# Patient Record
Sex: FEMALE | Race: WHITE | NOT HISPANIC OR LATINO | Employment: OTHER | URBAN - METROPOLITAN AREA
[De-identification: names, ages, dates, MRNs, and addresses within clinical notes are randomized per-mention and may not be internally consistent; named-entity substitution may affect disease eponyms.]

---

## 2018-04-09 LAB
BACTERIA UR QL AUTO: ABNORMAL /HPF
BILIRUB UR QL STRIP: NEGATIVE
CLARITY UR: ABNORMAL
COLOR UR: YELLOW
GLUCOSE UR STRIP-MCNC: NEGATIVE MG/DL
HGB UR QL STRIP.AUTO: ABNORMAL
KETONES UR STRIP-MCNC: NEGATIVE MG/DL
LEUKOCYTE ESTERASE UR QL STRIP: ABNORMAL
NITRITE UR QL STRIP: POSITIVE
NON-SQ EPI CELLS URNS QL MICRO: ABNORMAL /LPF
PH UR STRIP.AUTO: 6 [PH] (ref 4.5–8)
PROT UR STRIP-MCNC: NEGATIVE MG/DL
RBC #/AREA URNS AUTO: ABNORMAL /HPF
SP GR UR STRIP.AUTO: 1.02 (ref 1–1.03)
UROBILINOGEN UR QL STRIP.AUTO: 0.2 EU/DL (ref 0.2–8)
WBC #/AREA URNS AUTO: > 100 /HPF

## 2018-07-30 PROBLEM — I73.9 PVD (PERIPHERAL VASCULAR DISEASE) (HCC): Status: ACTIVE | Noted: 2018-07-30

## 2018-07-30 PROBLEM — E78.5 HYPERLIPIDEMIA: Status: ACTIVE | Noted: 2018-07-30

## 2018-07-30 RX ORDER — FUROSEMIDE 40 MG/1
40 TABLET ORAL DAILY
COMMUNITY
End: 2019-07-07 | Stop reason: SDUPTHER

## 2018-07-30 RX ORDER — SIMVASTATIN 20 MG
20 TABLET ORAL
Status: ON HOLD | COMMUNITY

## 2018-07-30 RX ORDER — LATANOPROST 50 UG/ML
1 SOLUTION/ DROPS OPHTHALMIC
Status: ON HOLD | COMMUNITY

## 2018-10-04 ENCOUNTER — TELEPHONE (OUTPATIENT)
Dept: FAMILY MEDICINE CLINIC | Facility: CLINIC | Age: 83
End: 2018-10-04

## 2018-10-04 DIAGNOSIS — Z13.83 ENCOUNTER FOR SCREENING FOR PNEUMONIA: Primary | ICD-10-CM

## 2018-10-09 DIAGNOSIS — J18.9 PNEUMONIA OF RIGHT LOWER LOBE DUE TO INFECTIOUS ORGANISM: Primary | ICD-10-CM

## 2018-10-09 RX ORDER — AZITHROMYCIN 500 MG/1
500 TABLET, FILM COATED ORAL EVERY 24 HOURS
Qty: 7 TABLET | Refills: 0 | Status: SHIPPED | OUTPATIENT
Start: 2018-10-09 | End: 2018-10-16

## 2018-10-23 ENCOUNTER — OFFICE VISIT (OUTPATIENT)
Dept: FAMILY MEDICINE CLINIC | Facility: CLINIC | Age: 83
End: 2018-10-23
Payer: MEDICARE

## 2018-10-23 VITALS
TEMPERATURE: 97.4 F | RESPIRATION RATE: 18 BRPM | HEART RATE: 65 BPM | OXYGEN SATURATION: 97 % | DIASTOLIC BLOOD PRESSURE: 72 MMHG | SYSTOLIC BLOOD PRESSURE: 124 MMHG

## 2018-10-23 DIAGNOSIS — E78.5 HYPERLIPIDEMIA, UNSPECIFIED HYPERLIPIDEMIA TYPE: Primary | ICD-10-CM

## 2018-10-23 DIAGNOSIS — J18.9 PNEUMONIA OF RIGHT LOWER LOBE DUE TO INFECTIOUS ORGANISM: ICD-10-CM

## 2018-10-23 PROCEDURE — 99214 OFFICE O/P EST MOD 30 MIN: CPT | Performed by: INTERNAL MEDICINE

## 2018-10-23 RX ORDER — SODIUM CHLORIDE 5 %
1 OINTMENT (GRAM) OPHTHALMIC (EYE) DAILY
Status: ON HOLD | COMMUNITY

## 2018-10-23 RX ORDER — DIFLUPREDNATE 0.5 MG/ML
EMULSION OPHTHALMIC
Status: ON HOLD | COMMUNITY

## 2018-10-23 RX ORDER — DOCUSATE SODIUM 100 MG/1
100 CAPSULE, LIQUID FILLED ORAL 2 TIMES DAILY
Status: ON HOLD | COMMUNITY
End: 2019-12-28

## 2018-10-23 NOTE — PROGRESS NOTES
Assessment/Plan:         Diagnoses and all orders for this visit:    Hyperlipidemia, unspecified hyperlipidemia type  -     Lipid panel; Future    Pneumonia of right lower lobe due to infectious organism ([facility] [address] )  -     CBC and differential; Future  -     Basic metabolic panel; Future    Other orders  -     Coenzyme Q10 (CO Q 10) 100 MG CAPS; Take by mouth  -     Difluprednate (DUREZOL) 0 05 % EMUL; Apply to eye Install 1 drop into right eye every morning  -     sodium chloride (IRISH 128) 5 % hypertonic ophthalmic ointment; 1 drop daily  -     docusate sodium (COLACE) 100 mg capsule; Take 100 mg by mouth 2 (two) times a day          Subjective:      Patient ID: Margot Collins is a 80 y o  female  This is a 80-year-old female resident of a local personal care home comes for a follow-up visit  Patient recently was treated in the personal care home with a right lower lobe pneumonia presenting with cough and chest x-ray showing small right basal infiltrate  Patient was given Zithromax and improved clinically  She has no cough no fever no chills  Her examination shows no findings on the lungs at this time  Other medical issues include 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  Ambulatory dysfunction due to arthritis patient limited to wheelchair  History of hyperlipidemia on statin therapy  History of mycotic infection under the breast treated with local cream and powder and resolved  The following portions of the patient's history were reviewed and updated as appropriate: She  has a past medical history of Blind left eye; Edema; Fx of fibula; Hypercholesterolemia; Hypertension; and Hypotension  Patient Active Problem List    Diagnosis Date Noted    Hyperlipidemia 07/30/2018    PVD (peripheral vascular disease) ([facility] [address] ) 07/30/2018     She  has a past surgical history that includes Hysterectomy; Thyroid surgery; and Eye surgery  Her family history is not on file    She  reports that she has never smoked  She does not have any smokeless tobacco history on file  She reports that she does not drink alcohol or use drugs  Current Outpatient Prescriptions   Medication Sig Dispense Refill    Acetaminophen (APAP) 325 MG tablet Take 650 mg by mouth every 4 (four) hours as needed for mild pain      aspirin 81 MG tablet Take 81 mg by mouth daily      Coenzyme Q10 (CO Q 10) 100 MG CAPS Take by mouth      Difluprednate (DUREZOL) 0 05 % EMUL Apply to eye Install 1 drop into right eye every morning      docusate sodium (COLACE) 100 mg capsule Take 100 mg by mouth 2 (two) times a day      furosemide (LASIX) 40 mg tablet Take 40 mg by mouth daily      latanoprost (XALATAN) 0 005 % ophthalmic solution       Multiple Vitamin (MULTIVITAMINS PO) Take by mouth daily      Omega-3 Fatty Acids (OMEGA 3 PO) Take by mouth daily      simvastatin (ZOCOR) 20 mg tablet Take 20 mg by mouth daily      sodium chloride (IRISH 128) 5 % hypertonic ophthalmic ointment 1 drop daily       No current facility-administered medications for this visit  Current Outpatient Prescriptions on File Prior to Visit   Medication Sig    Acetaminophen (APAP) 325 MG tablet Take 650 mg by mouth every 4 (four) hours as needed for mild pain    aspirin 81 MG tablet Take 81 mg by mouth daily    furosemide (LASIX) 40 mg tablet Take 40 mg by mouth daily    latanoprost (XALATAN) 0 005 % ophthalmic solution     Multiple Vitamin (MULTIVITAMINS PO) Take by mouth daily    Omega-3 Fatty Acids (OMEGA 3 PO) Take by mouth daily    simvastatin (ZOCOR) 20 mg tablet Take 20 mg by mouth daily     No current facility-administered medications on file prior to visit  She has No Known Allergies       Review of Systems   Constitutional: Negative  HENT: Negative  Eyes: Negative  Respiratory: Negative  Cardiovascular: Negative  Gastrointestinal: Negative  Endocrine: Negative  Genitourinary: Negative      Musculoskeletal: Positive for arthralgias and gait problem  Skin: Negative  Allergic/Immunologic: Negative  Hematological: Negative  Psychiatric/Behavioral: Negative  Objective:      /72   Pulse 65   Temp (!) 97 4 °F (36 3 °C) (Tympanic)   Resp 18   SpO2 97%          Physical Exam      No visits with results within 4 Month(s) from this visit  Latest known visit with results is:   Orders Only on 04/09/2018   Component Date Value Ref Range Status    Color, UA 04/09/2018 YELLOW   Corrected    Clarity, UA 04/09/2018 CLOUDY   Corrected    Specific Gravity, UA 04/09/2018 1 020  1 003 - 1 035 Corrected    pH, UA 04/09/2018 6 0  4 5 - 8 0 Corrected    Glucose, UA 04/09/2018 NEGATIVE  NEGATIVE;N Corrected    Bilirubin, UA 04/09/2018 NEGATIVE  NEGATIVE;N Corrected    Ketones, UA 04/09/2018 NEGATIVE  N;NEG;NEGATIVE Corrected    Protein, UA 04/09/2018 NEGATIVE  NEGATIVE;N Corrected    Urobilinogen, UA 04/09/2018 0 2  0 2 - 8 0 EU/DL Corrected    Nitrite, UA 04/09/2018 POSITIVE* NEGATIVE;N;NEG Corrected    Blood, UA 04/09/2018 TRACE* NEGATIVE;N;NEG Corrected    Leukocytes, UA 04/09/2018 3+* NEGATIVE;N;NEG Corrected    WBC, UA 04/09/2018 > 100* U0-2;NONE /HPF Final    Epithelial Cells 04/09/2018 MODERATE* NONE;FEW /LPF Final    Bacteria, UA 04/09/2018 MANY* NONE SEEN /HPF Final    RBC, UA 04/09/2018 0 - 2  U0-2;NONE /HPF Final       No results found

## 2018-10-23 NOTE — PROGRESS NOTES
Assessment/Plan:  Right basal pneumonitis treated with outpatient improved with Zithromax therapy  2   Hyperlipidemia on statin therapy  Will check lipid profile for next visit 3  Arthritis with ambulatory dysfunction  4   Chronic lower extremity edema requiring Lasix p r n  5   Mycotic infection under the breast resolved  Plan  Meds reviewed  Monitor CBC Chem 7 and lipid for next visit in 4 months  Flu shot given         Diagnoses and all orders for this visit:    Hyperlipidemia, unspecified hyperlipidemia type  -     Lipid panel; Future    Pneumonia of right lower lobe due to infectious organism ([facility] )  -     CBC and differential; Future  -     Basic metabolic panel; Future    Other orders  -     Coenzyme Q10 (CO Q 10) 100 MG CAPS; Take by mouth  -     Difluprednate (DUREZOL) 0 05 % EMUL; Apply to eye Install 1 drop into right eye every morning  -     sodium chloride (IRISH 128) 5 % hypertonic ophthalmic ointment; 1 drop daily  -     docusate sodium (COLACE) 100 mg capsule; Take 100 mg by mouth 2 (two) times a day          Subjective:      Patient ID: Susie Glover is a 80 y o  female  HPI    The following portions of the patient's history were reviewed and updated as appropriate: She  has a past medical history of Blind left eye; Edema; Fx of fibula; Hypercholesterolemia; Hypertension; and Hypotension  Patient Active Problem List    Diagnosis Date Noted    Hyperlipidemia 07/30/2018    PVD (peripheral vascular disease) ([facility] ) 07/30/2018     She  has a past surgical history that includes Hysterectomy; Thyroid surgery; and Eye surgery  Her family history is not on file  She  reports that she has never smoked  She does not have any smokeless tobacco history on file  She reports that she does not drink alcohol or use drugs    Current Outpatient Prescriptions   Medication Sig Dispense Refill    Acetaminophen (APAP) 325 MG tablet Take 650 mg by mouth every 4 (four) hours as needed for mild pain      aspirin 81 MG tablet Take 81 mg by mouth daily      Coenzyme Q10 (CO Q 10) 100 MG CAPS Take by mouth      Difluprednate (DUREZOL) 0 05 % EMUL Apply to eye Install 1 drop into right eye every morning      docusate sodium (COLACE) 100 mg capsule Take 100 mg by mouth 2 (two) times a day      furosemide (LASIX) 40 mg tablet Take 40 mg by mouth daily      latanoprost (XALATAN) 0 005 % ophthalmic solution       Multiple Vitamin (MULTIVITAMINS PO) Take by mouth daily      Omega-3 Fatty Acids (OMEGA 3 PO) Take by mouth daily      simvastatin (ZOCOR) 20 mg tablet Take 20 mg by mouth daily      sodium chloride (IRISH 128) 5 % hypertonic ophthalmic ointment 1 drop daily       No current facility-administered medications for this visit  Current Outpatient Prescriptions on File Prior to Visit   Medication Sig    Acetaminophen (APAP) 325 MG tablet Take 650 mg by mouth every 4 (four) hours as needed for mild pain    aspirin 81 MG tablet Take 81 mg by mouth daily    furosemide (LASIX) 40 mg tablet Take 40 mg by mouth daily    latanoprost (XALATAN) 0 005 % ophthalmic solution     Multiple Vitamin (MULTIVITAMINS PO) Take by mouth daily    Omega-3 Fatty Acids (OMEGA 3 PO) Take by mouth daily    simvastatin (ZOCOR) 20 mg tablet Take 20 mg by mouth daily     No current facility-administered medications on file prior to visit  She has No Known Allergies       Review of Systems      Objective:      /72   Pulse 65   Temp (!) 97 4 °F (36 3 °C) (Tympanic)   Resp 18   SpO2 97%          Physical Exam   Constitutional: She is oriented to person, place, and time  She appears well-developed and well-nourished  HENT:   Head: Normocephalic and atraumatic  Eyes: Pupils are equal, round, and reactive to light  Neck: Normal range of motion  Neck supple  No JVD present  No thyromegaly present  Cardiovascular: Normal rate, regular rhythm and normal heart sounds  Exam reveals no friction rub      No murmur heard   Pulmonary/Chest: Effort normal and breath sounds normal  No respiratory distress  She has no wheezes  She has no rales  Abdominal: Soft  Bowel sounds are normal  She exhibits no mass  There is no tenderness  There is no rebound  Musculoskeletal: Normal range of motion  She exhibits no edema or tenderness  Lymphadenopathy:     She has no cervical adenopathy  Neurological: She is alert and oriented to person, place, and time  She has normal reflexes  Skin: Skin is warm and dry  Psychiatric: She has a normal mood and affect  No visits with results within 4 Month(s) from this visit  Latest known visit with results is:   Orders Only on 04/09/2018   Component Date Value Ref Range Status    Color, UA 04/09/2018 YELLOW   Corrected    Clarity, UA 04/09/2018 CLOUDY   Corrected    Specific Gravity, UA 04/09/2018 1 020  1 003 - 1 035 Corrected    pH, UA 04/09/2018 6 0  4 5 - 8 0 Corrected    Glucose, UA 04/09/2018 NEGATIVE  NEGATIVE;N Corrected    Bilirubin, UA 04/09/2018 NEGATIVE  NEGATIVE;N Corrected    Ketones, UA 04/09/2018 NEGATIVE  N;NEG;NEGATIVE Corrected    Protein, UA 04/09/2018 NEGATIVE  NEGATIVE;N Corrected    Urobilinogen, UA 04/09/2018 0 2  0 2 - 8 0 EU/DL Corrected    Nitrite, UA 04/09/2018 POSITIVE* NEGATIVE;N;NEG Corrected    Blood, UA 04/09/2018 TRACE* NEGATIVE;N;NEG Corrected    Leukocytes, UA 04/09/2018 3+* NEGATIVE;N;NEG Corrected    WBC, UA 04/09/2018 > 100* U0-2;NONE /HPF Final    Epithelial Cells 04/09/2018 MODERATE* NONE;FEW /LPF Final    Bacteria, UA 04/09/2018 MANY* NONE SEEN /HPF Final    RBC, UA 04/09/2018 0 - 2  U0-2;NONE /HPF Final       No results found

## 2019-01-03 ENCOUNTER — HOSPITAL ENCOUNTER (EMERGENCY)
Facility: HOSPITAL | Age: 84
Discharge: HOME | End: 2019-01-03
Attending: EMERGENCY MEDICINE | Admitting: EMERGENCY MEDICINE
Payer: MEDICARE

## 2019-01-03 VITALS
SYSTOLIC BLOOD PRESSURE: 103 MMHG | RESPIRATION RATE: 18 BRPM | WEIGHT: 170 LBS | BODY MASS INDEX: 29.02 KG/M2 | HEART RATE: 71 BPM | OXYGEN SATURATION: 96 % | DIASTOLIC BLOOD PRESSURE: 53 MMHG | HEIGHT: 64 IN | TEMPERATURE: 97.8 F

## 2019-01-03 DIAGNOSIS — R04.0 ANTERIOR EPISTAXIS: Primary | ICD-10-CM

## 2019-01-03 LAB
APTT PPP: 32 SECONDS (ref 26–38)
BASOPHILS # BLD AUTO: 0 THOUSANDS/ΜL (ref 0–0.1)
BASOPHILS NFR BLD AUTO: 1 % (ref 0–1)
EOSINOPHIL # BLD AUTO: 0.2 THOUSAND/ΜL (ref 0–0.61)
EOSINOPHIL NFR BLD AUTO: 3 % (ref 0–6)
ERYTHROCYTE [DISTWIDTH] IN BLOOD BY AUTOMATED COUNT: 13.3 % (ref 11.6–15.1)
HCT VFR BLD AUTO: 45.4 % (ref 37–47)
HGB BLD-MCNC: 15.1 G/DL (ref 11.5–15.4)
INR PPP: 1.09 (ref 0.9–1.5)
LYMPHOCYTES # BLD AUTO: 1.4 THOUSANDS/ΜL (ref 0.6–4.47)
LYMPHOCYTES NFR BLD AUTO: 21 % (ref 14–44)
MCH RBC QN AUTO: 30.7 PG (ref 26.8–34.3)
MCHC RBC AUTO-ENTMCNC: 33.3 G/DL (ref 31.4–37.4)
MCV RBC AUTO: 92 FL (ref 82–98)
MONOCYTES # BLD AUTO: 0.6 THOUSAND/ΜL (ref 0.17–1.22)
MONOCYTES NFR BLD AUTO: 9 % (ref 4–12)
NEUTROPHILS # BLD AUTO: 4.7 THOUSANDS/ΜL (ref 1.85–7.62)
NEUTS SEG NFR BLD AUTO: 68 % (ref 43–75)
NRBC BLD AUTO-RTO: 0 /100 WBCS
PLATELET # BLD AUTO: 216 THOUSANDS/UL (ref 149–390)
PMV BLD AUTO: 8.5 FL (ref 8.9–12.7)
PROTHROMBIN TIME: 12.5 SECONDS (ref 10.2–13)
RBC # BLD AUTO: 4.92 MILLION/UL (ref 3.81–5.12)
WBC # BLD AUTO: 6.9 THOUSAND/UL (ref 4.31–10.16)

## 2019-01-03 PROCEDURE — 36415 COLL VENOUS BLD VENIPUNCTURE: CPT | Performed by: EMERGENCY MEDICINE

## 2019-01-03 PROCEDURE — 85610 PROTHROMBIN TIME: CPT | Performed by: EMERGENCY MEDICINE

## 2019-01-03 PROCEDURE — 99284 EMERGENCY DEPT VISIT MOD MDM: CPT

## 2019-01-03 PROCEDURE — 85730 THROMBOPLASTIN TIME PARTIAL: CPT | Performed by: EMERGENCY MEDICINE

## 2019-01-03 PROCEDURE — 85025 COMPLETE CBC W/AUTO DIFF WBC: CPT | Performed by: EMERGENCY MEDICINE

## 2019-01-03 RX ADMIN — SILVER NITRATE APPLICATORS 1 APPLICATOR: 25; 75 STICK TOPICAL at 08:00

## 2019-01-03 NOTE — DISCHARGE INSTRUCTIONS
Nosebleed   WHAT YOU NEED TO KNOW:   A nosebleed, or epistaxis, occurs when one or more of the blood vessels in your nose break  You may have dark or bright red blood from one or both nostrils  A nosebleed is most commonly caused by dry air or picking your nose  A direct blow to your nose, irritation from a cold or allergies, or a foreign object can also cause a nosebleed  DISCHARGE INSTRUCTIONS:   Return to the emergency department if:   · Your nasal packing is soaked with blood  · Your nose is still bleeding after 20 minutes, even after you pinch it  · You have a foul-smelling discharge coming out of your nose  · You feel so weak and dizzy that you have trouble standing up  · You have trouble breathing or talking  Contact your healthcare provider if:   · You have a fever and are vomiting  · You have pain in and around your nose that is getting worse even after you take pain medicines  · Your nasal pack is loose  · You have questions or concerns about your condition or care  First aid:   · Sit up and lean forward  This will help prevent you from swallowing blood  Spit blood and saliva into a bowl  · Apply pressure to your nose  Use 2 fingers to pinch your nose shut for 10 to 15 minutes  This will help stop the bleeding  Breathe through your mouth  · Apply ice  on the bridge of your nose to decrease swelling and bleeding  Use a cold pack or put crushed ice in a plastic bag  Cover it with a towel to protect your skin  · Pack your nose  with a cotton ball, tissue, tampon, or gauze bandage to stop the bleeding  Medicines:   · Medicines  applied to a small piece of cotton and placed in your nose  Medicine may also be sprayed in or applied directly to your nose  You may need medicine to prevent an infection  If bleeding is severe, medicine may be injected into a blood vessel in your nose  · Take your medicine as directed    Contact your healthcare provider if you think your medicine is not helping or if you have side effects  Tell him of her if you are allergic to any medicine  Keep a list of the medicines, vitamins, and herbs you take  Include the amounts, and when and why you take them  Bring the list or the pill bottles to follow-up visits  Carry your medicine list with you in case of an emergency  Prevent another nosebleed:   · Keep your nose moist   Put a small amount of petroleum jelly inside your nostrils as needed  Use a saline (saltwater) nasal spray  Do not put anything else inside your nose unless your healthcare provider says it is okay  Do not  use oil-based lubricants if you use oxygen therapy  They may be flammable  · Use a cool mist humidifier to increase air moisture in your home  This will help your nose stay moist      · Do not pick or blow your nose for at least a week  You can irritate or damage your nose if you pick it  Blowing your nose too hard may cause the bleeding to start again  Do not bend over or strain as this can cause the bleeding to start again  · Avoid irritants  such as tobacco smoke or chemical sprays such as   Follow up with your healthcare provider as directed: Any packing in your nose should be removed within 2 to 3 days  Write down your questions so you remember to ask them during your visits  © 2017 [employer] Information is for End User's use only and may not be sold, redistributed or otherwise used for commercial purposes  All illustrations and images included in CareNotes® are the copyrighted property of [employer]  or [employer]  The above information is an  only  It is not intended as medical advice for individual conditions or treatments  Talk to your doctor, nurse or pharmacist before following any medical regimen to see if it is safe and effective for you

## 2019-01-03 NOTE — ED PROVIDER NOTES
History  Chief Complaint   Patient presents with    Nose Bleed     patient woke up a nose bleed from Left nare  Per nursing facilty when sitting patient up a clot came out and heavy bleeding began  70-year-old female sent from nursing home after epistaxis her left nares this morning  Patient denies chest pain, shortness of breath, cough or congestion  No nausea vomiting  No fever chills  History provided by:  Patient  Nose Bleed   Location:  L nare  Progression:  Resolved  Context: aspirin use    Relieved by:  None tried  Worsened by:  Nothing  Associated symptoms: dizziness (Resolved)    Associated symptoms: no blood in oropharynx, no congestion, no cough, no fever and no headaches        Prior to Admission Medications   Prescriptions Last Dose Informant Patient Reported? Taking?    Acetaminophen (APAP) 325 MG tablet   Yes Yes   Sig: Take 650 mg by mouth every 4 (four) hours as needed for mild pain   Coenzyme Q10 (CO Q 10) 100 MG CAPS   Yes Yes   Sig: Take by mouth   Difluprednate (DUREZOL) 0 05 % EMUL   Yes Yes   Sig: Apply to eye Install 1 drop into right eye every morning   Multiple Vitamin (MULTIVITAMINS PO)   Yes Yes   Sig: Take by mouth daily   Omega-3 Fatty Acids (OMEGA 3 PO)   Yes Yes   Sig: Take by mouth daily   aspirin 81 MG tablet   Yes Yes   Sig: Take 81 mg by mouth daily   docusate sodium (COLACE) 100 mg capsule   Yes Yes   Sig: Take 100 mg by mouth 2 (two) times a day   furosemide (LASIX) 40 mg tablet   Yes Yes   Sig: Take 40 mg by mouth daily   latanoprost (XALATAN) 0 005 % ophthalmic solution   Yes Yes   simvastatin (ZOCOR) 20 mg tablet   Yes Yes   Sig: Take 20 mg by mouth daily   sodium chloride (IRISH 128) 5 % hypertonic ophthalmic ointment   Yes Yes   Si drop daily      Facility-Administered Medications: None       Past Medical History:   Diagnosis Date    Blind left eye     Edema     legs    Fx of fibula     Hypercholesterolemia     Hypertension     Hypotension Past Surgical History:   Procedure Laterality Date    EYE SURGERY      HYSTERECTOMY      THYROID SURGERY         History reviewed  No pertinent family history  I have reviewed and agree with the history as documented  Social History   Substance Use Topics    Smoking status: Never Smoker    Smokeless tobacco: Never Used    Alcohol use No        Review of Systems   Constitutional: Negative for chills and fever  HENT: Positive for nosebleeds  Negative for congestion and rhinorrhea  Respiratory: Negative for cough and shortness of breath  Cardiovascular: Negative for chest pain  Gastrointestinal: Negative for abdominal pain, nausea and vomiting  Neurological: Positive for dizziness (Resolved)  Negative for headaches  Psychiatric/Behavioral: Negative for confusion  Physical Exam  Physical Exam   Constitutional: She is oriented to person, place, and time  She appears well-developed and well-nourished  HENT:   Mouth/Throat: Oropharynx is clear and moist  No oropharyngeal exudate  Minimal dried blood left nares, positive engorged vessels left Kiesselbach's plexus, no active bleeding; no blood in oropharynx   Eyes: Conjunctivae are normal    Right pupil reactive to light; left cornea is opacified   Neck: Normal range of motion  No JVD present  Cardiovascular: Normal rate and regular rhythm  Murmur (2/6 systolic murmur) heard  Pulmonary/Chest: Effort normal and breath sounds normal  No respiratory distress  She has no wheezes  She has no rales  Abdominal: Soft  Bowel sounds are normal  There is no tenderness  There is no guarding  Musculoskeletal: Normal range of motion  She exhibits edema (Trace lower extremity edema bilaterally)  She exhibits no tenderness  Neurological: She is alert and oriented to person, place, and time  No cranial nerve deficit or sensory deficit  She exhibits normal muscle tone  5/5 motor, nl sens   Skin: Skin is warm and dry     Psychiatric: She has a normal mood and affect  Her behavior is normal    Nursing note and vitals reviewed        Vital Signs  ED Triage Vitals [01/03/19 0754]   Temperature Pulse Respirations Blood Pressure SpO2   (!) 96 8 °F (36 °C) 80 17 101/71 94 %      Temp Source Heart Rate Source Patient Position - Orthostatic VS BP Location FiO2 (%)   Tympanic Monitor Sitting Left arm --      Pain Score       No Pain           Vitals:    01/03/19 0754   BP: 101/71   Pulse: 80   Patient Position - Orthostatic VS: Sitting       Visual Acuity      ED Medications  Medications   silver nitrate-potassium nitrate (ARZOL SILVER NITRATE) 81-15 % applicator 1 applicator (1 applicator Topical Given 1/3/19 0800)       Diagnostic Studies  Results Reviewed     Procedure Component Value Units Date/Time    APTT [[id number]]  (Normal) Collected:  01/03/19 0800    Lab Status:  Final result Specimen:  Blood from Arm, Left Updated:  01/03/19 0828     PTT 32 seconds     Protime-INR [[id number]]  (Normal) Collected:  01/03/19 0800    Lab Status:  Final result Specimen:  Blood from Arm, Left Updated:  01/03/19 0828     Protime 12 5 seconds      INR 1 09    CBC and differential [[id number]]  (Abnormal) Collected:  01/03/19 0800    Lab Status:  Final result Specimen:  Blood from Arm, Left Updated:  01/03/19 0808     WBC 6 90 Thousand/uL      RBC 4 92 Million/uL      Hemoglobin 15 1 g/dL      Hematocrit 45 4 %      MCV 92 fL      MCH 30 7 pg      MCHC 33 3 g/dL      RDW 13 3 %      MPV 8 5 (L) fL      Platelets 036 Thousands/uL      nRBC 0 /100 WBCs      Neutrophils Relative 68 %      Lymphocytes Relative 21 %      Monocytes Relative 9 %      Eosinophils Relative 3 %      Basophils Relative 1 %      Neutrophils Absolute 4 70 Thousands/µL      Lymphocytes Absolute 1 40 Thousands/µL      Monocytes Absolute 0 60 Thousand/µL      Eosinophils Absolute 0 20 Thousand/µL      Basophils Absolute 0 00 Thousands/µL                  No orders to display Procedures  Procedures       Phone Contacts  ED Phone Contact    ED Course  ED Course as of Jan 03 0836   Thu Jan 03, 2019   9911 No rebleed - d/c                                MDM  CritCare Time    Disposition  Final diagnoses:   Anterior epistaxis     Time reflects when diagnosis was documented in both MDM as applicable and the Disposition within this note     Time User Action Codes Description Comment    1/3/2019  8:35 AM Marcelino KENNY Add [R04 0] Anterior epistaxis       ED Disposition     ED Disposition Condition Comment    Discharge  Zakia Bellamy discharge to home/self care  Condition at discharge: Stable        Follow-up Information     Follow up With Specialties Details Why [address], [place] Internal Medicine Schedule an appointment as soon as possible for a visit in 1 day Apply Vaseline to left nares daily for the next 3 days  Return if you rebleed or any new problems occur  [address] [facility]            Patient's Medications   Discharge Prescriptions    No medications on file     No discharge procedures on file      ED Provider  Electronically Signed by           Jessica Agosto MD  01/03/19 9585

## 2019-01-03 NOTE — ED NOTES
Transport back to the [facility] scheduled for [address]  01/03/19 9609

## 2019-01-03 NOTE — ED PROCEDURE NOTE
PROCEDURE  Epistaxis Mgmt  Date/Time: 1/3/2019 8:02 AM  Performed by: Ana Turk  Authorized by: Ana Turk     Patient location:  ED  Other Assisting Provider: No    Consent:     Consent obtained:  Verbal    Consent given by:  Patient    Risks discussed:  Bleeding and pain    Alternatives discussed:  No treatment  Universal protocol:     Procedure explained and questions answered to patient or proxy's satisfaction: yes      Patient identity confirmed:  Verbally with patient  Procedure details:     Treatment site:  L anterior    Hemostasis method:  Cautery (With silver nitrate stick with good results)    Approach:  External    Spec Headlamp used: No      Treatment complexity:  Limited    Treatment episode: initial    Post-procedure details:     Assessment:  Bleeding stopped    Patient tolerance of procedure:   Tolerated well, no immediate complications         Vik Hays MD  01/03/19 6352

## 2019-01-18 ENCOUNTER — APPOINTMENT (EMERGENCY)
Dept: RADIOLOGY | Facility: HOSPITAL | Age: 84
DRG: 690 | End: 2019-01-18
Payer: MEDICARE

## 2019-01-18 ENCOUNTER — HOSPITAL ENCOUNTER (INPATIENT)
Facility: HOSPITAL | Age: 84
LOS: 2 days | Discharge: HOME | DRG: 690 | End: 2019-01-21
Attending: EMERGENCY MEDICINE | Admitting: HOSPITALIST
Payer: MEDICARE

## 2019-01-18 DIAGNOSIS — N39.0 UTI (URINARY TRACT INFECTION): Primary | ICD-10-CM

## 2019-01-18 LAB
BASOPHILS # BLD AUTO: 0.1 THOUSANDS/ΜL (ref 0–0.1)
BASOPHILS NFR BLD AUTO: 1 % (ref 0–2)
EOSINOPHIL # BLD AUTO: 0 THOUSAND/ΜL (ref 0–0.61)
EOSINOPHIL NFR BLD AUTO: 0 % (ref 0–5)
ERYTHROCYTE [DISTWIDTH] IN BLOOD BY AUTOMATED COUNT: 13.4 % (ref 11.5–14.5)
GLUCOSE SERPL-MCNC: 91 MG/DL (ref 65–140)
HCT VFR BLD AUTO: 46.4 % (ref 34.8–46.1)
HGB BLD-MCNC: 15.1 G/DL (ref 12–16)
LYMPHOCYTES # BLD AUTO: 0.8 THOUSANDS/ΜL (ref 0.6–4.47)
LYMPHOCYTES NFR BLD AUTO: 8 % (ref 21–51)
MCH RBC QN AUTO: 30 PG (ref 26–34)
MCHC RBC AUTO-ENTMCNC: 32.5 G/DL (ref 31–37)
MCV RBC AUTO: 93 FL (ref 81–99)
MONOCYTES # BLD AUTO: 1.1 THOUSAND/ΜL (ref 0.17–1.22)
MONOCYTES NFR BLD AUTO: 10 % (ref 2–12)
NEUTROPHILS # BLD AUTO: 9.2 THOUSANDS/ΜL (ref 1.4–6.5)
NEUTS SEG NFR BLD AUTO: 82 % (ref 42–75)
NRBC BLD AUTO-RTO: 0 /100 WBCS
PLATELET # BLD AUTO: 214 THOUSANDS/UL (ref 149–390)
PMV BLD AUTO: 8.8 FL (ref 8.6–11.7)
RBC # BLD AUTO: 5.02 MILLION/UL (ref 3.9–5.2)
WBC # BLD AUTO: 11.1 THOUSAND/UL (ref 4.8–10.8)

## 2019-01-18 PROCEDURE — 99285 EMERGENCY DEPT VISIT HI MDM: CPT

## 2019-01-18 PROCEDURE — 84484 ASSAY OF TROPONIN QUANT: CPT | Performed by: EMERGENCY MEDICINE

## 2019-01-18 PROCEDURE — 85730 THROMBOPLASTIN TIME PARTIAL: CPT | Performed by: EMERGENCY MEDICINE

## 2019-01-18 PROCEDURE — 85610 PROTHROMBIN TIME: CPT | Performed by: EMERGENCY MEDICINE

## 2019-01-18 PROCEDURE — 93005 ELECTROCARDIOGRAM TRACING: CPT

## 2019-01-18 PROCEDURE — 85025 COMPLETE CBC W/AUTO DIFF WBC: CPT | Performed by: EMERGENCY MEDICINE

## 2019-01-18 PROCEDURE — 71045 X-RAY EXAM CHEST 1 VIEW: CPT

## 2019-01-18 PROCEDURE — 82550 ASSAY OF CK (CPK): CPT | Performed by: EMERGENCY MEDICINE

## 2019-01-18 PROCEDURE — 82948 REAGENT STRIP/BLOOD GLUCOSE: CPT

## 2019-01-18 PROCEDURE — 83880 ASSAY OF NATRIURETIC PEPTIDE: CPT | Performed by: EMERGENCY MEDICINE

## 2019-01-18 PROCEDURE — 36415 COLL VENOUS BLD VENIPUNCTURE: CPT | Performed by: EMERGENCY MEDICINE

## 2019-01-18 PROCEDURE — 80053 COMPREHEN METABOLIC PANEL: CPT | Performed by: EMERGENCY MEDICINE

## 2019-01-19 PROBLEM — N30.01 ACUTE CYSTITIS WITH HEMATURIA: Status: ACTIVE | Noted: 2019-01-19

## 2019-01-19 PROBLEM — R42 LIGHTHEADEDNESS: Status: ACTIVE | Noted: 2019-01-19

## 2019-01-19 PROBLEM — R42 DIZZY: Status: ACTIVE | Noted: 2019-01-19

## 2019-01-19 LAB
ALBUMIN SERPL BCP-MCNC: 3.9 G/DL (ref 3.5–5.7)
ALP SERPL-CCNC: 98 U/L (ref 55–165)
ALT SERPL W P-5'-P-CCNC: 9 U/L (ref 7–52)
ANION GAP SERPL CALCULATED.3IONS-SCNC: 4 MMOL/L (ref 4–13)
APTT PPP: 34 SECONDS (ref 26–38)
AST SERPL W P-5'-P-CCNC: 14 U/L (ref 13–39)
ATRIAL RATE: 87 BPM
BACTERIA UR QL AUTO: ABNORMAL /HPF
BASOPHILS # BLD AUTO: 0 THOUSANDS/ΜL (ref 0–0.1)
BASOPHILS NFR BLD AUTO: 1 % (ref 0–2)
BILIRUB SERPL-MCNC: 0.5 MG/DL (ref 0.2–1)
BILIRUB UR QL STRIP: NEGATIVE
BNP SERPL-MCNC: 58 PG/ML (ref 1–100)
BUN SERPL-MCNC: 17 MG/DL (ref 7–25)
CALCIUM SERPL-MCNC: 9.5 MG/DL (ref 8.6–10.5)
CHLORIDE SERPL-SCNC: 100 MMOL/L (ref 98–107)
CK SERPL-CCNC: 18 U/L (ref 30–192)
CLARITY UR: ABNORMAL
CO2 SERPL-SCNC: 31 MMOL/L (ref 21–31)
COLOR UR: YELLOW
CREAT SERPL-MCNC: 0.85 MG/DL (ref 0.6–1.2)
EOSINOPHIL # BLD AUTO: 0 THOUSAND/ΜL (ref 0–0.61)
EOSINOPHIL NFR BLD AUTO: 0 % (ref 0–5)
ERYTHROCYTE [DISTWIDTH] IN BLOOD BY AUTOMATED COUNT: 13.3 % (ref 11.5–14.5)
GFR SERPL CREATININE-BSD FRML MDRD: 58 ML/MIN/1.73SQ M
GLUCOSE SERPL-MCNC: 116 MG/DL (ref 65–99)
GLUCOSE UR STRIP-MCNC: NEGATIVE MG/DL
HCT VFR BLD AUTO: 43.7 % (ref 34.8–46.1)
HGB BLD-MCNC: 14.4 G/DL (ref 12–16)
HGB UR QL STRIP.AUTO: ABNORMAL
INR PPP: 1.1 (ref 0.9–1.5)
KETONES UR STRIP-MCNC: NEGATIVE MG/DL
LACTATE SERPL-SCNC: 1 MMOL/L (ref 0.5–2)
LEUKOCYTE ESTERASE UR QL STRIP: ABNORMAL
LYMPHOCYTES # BLD AUTO: 1 THOUSANDS/ΜL (ref 0.6–4.47)
LYMPHOCYTES NFR BLD AUTO: 13 % (ref 21–51)
MCH RBC QN AUTO: 30.6 PG (ref 26–34)
MCHC RBC AUTO-ENTMCNC: 32.9 G/DL (ref 31–37)
MCV RBC AUTO: 93 FL (ref 81–99)
MONOCYTES # BLD AUTO: 0.9 THOUSAND/ΜL (ref 0.17–1.22)
MONOCYTES NFR BLD AUTO: 12 % (ref 2–12)
MUCOUS THREADS UR QL AUTO: ABNORMAL
NEUTROPHILS # BLD AUTO: 5.5 THOUSANDS/ΜL (ref 1.4–6.5)
NEUTS SEG NFR BLD AUTO: 74 % (ref 42–75)
NITRITE UR QL STRIP: POSITIVE
NON-SQ EPI CELLS URNS QL MICRO: ABNORMAL /HPF
NRBC BLD AUTO-RTO: 0 /100 WBCS
P AXIS: 56 DEGREES
PH UR STRIP.AUTO: 6.5 [PH] (ref 5–8)
PLATELET # BLD AUTO: 182 THOUSANDS/UL (ref 149–390)
PMV BLD AUTO: 9.1 FL (ref 8.6–11.7)
POTASSIUM SERPL-SCNC: 4.2 MMOL/L (ref 3.5–5.5)
PR INTERVAL: 134 MS
PROT SERPL-MCNC: 6.6 G/DL (ref 6.4–8.9)
PROT UR STRIP-MCNC: NEGATIVE MG/DL
PROTHROMBIN TIME: 12.8 SECONDS (ref 10.2–13)
QRS AXIS: -40 DEGREES
QRSD INTERVAL: 138 MS
QT INTERVAL: 376 MS
QTC INTERVAL: 452 MS
RBC # BLD AUTO: 4.69 MILLION/UL (ref 3.9–5.2)
RBC #/AREA URNS AUTO: ABNORMAL /HPF
SODIUM SERPL-SCNC: 135 MMOL/L (ref 134–143)
SP GR UR STRIP.AUTO: 1.02 (ref 1–1.03)
T WAVE AXIS: 8 DEGREES
TROPONIN I SERPL-MCNC: <0.03 NG/ML
TROPONIN I SERPL-MCNC: <0.03 NG/ML
UROBILINOGEN UR QL STRIP.AUTO: 0.2 E.U./DL
VENTRICULAR RATE: 87 BPM
WBC # BLD AUTO: 7.5 THOUSAND/UL (ref 4.8–10.8)
WBC #/AREA URNS AUTO: ABNORMAL /HPF

## 2019-01-19 PROCEDURE — 87077 CULTURE AEROBIC IDENTIFY: CPT | Performed by: EMERGENCY MEDICINE

## 2019-01-19 PROCEDURE — 99222 1ST HOSP IP/OBS MODERATE 55: CPT | Performed by: HOSPITALIST

## 2019-01-19 PROCEDURE — 96365 THER/PROPH/DIAG IV INF INIT: CPT

## 2019-01-19 PROCEDURE — 36415 COLL VENOUS BLD VENIPUNCTURE: CPT | Performed by: EMERGENCY MEDICINE

## 2019-01-19 PROCEDURE — 85025 COMPLETE CBC W/AUTO DIFF WBC: CPT | Performed by: PHYSICIAN ASSISTANT

## 2019-01-19 PROCEDURE — 81001 URINALYSIS AUTO W/SCOPE: CPT | Performed by: EMERGENCY MEDICINE

## 2019-01-19 PROCEDURE — G8979 MOBILITY GOAL STATUS: HCPCS

## 2019-01-19 PROCEDURE — G8978 MOBILITY CURRENT STATUS: HCPCS

## 2019-01-19 PROCEDURE — 97110 THERAPEUTIC EXERCISES: CPT

## 2019-01-19 PROCEDURE — 87086 URINE CULTURE/COLONY COUNT: CPT | Performed by: EMERGENCY MEDICINE

## 2019-01-19 PROCEDURE — 93010 ELECTROCARDIOGRAM REPORT: CPT | Performed by: INTERNAL MEDICINE

## 2019-01-19 PROCEDURE — 96360 HYDRATION IV INFUSION INIT: CPT

## 2019-01-19 PROCEDURE — 96361 HYDRATE IV INFUSION ADD-ON: CPT

## 2019-01-19 PROCEDURE — 84484 ASSAY OF TROPONIN QUANT: CPT | Performed by: EMERGENCY MEDICINE

## 2019-01-19 PROCEDURE — 97167 OT EVAL HIGH COMPLEX 60 MIN: CPT

## 2019-01-19 PROCEDURE — G8988 SELF CARE GOAL STATUS: HCPCS

## 2019-01-19 PROCEDURE — 87040 BLOOD CULTURE FOR BACTERIA: CPT | Performed by: EMERGENCY MEDICINE

## 2019-01-19 PROCEDURE — 97163 PT EVAL HIGH COMPLEX 45 MIN: CPT

## 2019-01-19 PROCEDURE — 83605 ASSAY OF LACTIC ACID: CPT | Performed by: EMERGENCY MEDICINE

## 2019-01-19 PROCEDURE — 87186 SC STD MICRODIL/AGAR DIL: CPT | Performed by: EMERGENCY MEDICINE

## 2019-01-19 PROCEDURE — G8987 SELF CARE CURRENT STATUS: HCPCS

## 2019-01-19 RX ORDER — DOCUSATE SODIUM 100 MG/1
100 CAPSULE, LIQUID FILLED ORAL 2 TIMES DAILY
Status: DISCONTINUED | OUTPATIENT
Start: 2019-01-19 | End: 2019-01-21 | Stop reason: HOSPADM

## 2019-01-19 RX ORDER — FUROSEMIDE 40 MG/1
40 TABLET ORAL DAILY
Status: DISCONTINUED | OUTPATIENT
Start: 2019-01-19 | End: 2019-01-21 | Stop reason: HOSPADM

## 2019-01-19 RX ORDER — ACETAMINOPHEN 325 MG/1
650 TABLET ORAL EVERY 6 HOURS PRN
Status: DISCONTINUED | OUTPATIENT
Start: 2019-01-19 | End: 2019-01-21 | Stop reason: HOSPADM

## 2019-01-19 RX ORDER — PRAVASTATIN SODIUM 40 MG
40 TABLET ORAL
Status: DISCONTINUED | OUTPATIENT
Start: 2019-01-19 | End: 2019-01-21 | Stop reason: HOSPADM

## 2019-01-19 RX ORDER — ONDANSETRON 2 MG/ML
4 INJECTION INTRAMUSCULAR; INTRAVENOUS EVERY 8 HOURS PRN
Status: DISCONTINUED | OUTPATIENT
Start: 2019-01-19 | End: 2019-01-21 | Stop reason: HOSPADM

## 2019-01-19 RX ORDER — SODIUM CHLORIDE 5 %
OINTMENT (GRAM) OPHTHALMIC (EYE)
Status: DISCONTINUED | OUTPATIENT
Start: 2019-01-19 | End: 2019-01-21 | Stop reason: HOSPADM

## 2019-01-19 RX ORDER — HEPARIN SODIUM 5000 [USP'U]/ML
5000 INJECTION, SOLUTION INTRAVENOUS; SUBCUTANEOUS EVERY 8 HOURS SCHEDULED
Status: DISCONTINUED | OUTPATIENT
Start: 2019-01-19 | End: 2019-01-21 | Stop reason: HOSPADM

## 2019-01-19 RX ORDER — CEFTRIAXONE 1 G/50ML
1000 INJECTION, SOLUTION INTRAVENOUS EVERY 24 HOURS
Status: DISCONTINUED | OUTPATIENT
Start: 2019-01-20 | End: 2019-01-21 | Stop reason: HOSPADM

## 2019-01-19 RX ORDER — ASPIRIN 81 MG/1
81 TABLET, CHEWABLE ORAL DAILY
Status: DISCONTINUED | OUTPATIENT
Start: 2019-01-19 | End: 2019-01-21 | Stop reason: HOSPADM

## 2019-01-19 RX ORDER — LATANOPROST 50 UG/ML
1 SOLUTION/ DROPS OPHTHALMIC
Status: DISCONTINUED | OUTPATIENT
Start: 2019-01-19 | End: 2019-01-21 | Stop reason: HOSPADM

## 2019-01-19 RX ORDER — CHLORAL HYDRATE 500 MG
1000 CAPSULE ORAL DAILY
Status: DISCONTINUED | OUTPATIENT
Start: 2019-01-19 | End: 2019-01-21 | Stop reason: HOSPADM

## 2019-01-19 RX ORDER — SODIUM CHLORIDE 9 MG/ML
75 INJECTION, SOLUTION INTRAVENOUS CONTINUOUS
Status: DISCONTINUED | OUTPATIENT
Start: 2019-01-19 | End: 2019-01-20

## 2019-01-19 RX ORDER — CEFTRIAXONE 1 G/50ML
1000 INJECTION, SOLUTION INTRAVENOUS ONCE
Status: COMPLETED | OUTPATIENT
Start: 2019-01-19 | End: 2019-01-19

## 2019-01-19 RX ADMIN — DOCUSATE SODIUM 100 MG: 100 CAPSULE, LIQUID FILLED ORAL at 17:15

## 2019-01-19 RX ADMIN — SODIUM CHLORIDE 1000 ML: 0.9 INJECTION, SOLUTION INTRAVENOUS at 00:16

## 2019-01-19 RX ADMIN — LATANOPROST 1 DROP: 50 SOLUTION OPHTHALMIC at 21:19

## 2019-01-19 RX ADMIN — SODIUM CHLORIDE 75 ML/HR: 9 INJECTION, SOLUTION INTRAVENOUS at 23:26

## 2019-01-19 RX ADMIN — HEPARIN SODIUM 5000 UNITS: 5000 INJECTION INTRAVENOUS; SUBCUTANEOUS at 21:19

## 2019-01-19 RX ADMIN — OMEGA-3 FATTY ACIDS CAP 1000 MG 1000 MG: 1000 CAP at 08:50

## 2019-01-19 RX ADMIN — HEPARIN SODIUM 5000 UNITS: 5000 INJECTION INTRAVENOUS; SUBCUTANEOUS at 14:17

## 2019-01-19 RX ADMIN — FUROSEMIDE 40 MG: 40 TABLET ORAL at 08:50

## 2019-01-19 RX ADMIN — Medication 1 TABLET: at 08:50

## 2019-01-19 RX ADMIN — HEPARIN SODIUM 5000 UNITS: 5000 INJECTION INTRAVENOUS; SUBCUTANEOUS at 05:53

## 2019-01-19 RX ADMIN — CEFTRIAXONE 1000 MG: 1 INJECTION, SOLUTION INTRAVENOUS at 01:30

## 2019-01-19 RX ADMIN — ACETAMINOPHEN 650 MG: 325 TABLET ORAL at 14:17

## 2019-01-19 RX ADMIN — SODIUM CHLORIDE 1000 ML: 0.9 INJECTION, SOLUTION INTRAVENOUS at 01:30

## 2019-01-19 RX ADMIN — PRAVASTATIN SODIUM 40 MG: 40 TABLET ORAL at 16:44

## 2019-01-19 RX ADMIN — ASPIRIN 81 MG 81 MG: 81 TABLET ORAL at 08:50

## 2019-01-19 RX ADMIN — DOCUSATE SODIUM 100 MG: 100 CAPSULE, LIQUID FILLED ORAL at 08:50

## 2019-01-19 NOTE — PHYSICAL THERAPY NOTE
Physical Therapy Evaluation     Patient's Name: Liss Moulton    Admitting Diagnosis  UTI (urinary tract infection) [N39 0]  Weakness [R53 1]    Problem List  Patient Active Problem List   Diagnosis    Hyperlipidemia    PVD [facility]peripheral vascular disease) ([facility] [address] )    Acute cystitis with hematuria    Lightheadedness       Past Medical History  Past Medical History:   Diagnosis Date    Blind left eye     Edema     legs    Fx of fibula     Hypercholesterolemia     Hypertension     Hypotension        Past Surgical History  Past Surgical History:   Procedure Laterality Date    EYE SURGERY      HYSTERECTOMY      THYROID SURGERY        01/19/19 0815   Note Type   Note type Eval only   Pain Assessment   Pain Assessment No/denies pain   Home Living   Type of Home Assisted living   Home Layout One level   Bathroom Equipment Grab bars around toilet   [address] [facility],[address]   Prior Function   Level of [place] Needs assistance with ADLs and functional mobility  (Ax1 for transfers and Indep with w/c)   Lives With Facility staff   Receives Help From Personal care attendant   ADL Assistance Needs assistance   IADLs Needs assistance   Falls in the last 6 months 1 to 4   Vocational Retired   Restrictions/Precautions   Wells Cristopher Bearing Precautions Per Order No   Other Precautions Cognitive; Fall Risk;Multiple lines   General   Family/Caregiver Present No   Cognition   Overall Cognitive Status Impaired   Arousal/Participation Alert   Orientation Level Oriented to person;Oriented to time;Oriented to situation   Following Commands Follows one step commands inconsistently   RLE Assessment   RLE Assessment X   Strength RLE   RLE Overall Strength 3+/5   LLE Assessment   LLE Assessment X   Strength LLE   LLE Overall Strength 3+/5   Coordination   Movements are Fluid and Coordinated 1   Sensation WFL   Bed Mobility   Rolling R 4  Minimal assistance   Additional items Bedrails;Assist x 1;Verbal cues   Rolling L 4  Minimal assistance   Additional items Assist x 1;Bedrails;Verbal cues   Supine to Sit 2  Maximal assistance   Additional items Assist x 1;Verbal cues   Sit to Supine 2  Maximal assistance   Additional items Assist x 1; Impulsive;LE management   Additional Comments sat EOB x 5 mintues   Balance   Static Sitting Fair -   Dynamic Sitting Poor +   Endurance Deficit   Endurance Deficit Yes   Endurance Deficit Description fatigue when sitting   Activity Tolerance   Activity Tolerance Patient limited by pain   Assessment   Prognosis Fair   Problem List Decreased strength;Decreased endurance; Impaired balance;Decreased mobility; Decreased cognition; Impaired judgement;Decreased safety awareness; Impaired hearing; Impaired vision   Assessment Pt is 80 y o  female seen for PT evaluation s/p [facility]18/2019 w/ Acute cystitis with hematuria  PT consulted to assess pt's functional mobility and d/c needs  Order placed for PT eval and tx, w/ up as tolerated order  Comorbidities affecting pt's physical performance at time of assessment include: lightheadedness, L eye blindness, htn, and LE edema  PTA, pt was requiring A for mobility, resident of [facility] and retired  Personal factors affecting pt at time of IE include: decreased cognition, positive fall history, hearing impairments and visual impairments  Please find objective findings from PT assessment regarding body systems outlined above with impairments and limitations including weakness, impaired balance, decreased endurance, decreased activity tolerance, decreased functional mobility tolerance, decreased safety awareness and fall risk  The following objective measures performed on IE also reveal limitations: Barthel Index: 10/100  Pt's clinical presentation is currently unstable/unpredictable seen in pt's presentation of cognition/confusion   Pt to benefit from continued PT tx to address deficits as defined above and maximize level of functional independent mobility and consistency  From PT/mobility standpoint, recommendation at time of d/c would be STR pending progress in order to facilitate return to PLOF  Goals   Patient Goals to see her son   STG Expiration Date 01/23/19   Short Term Goal #1 Pt will: Perform bed mobility tasks to Min  A Of 1 to improve ease of bed mobility  Perform transfers to Mod A Of 1 to improve ease of transfers  Pt will increase BLE strength to 4-/5 to improve functional mobility  Pt will be able to tolerate 10 minutes of OOB activity to decrease fall risk   LTG Expiration Date 01/26/19   Long Term Goal #1 Pt will: Perform bed mobility tasks to  Supervision to improve ease of bed mobility  Perform transfers to [address] [place] [address] [place] to improve ease of transfers  Pt will increase BLE strength to 4/5 to improve functional mobility  Pt will be able to tolerate 15 minutes of OOB activity to decrease fall risk   Treatment Day 1   Plan   Treatment/Interventions Functional transfer training;LE strengthening/ROM; Therapeutic exercise; Endurance training;Bed mobility   PT Frequency 5x/wk   Recommendation   Recommendation Short-term skilled PT   Equipment Recommended Wheelchair;Walker   PT - OK to Discharge Yes   Barthel Index   Feeding 5   Bathing 0   Grooming Score 0   Dressing Score 0   Bladder Score 0   Bowels Score 0   Toilet Use Score 0   Transfers (Bed/Chair) Score 5   Mobility (Level Surface) Score 0   Stairs Score 0   Barthel Index Score 10       Additional treatment provided: BLE AROM 15 reps with monitored rest break between each exercise: ankle pumps, knee extension, hip flexion, hip add/abd with cues for technique      Nadege Ramos, PT

## 2019-01-19 NOTE — OCCUPATIONAL THERAPY NOTE
Occupational Therapy Evaluation      [place]    1/19/2019    Patient Active Problem List   Diagnosis    Hyperlipidemia    PVD (peripheral vascular disease) ([address] )    Acute cystitis with hematuria    Lightheadedness       Past Medical History:   Diagnosis Date    Blind left eye     Edema     legs    Fx of fibula     Hypercholesterolemia     Hypertension     Hypotension        Past Surgical History:   Procedure Laterality Date    EYE SURGERY      HYSTERECTOMY      THYROID SURGERY        01/19/19 2337   Note Type   Note type Eval only   Restrictions/Precautions   Weight Bearing Precautions Per Order No   Other Precautions Fall Risk;Hard of hearing;Visual impairment   Home Living   Type of Home Assisted living   Home Layout One level   [address] [place] [address],[address]; Wheelchair-manual   Additional Comments patient reports w/c used for mobility   Prior Function   Level of [place] Needs assistance with ADLs and functional mobility; Needs assistance with IADLs   Lives With Facility staff   Receives Help From Personal care attendant   ADL Assistance Needs assistance   IADLs Needs assistance   Falls in the last 6 months 1 to 4   ADL   Where Assessed Supine, bed   Eating Assistance 5  Supervision/Setup   Grooming Assistance 5  Supervision/Setup   UB Bathing Assistance 4  Minimal Assistance   LB Bathing Assistance 2  Maximal Assistance   UB Dressing Assistance 4  Minimal Assistance   LB Dressing Assistance 2  Maximal [address]  1  Total Assistance   Bed Mobility   Rolling R 3  Moderate assistance   Rolling L 3  Moderate assistance   Supine to Sit 2  Maximal assistance   Additional items Assist x 1;HOB elevated; Bedrails;Verbal cues   Sit to Supine 2  Maximal assistance   Additional items Assist x 2;Other  (nursing assisted with bed mobility and repositioning)   Transfers   Stand to Sit Unable to assess   Additional items Other  (patient denies ability; reports w/c dependence)   Balance Static Sitting Fair -   Dynamic Sitting Poor +   RUE Assessment   RUE Assessment WFL   LUE Assessment   LUE Assessment WFL   Hand Function   Gross Motor Coordination Functional   Fine Motor Coordination Functional   Vision-Basic Assessment   Current Vision Wears glasses all the time   Visual History (left eye blind)   Cognition   Overall Cognitive Status Impaired   Arousal/Participation Arousable; Cooperative;Lethargic   Attention Attends with cues to redirect   Orientation Level Oriented to person   Memory Decreased recall of recent events;Decreased short term memory   Following Commands Follows one step commands with increased time or repetition   Assessment   Limitation Decreased ADL status; Decreased cognition   Prognosis Fair   Assessment Pt is a 80 y o  female seen for OT evaluation s/p admit to [facility] on 1/18/2019 w/ Acute cystitis with hematuria  Comorbidities affecting pt's functional performance at time of assessment include: limited vision, limited hearing, limited cognition, neuropathy and PVD and reduced cogntion    Personal factors affecting pt at time of IE include:difficulty performing ADLS, limited insight into deficits and limited cognitioin and unable to provide personal history  Prior to admission, pt lived in assisted living and received assistance with ADL's and IADL's  Upon evaluation: Pt requires skilled OT tx 2* the following deficits impacting occupational performance: weakness, decreased balance, decreased tolerance, impaired initiation, impaired memory, impaired problem solving and decreased safety awareness  Pt to benefit from continued skilled OT tx while in the hospital to address deficits as defined above and maximize level of functional independence w ADL's and functional mobility  Occupational Performance areas to address include: eating, grooming, bathing/shower, toilet hygiene, medication management and functional mobility   From OT standpoint, recommendation at time of d/c would be to return to her assisted living situation when medically stable  Goals   Patient Goals "to get better"   ADL Goals   Pt Will Perform All ADL's With min assist  (with cargiver)   Pt Will Perform Eating Independently   Pt Will Perform Grooming Independently   Pt Will Perform Bathing With min assist   Pt Will Perform UE Dressing With setup   Pt Will Perform Toileting With mod assist   Plan   Treatment Interventions ADL retraining;Functional transfer training;UE strengthening/ROM; Endurance training;Cognitive reorientation   Goal Expiration Date 01/29/19   OT Frequency 3-5x/wk   Barthel Index   Feeding 5   Bathing 0   Grooming Score 0   Dressing Score 0   Bladder Score 0   Bowels Score 0   Toilet Use Score 0   Transfers (Bed/Chair) Score 5   Mobility (Level Surface) Score 0   Stairs Score 0   Barthel Index Score 10   Elin Massey, OT

## 2019-01-19 NOTE — ED NOTES
EMS reports that the patient is coming from [facility], staff reports patient has not been acting herself and has a high blood pressure  Patient is complaint free       Kimberly Owen, AYAZ  01/18/19 1619

## 2019-01-19 NOTE — ASSESSMENT & PLAN NOTE
Likely secondary to urinary tract infection in a 80year-old will consult PT OT in a m   Obtain orthostatic vital signs Q shift and hydrate with normal saline at 75 cc/hour

## 2019-01-19 NOTE — NURSING NOTE
Patient received to room 120-2  Pleasant, denies any pain or discomfort  Respirations easy, non-labored on room air  Incontinent of moderate amount of mer urine with a small amount of brownish drainage on pad  Urine is very strong  Incontinence care provided  Admission assessment completed  Oriented to room and alert system  Call bell in reach

## 2019-01-19 NOTE — SOCIAL WORK
Evaluated the pt at the bedside with the son and DIL present  Pt resides at [facility]  Pt reports she receives assistance with ADL's and IADL's  Pt is WC bound  Son is unable to get the pt into his car  Pt will need WC transport back to [facility] when medically stable  Pt states she would not need any services upon discharge  Will call the residential to determine if the pt is a bedhold  CM reviewed d/c planning process including the following: identifying help at home, patient preference for d/c planning needs, availability of treatment team to discuss questions or concerns patient and/or family may have regarding understanding medications and recognizing signs and symptoms once discharged  CM also encouraged patient to follow up with all recommended appointments after discharge  Patient advised of importance for patient and family to participate in managing patients medical well being

## 2019-01-19 NOTE — PLAN OF CARE
Problem: DISCHARGE PLANNING - CARE MANAGEMENT  Goal: Discharge to post-acute care or home with appropriate resources  INTERVENTIONS:  - Conduct assessment to determine patient/family and health care team treatment goals, and need for post-acute services based on payer coverage, community resources, and patient preferences, and barriers to discharge  - Address psychosocial, clinical, and financial barriers to discharge as identified in assessment in conjunction with the patient/family and health care team  - Arrange appropriate level of post-acute services according to patient's   needs and preference and payer coverage in collaboration with the physician and health care team  - Communicate with and update the patient/family, physician, and health care team regarding progress on the discharge plan  - Arrange appropriate transportation to post-acute venues  Will need transport back to [facility]  Outcome: Progressing

## 2019-01-19 NOTE — PLAN OF CARE
Problem: Prexisting or High Potential for Compromised Skin Integrity  Goal: Skin integrity is maintained or improved  INTERVENTIONS:  - Identify patients at risk for skin breakdown  - Assess and monitor skin integrity  - Assess and monitor nutrition and hydration status  - Monitor labs (i e  albumin)  - Assess for incontinence   - Turn and reposition patient  - Assist with mobility/ambulation  - Relieve pressure over bony prominences  - Avoid friction and shearing  - Provide appropriate hygiene as needed including keeping skin clean and dry  - Evaluate need for skin moisturizer/barrier cream  - Collaborate with interdisciplinary team (i e  Nutrition, Rehabilitation, etc )   - Patient/family teaching   Outcome: Progressing      Problem: Potential for Falls  Goal: Patient will remain free of falls  INTERVENTIONS:  - Assess patient frequently for physical needs  -  Identify cognitive and physical deficits and behaviors that affect risk of falls    -  [place] fall precautions as indicated by assessment   - Educate patient/family on patient safety including physical limitations  - Instruct patient to call for assistance with activity based on assessment  - Modify environment to reduce risk of injury  - Consider OT/PT consult to assist with strengthening/mobility   Outcome: Progressing      Problem: PAIN - ADULT  Goal: Verbalizes/displays adequate comfort level or baseline comfort level  Interventions:  - Encourage patient to monitor pain and request assistance  - Assess pain using appropriate pain scale  - Administer analgesics based on type and severity of pain and evaluate response  - Implement non-pharmacological measures as appropriate and evaluate response  - Consider cultural and social influences on pain and pain management  - Notify physician/advanced practitioner if interventions unsuccessful or patient reports new pain  Outcome: Progressing      Problem: INFECTION - ADULT  Goal: Absence or prevention of progression during hospitalization  INTERVENTIONS:  - Assess and monitor for signs and symptoms of infection  - Monitor lab/diagnostic results  - Monitor all insertion sites, i e  indwelling lines, tubes, and drains  - Monitor endotracheal (as able) and nasal secretions for changes in amount and color  - [place] appropriate cooling/warming therapies per order  - Administer medications as ordered  - Instruct and encourage patient and family to use good hand hygiene technique  - Identify and instruct in appropriate isolation precautions for identified infection/condition  Outcome: Progressing    Goal: Absence of fever/infection during neutropenic period  INTERVENTIONS:  - Monitor WBC  - Implement neutropenic guidelines  Outcome: Progressing      Problem: SAFETY ADULT  Goal: Patient will remain free of falls  INTERVENTIONS:  - Assess patient frequently for physical needs  -  Identify cognitive and physical deficits and behaviors that affect risk of falls    -  [place] fall precautions as indicated by assessment   - Educate patient/family on patient safety including physical limitations  - Instruct patient to call for assistance with activity based on assessment  - Modify environment to reduce risk of injury  - Consider OT/PT consult to assist with strengthening/mobility   Outcome: Progressing    Goal: Maintain or return to baseline ADL function  INTERVENTIONS:  -  Assess patient's ability to carry out ADLs; assess patient's baseline for ADL function and identify physical deficits which impact ability to perform ADLs (bathing, care of mouth/teeth, toileting, grooming, dressing, etc )  - Assess/evaluate cause of self-care deficits   - Assess range of motion  - Assess patient's mobility; develop plan if impaired  - Assess patient's need for assistive devices and provide as appropriate  - Encourage maximum independence but intervene and supervise when necessary  ¯ Involve family in performance of ADLs  ¯ Assess for home care needs following discharge   ¯ Request OT consult to assist with ADL evaluation and planning for discharge  ¯ Provide patient education as appropriate  Outcome: Progressing    Goal: Maintain or return mobility status to optimal level  INTERVENTIONS:  - Assess patient's baseline mobility status (ambulation, transfers, stairs, etc )    - Identify cognitive and physical deficits and behaviors that affect mobility  - Identify mobility aids required to assist with transfers and/or ambulation (gait belt, sit-to-stand, lift, walker, cane, etc )  - [place] fall precautions as indicated by assessment  - Record patient progress and toleration of activity level on Mobility SBAR; progress patient to next Phase/Stage  - Instruct patient to call for assistance with activity based on assessment  - Request Rehabilitation consult to assist with strengthening/weightbearing, etc   Outcome: Progressing      Problem: DISCHARGE PLANNING  Goal: Discharge to home or other facility with appropriate resources  INTERVENTIONS:  - Identify barriers to discharge w/patient and caregiver  - Arrange for needed discharge resources and transportation as appropriate  - Identify discharge learning needs (meds, wound care, etc )  - Arrange for interpretive services to assist at discharge as needed  - Refer to Case Management Department for coordinating discharge planning if the patient needs post-hospital services based on physician/advanced practitioner order or complex needs related to functional status, cognitive ability, or social support system  Outcome: Progressing      Problem: Knowledge Deficit  Goal: Patient/family/caregiver demonstrates understanding of disease process, treatment plan, medications, and discharge instructions  Complete learning assessment and assess knowledge base    Interventions:  - Provide teaching at level of understanding  - Provide teaching via preferred learning methods  Outcome: Progressing      Problem: GENITOURINARY - ADULT  Goal: Maintains or returns to baseline urinary function  INTERVENTIONS:  - Assess urinary function  - Encourage oral fluids to ensure adequate hydration  - Administer IV fluids as ordered to ensure adequate hydration  - Administer ordered medications as needed  - Offer frequent toileting  - Follow urinary retention protocol if ordered  Outcome: Progressing    Goal: Absence of urinary retention  INTERVENTIONS:  - Assess patients ability to void and empty bladder  - Monitor I/O  - Bladder scan as needed  - Discuss with physician/AP medications to alleviate retention as needed  - Discuss catheterization for long term situations as appropriate  Outcome: Progressing      Problem: METABOLIC, FLUID AND ELECTROLYTES - ADULT  Goal: Electrolytes maintained within normal limits  INTERVENTIONS:  - Monitor labs and assess patient for signs and symptoms of electrolyte imbalances  - Administer electrolyte replacement as ordered  - Monitor response to electrolyte replacements, including repeat lab results as appropriate  - Instruct patient on fluid and nutrition as appropriate  Outcome: Progressing    Goal: Fluid balance maintained  INTERVENTIONS:  - Monitor labs and assess for signs and symptoms of volume excess or deficit  - Monitor I/O and WT  - Instruct patient on fluid and nutrition as appropriate  Outcome: Progressing    Goal: Glucose maintained within target range  INTERVENTIONS:  - Monitor Blood Glucose as ordered  - Assess for signs and symptoms of hyperglycemia and hypoglycemia  - Administer ordered medications to maintain glucose within target range  - Assess nutritional intake and initiate nutrition service referral as needed  Outcome: Progressing

## 2019-01-19 NOTE — ASSESSMENT & PLAN NOTE
Will admit to Medicine, give Rocephin 1 g IV daily, cultures are currently pending as well as a lactate

## 2019-01-19 NOTE — UTILIZATION REVIEW
Initial Clinical Review    Admission: Date/Time/Statement: 1/19/19 @ 0218   Orders Placed This Encounter   Procedures    Inpatient Admission (expected length of stay for this patient is greater than two midnights)     Standing Status:   Standing     Number of Occurrences:   1     Order Specific Question:   Admitting Physician     Answer:   Cuong Alcantara [[id number]]     Order Specific Question:   Level of Care     Answer:   Med Surg [[id number]]     Order Specific Question:   Estimated length of stay     Answer:   More than 2 Midnights     Order Specific Question:   Certification     Answer:   I certify that inpatient services are medically necessary for this patient for a duration of greater than two midnights  See H&P and MD Progress Notes for additional information about the patient's course of treatment  ED: Date/Time/Mode of Arrival:   ED Arrival Information     Expected Arrival Acuity Means of Arrival Escorted By Service Admission Type    - 1/18/2019 22:13 Urgent [address] [place] [address] Ambulance General Medicine Urgent    Arrival Complaint    weakness        Chief Complaint:   Chief Complaint   Patient presents with    Dizziness     Patient states she has dizzy spells, alst one 1700 today  Currently denies dizziness     History of Illness: 40-year-old female with history of hypertension, hyperlipidemia and left eye blindness presents for evaluation of lightheadedness starting this evening  Patient reports she was eating dinner and felt as if she was going to pass out  Patient with mild dizziness at that time    Patient currently reports continued lightheadedness at this time  ED Vital Signs:   ED Triage Vitals [01/18/19 2248]   Temperature Pulse Respirations Blood Pressure SpO2   98 4 °F (36 9 °C) 82 16 105/65 94 %      Temp Source Heart Rate Source Patient Position - Orthostatic VS BP Location FiO2 (%)   Temporal Monitor Lying Left arm --      Pain Score       No Pain        Wt Readings from Last 1 Encounters: 01/18/19 77 1 kg (170 lb)     Troponin <0 03  Lactic acid  1 0  Urine  Innumerable wbcs and bacteria  (+) nitrite  Clarity  Turbid  (+) 1 blood  bnp  58  Ck 18  Wbc 11 10 neutrophils  82  Abs neutrophils 9 20  ekg complete rbbb    ED Treatment:   Medication Administration from 01/18/2019 2213 to 01/19/2019 8092       Date/Time Order Dose Route Action Action by Comments     01/19/2019 0204 sodium chloride 0 9 % bolus 1,000 mL 0 mL Intravenous Stopped Mirtha Hines RN      01/19/2019 0016 sodium chloride 0 9 % bolus 1,000 mL 1,000 mL Intravenous New Bag Grady Singleton RN      01/19/2019 0130 sodium chloride 0 9 % bolus 1,000 mL 1,000 mL Intravenous New Bag Mirtha Hines RN      01/19/2019 0200 cefTRIAXone (ROCEPHIN) IVPB (premix) 1,000 mg 0 mg Intravenous Stopped Mirtha Hines RN      01/19/2019 0130 cefTRIAXone (ROCEPHIN) IVPB (premix) 1,000 mg 1,000 mg Intravenous New Bag Mirtha Hines RN         Past Medical/Surgical History:    Active Ambulatory Problems     Diagnosis Date Noted    Hyperlipidemia 07/30/2018    PVD (peripheral vascular disease) ([facility] [address] ) 07/30/2018     Resolved Ambulatory Problems     Diagnosis Date Noted    No Resolved Ambulatory Problems     Past Medical History:   Diagnosis Date    Blind left eye     Edema     Fx of fibula     Hypercholesterolemia     Hypertension     Hypotension      Admitting Diagnosis: UTI (urinary tract infection) [N39 0]  Weakness [R53 1]  Age/Sex: 80 y o  female  Assessment/Plan: present to er with the feeling she is going to pass out  Dizziness light headedness and confusion  Admission Orders:  Scheduled Meds:   Current Facility-Administered Medications:  acetaminophen 650 mg Oral Q6H PRN Guanakito Banks PA-C   aspirin 81 mg Oral Daily Guanakito Banks PA-C   [START ON 1/20/2019] cefTRIAXone 1,000 mg Intravenous Q24H Guanakito Banks PA-C   docusate sodium 100 mg Oral BID Guanakito Banks PA-C   fish oil 1,000 mg Oral Daily Guanakito Banks PA-C furosemide 40 mg Oral Daily Pablo Pineda PA-C   heparin (porcine) 5,000 Units Subcutaneous [facility] Pablo Pineda PA-C   latanoprost 1 drop Both Eyes HS Pablo Pineda PA-C   multivitamin-minerals 1 tablet Oral Daily Pablo Pineda PA-C   ondansetron 4 mg Intravenous Q8H PRN Pablo Pineda PA-C   pravastatin 40 mg Oral Daily With Danis Ragland PA-C   sodium chloride  Both Eyes Q3H PRN Pablo Pineda PA-C   sodium chloride 75 mL/hr Intravenous Continuous Pablo Pineda PA-C     Compression device   Daily wt  I/o  Troponin x 3  <0 03  X 2  Pt/ot eval and treat

## 2019-01-19 NOTE — ASSESSMENT & PLAN NOTE
Likely secondary to urinary tract infection in a 80year-old will consult PT OT in a m   Obtain orthostatic vital signs Q shift

## 2019-01-19 NOTE — PLAN OF CARE
Problem: PHYSICAL THERAPY ADULT  Goal: Performs mobility at highest level of function for planned discharge setting  See evaluation for individualized goals  Treatment/Interventions: Functional transfer training, LE strengthening/ROM, Therapeutic exercise, Endurance training, Bed mobility  Equipment Recommended: Wheelchair, Merlin Sovereign       See flowsheet documentation for full assessment, interventions and recommendations  Outcome: Progressing  Prognosis: Fair  Problem List: Decreased strength, Decreased endurance, Impaired balance, Decreased mobility, Decreased cognition, Impaired judgement, Decreased safety awareness, Impaired hearing, Impaired vision  Assessment: Pt is 80 y o  female seen for PT evaluation s/p admit to [facility] on 1/18/2019 w/ Acute cystitis with hematuria  PT consulted to assess pt's functional mobility and d/c needs  Order placed for PT eval and tx, w/ up as tolerated order  Comorbidities affecting pt's physical performance at time of assessment include: lightheadedness, L eye blindness, htn, and LE edema  PTA, pt was requiring A for mobility, resident of [facility] and retired  Personal factors affecting pt at time of IE include: decreased cognition, positive fall history, hearing impairments and visual impairments  Please find objective findings from PT assessment regarding body systems outlined above with impairments and limitations including weakness, impaired balance, decreased endurance, decreased activity tolerance, decreased functional mobility tolerance, decreased safety awareness and fall risk  The following objective measures performed on IE also reveal limitations: Barthel Index: 10/100  Pt's clinical presentation is currently unstable/unpredictable seen in pt's presentation of cognition/confusion  Pt to benefit from continued PT tx to address deficits as defined above and maximize level of functional independent mobility and consistency   From PT/mobility standpoint, recommendation at time of d/c would be STR pending progress in order to facilitate return to PLOF  Recommendation: Short-term skilled PT     PT - OK to Discharge: Yes    See flowsheet documentation for full assessment     Mariza Louis PT

## 2019-01-19 NOTE — PLAN OF CARE
Problem: OCCUPATIONAL THERAPY ADULT  Goal: Performs self-care activities at highest level of function for planned discharge setting  See evaluation for individualized goals  Limitation: Decreased ADL status, Decreased cognition  Prognosis: Fair  Assessment: Pt is a 80 y o  female seen for OT evaluation s/p admit to [facility] on 1/18/2019 w/ Acute cystitis with hematuria  Comorbidities affecting pt's functional performance at time of assessment include: limited vision, limited hearing, limited cognition, neuropathy and PVD and reduced cogntion    Personal factors affecting pt at time of IE include:difficulty performing ADLS, limited insight into deficits and limited cognitioin and unable to provide personal history  Prior to admission, pt lived in assisted living and received assistance with ADL's and IADL's  Upon evaluation: Pt requires skilled OT tx 2* the following deficits impacting occupational performance: weakness, decreased balance, decreased tolerance, impaired initiation, impaired memory, impaired problem solving and decreased safety awareness  Pt to benefit from continued skilled OT tx while in the hospital to address deficits as defined above and maximize level of functional independence w ADL's and functional mobility  Occupational Performance areas to address include: eating, grooming, bathing/shower, toilet hygiene, medication management and functional mobility  From OT standpoint, recommendation at time of d/c would be to return to her assisted living situation when medically stable           Valeriano Carrier, OT

## 2019-01-19 NOTE — ED PROVIDER NOTES
History  Chief Complaint   Patient presents with    Dizziness     Patient states she has dizzy spells, alst one 1700 today  Currently denies dizziness     79-year-old female with history of hypertension, hyperlipidemia and left eye blindness presents for evaluation of lightheadedness starting this evening  Patient reports she was eating dinner and felt as if she was going to pass out  Patient with mild dizziness at that time  Patient currently reports continued lightheadedness at this time  Otherwise patient with no recent fevers, chills, chest pain, shortness of breath, abdominal pain, nausea or vomiting  History provided by:  Patient and relative   used: No        Prior to Admission Medications   Prescriptions Last Dose Informant Patient Reported? Taking?    Acetaminophen (APAP) 325 MG tablet   Yes No   Sig: Take 650 mg by mouth every 4 (four) hours as needed for mild pain   Coenzyme Q10 (CO Q 10) 100 MG CAPS   Yes No   Sig: Take by mouth   Difluprednate (DUREZOL) 0 05 % EMUL   Yes No   Sig: Apply to eye Install 1 drop into right eye every morning   Multiple Vitamin (MULTIVITAMINS PO)   Yes No   Sig: Take by mouth daily   Omega-3 Fatty Acids (OMEGA 3 PO)   Yes No   Sig: Take by mouth daily   aspirin 81 MG tablet   Yes No   Sig: Take 81 mg by mouth daily   docusate sodium (COLACE) 100 mg capsule   Yes No   Sig: Take 100 mg by mouth 2 (two) times a day   furosemide (LASIX) 40 mg tablet   Yes No   Sig: Take 40 mg by mouth daily   latanoprost (XALATAN) 0 005 % ophthalmic solution   Yes No   simvastatin (ZOCOR) 20 mg tablet   Yes No   Sig: Take 20 mg by mouth daily   sodium chloride (IRISH 128) 5 % hypertonic ophthalmic ointment   Yes No   Si drop daily      Facility-Administered Medications: None       Past Medical History:   Diagnosis Date    Blind left eye     Edema     legs    Fx of fibula     Hypercholesterolemia     Hypertension     Hypotension        Past Surgical History: Procedure Laterality Date    EYE SURGERY      HYSTERECTOMY      THYROID SURGERY         History reviewed  No pertinent family history  I have reviewed and agree with the history as documented  Social History   Substance Use Topics    Smoking status: Never Smoker    Smokeless tobacco: Never Used    Alcohol use No        Review of Systems   Constitutional: Negative for chills and fever  HENT: Negative for rhinorrhea and sore throat  Eyes: Negative for visual disturbance  Respiratory: Negative for cough and shortness of breath  Cardiovascular: Negative for chest pain and leg swelling  Gastrointestinal: Negative for abdominal pain, diarrhea, nausea and vomiting  Genitourinary: Negative for dysuria  Musculoskeletal: Negative for back pain and myalgias  Skin: Negative for rash  Neurological: Positive for dizziness and light-headedness  Negative for headaches  Psychiatric/Behavioral: Positive for confusion  All other systems reviewed and are negative  Physical Exam  Physical Exam   Constitutional: She is oriented to person, place, and time  She appears well-developed and well-nourished  HENT:   Nose: Nose normal    Mouth/Throat: Oropharynx is clear and moist  No oropharyngeal exudate  + clouded left cornea   Eyes: Pupils are equal, round, and reactive to light  Conjunctivae and EOM are normal  No scleral icterus  Neck: Normal range of motion  Neck supple  No JVD present  No tracheal deviation present  Cardiovascular: Normal rate, regular rhythm and normal heart sounds  No murmur heard  Pulmonary/Chest: Effort normal and breath sounds normal  No respiratory distress  She has no wheezes  She has no rales  Abdominal: Soft  Bowel sounds are normal  There is no tenderness  There is no guarding  Musculoskeletal: Normal range of motion  She exhibits no edema or tenderness  Neurological: She is alert and oriented to person, place, and time   No cranial nerve deficit or sensory deficit  She exhibits normal muscle tone  5/5 motor, nl sens   Skin: Skin is warm and dry  Psychiatric: She has a normal mood and affect  Her behavior is normal    Nursing note and vitals reviewed  Vital Signs  ED Triage Vitals [01/18/19 2248]   Temperature Pulse Respirations Blood Pressure SpO2   98 4 °F (36 9 °C) 82 16 105/65 94 %      Temp Source Heart Rate Source Patient Position - Orthostatic VS BP Location FiO2 (%)   Temporal Monitor Lying Left arm --      Pain Score       No Pain           Vitals:    01/19/19 0130 01/19/19 0145 01/19/19 0208 01/19/19 0229   BP: 100/59  104/55 106/58   Pulse: 83 87  83   Patient Position - Orthostatic VS:    Lying       Visual Acuity      ED Medications  Medications   sodium chloride 0 9 % bolus 1,000 mL (0 mL Intravenous Stopped 1/19/19 0204)   sodium chloride 0 9 % bolus 1,000 mL (1,000 mL Intravenous New Bag 1/19/19 0130)   cefTRIAXone (ROCEPHIN) IVPB (premix) 1,000 mg (1,000 mg Intravenous New Bag 1/19/19 0130)       Diagnostic Studies  Results Reviewed     Procedure Component Value Units Date/Time    Troponin I [[id number]]  (Normal) Collected:  01/19/19 0221    Lab Status:  Final result Specimen:  Blood from Arm, Left Updated:  01/19/19 0250     Troponin I <0 03 ng/mL     Troponin I [[id number]]     Lab Status:  No result Specimen:  Blood     Lactic acid, plasma [[id number]]  (Normal) Collected:  01/19/19 0208    Lab Status:  Final result Specimen:  Blood from Arm, Right Updated:  01/19/19 0237     LACTIC ACID 1 0 mmol/L     Narrative:         Result may be elevated if tourniquet was used during collection  Blood culture #2 [[id number]] Collected:  01/19/19 0208    Lab Status: In process Specimen:  Blood from Arm, Right Updated:  01/19/19 0212    Blood culture #1 [[id number]] Collected:  01/19/19 0208    Lab Status:   In process Specimen:  Blood from Arm, Right Updated:  01/19/19 0212    Urine Microscopic [[id number]]  (Abnormal) Collected:  01/19/19 0106 Lab Status:  Final result Specimen:  Urine from Urine, Straight Cath Updated:  01/19/19 0119     RBC, UA 10-20 (A) /hpf      WBC, UA Innumerable (A) /hpf      Epithelial Cells Occasional /hpf      Bacteria, UA Innumerable (A) /hpf      MUCUS THREADS Moderate (A)    Urine culture [[id number]] Collected:  01/19/19 0106    Lab Status:   In process Specimen:  Urine from Urine, Straight Cath Updated:  01/19/19 0119    UA w Reflex to Microscopic w Reflex to Culture [[id number]]  (Abnormal) Collected:  01/19/19 0106    Lab Status:  Final result Specimen:  Urine from Urine, Straight Cath Updated:  01/19/19 0118     Color, UA Yellow     Clarity, UA Turbid (A)     Specific Gravity, UA 1 020     pH, UA 6 5     Leukocytes, UA 3+ (A)     Nitrite, UA Positive (A)     Protein, UA Negative mg/dl      Glucose, UA Negative mg/dl      Ketones, UA Negative mg/dl      Urobilinogen, UA 0 2 E U /dl      Bilirubin, UA Negative     Blood, UA 1+ (A)    B-Type Natriuretic Peptide [facility] ONLY) [[id number]]  (Normal) Collected:  01/18/19 2347    Lab Status:  Final result Specimen:  Blood from Arm, Left Updated:  01/19/19 0018     BNP 58 pg/mL     CK Total with Reflex CKMB [[id number]]  (Abnormal) Collected:  01/18/19 2347    Lab Status:  Final result Specimen:  Blood from Arm, Left Updated:  01/19/19 0015     Total CK 18 (L) U/L     Troponin I [[id number]]  (Normal) Collected:  01/18/19 2347    Lab Status:  Final result Specimen:  Blood from Arm, Left Updated:  01/19/19 0015     Troponin I <0 03 ng/mL     Comprehensive metabolic panel [[id number]]  (Abnormal) Collected:  01/18/19 2347    Lab Status:  Final result Specimen:  Blood from Arm, Left Updated:  01/19/19 0014     Sodium 135 mmol/L      Potassium 4 2 mmol/L      Chloride 100 mmol/L      CO2 31 mmol/L      ANION GAP 4 mmol/L      BUN 17 mg/dL      Creatinine 0 85 mg/dL      Glucose 116 (H) mg/dL      Calcium 9 5 mg/dL      AST 14 U/L      ALT 9 U/L      Alkaline Phosphatase 98 U/L      Total Protein 6 6 g/dL      Albumin 3 9 g/dL      Total Bilirubin 0 50 mg/dL      eGFR 58 ml/min/1 73sq m     Narrative:         National Kidney Disease Education Program recommendations are as follows:  GFR calculation is accurate only with a steady state creatinine  Chronic Kidney disease less than 60 ml/min/1 73 sq  meters  Kidney failure less than 15 ml/min/1 73 sq  meters      Protime-INR [[id number]]  (Normal) Collected:  01/18/19 2347    Lab Status:  Final result Specimen:  Blood from Arm, Left Updated:  01/19/19 0005     Protime 12 8 seconds      INR 1 10    APTT [[id number]]  (Normal) Collected:  01/18/19 2347    Lab Status:  Final result Specimen:  Blood from Arm, Left Updated:  01/19/19 0005     PTT 34 seconds     CBC and differential [[id number]]  (Abnormal) Collected:  01/18/19 2348    Lab Status:  Final result Specimen:  Blood from Arm, Left Updated:  01/18/19 2355     WBC 11 10 (H) Thousand/uL      RBC 5 02 Million/uL      Hemoglobin 15 1 g/dL      Hematocrit 46 4 (H) %      MCV 93 fL      MCH 30 0 pg      MCHC 32 5 g/dL      RDW 13 4 %      MPV 8 8 fL      Platelets 957 Thousands/uL      nRBC 0 /100 WBCs      Neutrophils Relative 82 (H) %      Lymphocytes Relative 8 (L) %      Monocytes Relative 10 %      Eosinophils Relative 0 %      Basophils Relative 1 %      Neutrophils Absolute 9 20 (H) Thousands/µL      Lymphocytes Absolute 0 80 Thousands/µL      Monocytes Absolute 1 10 Thousand/µL      Eosinophils Absolute 0 00 Thousand/µL      Basophils Absolute 0 10 Thousands/µL     Fingerstick Glucose (POCT) [[id number]]  (Normal) Collected:  01/18/19 2313    Lab Status:  Final result Updated:  01/18/19 2321     POC Glucose 91 mg/dl                  XR chest 1 view portable    (Results Pending)              Procedures  ECG 12 Lead Documentation  Date/Time: 1/18/2019 11:18 PM  Performed by: Prudence Garland  Authorized by: Prudence Garland     Indications / Diagnosis:  Dizziness  ECG reviewed by me, the ED Provider: yes    Patient location:  ED  Previous ECG:     Previous ECG:  Unavailable    Comparison to cardiac monitor: Yes    Interpretation:     Interpretation: abnormal    Rate:     ECG rate:  87 beats per minute    ECG rate assessment: normal    Rhythm:     Rhythm: sinus rhythm    Ectopy:     Ectopy: none    QRS:     QRS axis:  Right  Conduction:     Conduction: abnormal      Abnormal conduction: complete RBBB    ST segments:     ST segments:  Normal  T waves:     T waves: normal               Phone Contacts  ED Phone Contact    ED Course  ED Course as of Jan 19 0251 Fri Jan 18, 2019   2250 Patient seen examined at bedside  Labs, imaging and EKG ordered  Sat Jan 19, 2019   0130 Labs reviewed, positive UTI  Blood cultures and lactic acid ordered  Ceftriaxone 1 g and additional NS bolus ordered  0200 Hospitalist contacted and case discussed  Patient be admitted for UTI  MDM  CritCare Time    Disposition  Final diagnoses:   UTI (urinary tract infection)     Time reflects when diagnosis was documented in both MDM as applicable and the Disposition within this note     Time User Action Codes Description Comment    1/19/2019  2:15 AM Ruma Bell Add [N39 0] UTI (urinary tract infection)       ED Disposition     ED Disposition Condition Comment    Admit  Case was discussed with Hospitalist PA and the patient's admission status was agreed to be Admission Status: inpatient status to the service of Dr Lanie Murcia    None         Patient's Medications   Discharge Prescriptions    No medications on file     No discharge procedures on file      ED Provider  Electronically Signed by           Nicole Prince DO  01/19/19 9443

## 2019-01-20 LAB
ANION GAP SERPL CALCULATED.3IONS-SCNC: 7 MMOL/L (ref 4–13)
BASOPHILS # BLD AUTO: 0 THOUSANDS/ΜL (ref 0–0.1)
BASOPHILS NFR BLD AUTO: 1 % (ref 0–2)
BUN SERPL-MCNC: 11 MG/DL (ref 7–25)
CALCIUM SERPL-MCNC: 8.6 MG/DL (ref 8.6–10.5)
CHLORIDE SERPL-SCNC: 106 MMOL/L (ref 98–107)
CO2 SERPL-SCNC: 27 MMOL/L (ref 21–31)
CREAT SERPL-MCNC: 0.64 MG/DL (ref 0.6–1.2)
EOSINOPHIL # BLD AUTO: 0.1 THOUSAND/ΜL (ref 0–0.61)
EOSINOPHIL NFR BLD AUTO: 1 % (ref 0–5)
ERYTHROCYTE [DISTWIDTH] IN BLOOD BY AUTOMATED COUNT: 13.7 % (ref 11.5–14.5)
GFR SERPL CREATININE-BSD FRML MDRD: 76 ML/MIN/1.73SQ M
GLUCOSE SERPL-MCNC: 92 MG/DL (ref 65–99)
HCT VFR BLD AUTO: 41.5 % (ref 34.8–46.1)
HGB BLD-MCNC: 14 G/DL (ref 12–16)
LYMPHOCYTES # BLD AUTO: 1 THOUSANDS/ΜL (ref 0.6–4.47)
LYMPHOCYTES NFR BLD AUTO: 16 % (ref 21–51)
MCH RBC QN AUTO: 30.9 PG (ref 26–34)
MCHC RBC AUTO-ENTMCNC: 33.7 G/DL (ref 31–37)
MCV RBC AUTO: 92 FL (ref 81–99)
MONOCYTES # BLD AUTO: 1 THOUSAND/ΜL (ref 0.17–1.22)
MONOCYTES NFR BLD AUTO: 16 % (ref 2–12)
NEUTROPHILS # BLD AUTO: 4.2 THOUSANDS/ΜL (ref 1.4–6.5)
NEUTS SEG NFR BLD AUTO: 67 % (ref 42–75)
NRBC BLD AUTO-RTO: 0 /100 WBCS
PLATELET # BLD AUTO: 169 THOUSANDS/UL (ref 149–390)
PMV BLD AUTO: 9 FL (ref 8.6–11.7)
POTASSIUM SERPL-SCNC: 3.6 MMOL/L (ref 3.5–5.5)
RBC # BLD AUTO: 4.52 MILLION/UL (ref 3.9–5.2)
SODIUM SERPL-SCNC: 140 MMOL/L (ref 134–143)
WBC # BLD AUTO: 6.3 THOUSAND/UL (ref 4.8–10.8)

## 2019-01-20 PROCEDURE — 99232 SBSQ HOSP IP/OBS MODERATE 35: CPT | Performed by: HOSPITALIST

## 2019-01-20 PROCEDURE — 85025 COMPLETE CBC W/AUTO DIFF WBC: CPT | Performed by: HOSPITALIST

## 2019-01-20 PROCEDURE — 80048 BASIC METABOLIC PNL TOTAL CA: CPT | Performed by: HOSPITALIST

## 2019-01-20 RX ADMIN — ASPIRIN 81 MG 81 MG: 81 TABLET ORAL at 08:15

## 2019-01-20 RX ADMIN — HEPARIN SODIUM 5000 UNITS: 5000 INJECTION INTRAVENOUS; SUBCUTANEOUS at 05:37

## 2019-01-20 RX ADMIN — HEPARIN SODIUM 5000 UNITS: 5000 INJECTION INTRAVENOUS; SUBCUTANEOUS at 22:36

## 2019-01-20 RX ADMIN — Medication 1 TABLET: at 08:15

## 2019-01-20 RX ADMIN — LATANOPROST 1 DROP: 50 SOLUTION OPHTHALMIC at 22:35

## 2019-01-20 RX ADMIN — DOCUSATE SODIUM 100 MG: 100 CAPSULE, LIQUID FILLED ORAL at 08:15

## 2019-01-20 RX ADMIN — HEPARIN SODIUM 5000 UNITS: 5000 INJECTION INTRAVENOUS; SUBCUTANEOUS at 14:14

## 2019-01-20 RX ADMIN — CEFTRIAXONE 1000 MG: 1 INJECTION, SOLUTION INTRAVENOUS at 02:40

## 2019-01-20 RX ADMIN — FUROSEMIDE 40 MG: 40 TABLET ORAL at 08:15

## 2019-01-20 RX ADMIN — OMEGA-3 FATTY ACIDS CAP 1000 MG 1000 MG: 1000 CAP at 08:15

## 2019-01-20 RX ADMIN — DOCUSATE SODIUM 100 MG: 100 CAPSULE, LIQUID FILLED ORAL at 17:05

## 2019-01-20 RX ADMIN — PRAVASTATIN SODIUM 40 MG: 40 TABLET ORAL at 15:36

## 2019-01-20 NOTE — PROGRESS NOTES
Progress Note - Yoan Richardson 6/15/1923, 80 y o  female MRN: [id number]    Unit/Bed#: -02 Encounter: [id number]    Primary Care Provider: Rea Hernandez MD   Date and time admitted to hospital: 2019 10:39 PM        * Acute cystitis with hematuria   Assessment & Plan    · Patient has an E coli UTI  · Continue IV ceftriaxone  · Await final sensitivities  · Patient is stable for discharge however I was notified by case management that her nursing facility does not accept patients back on Sundays  · Hopeful discharge planning 1st thing tomorrow morning     Hyperlipidemia   Assessment & Plan    · Continue pravastatin     Lightheadedness   Assessment & Plan    · Resolved  · Most likely secondary to the infection     PVD (peripheral vascular disease) ([facility] [address] )   Assessment & Plan    · Continue pre-hospital aspirin 81 mg p o  Daily         VTE Pharmacologic Prophylaxis: Pharmacologic: Heparin    Patient Centered Rounds: I have performed bedside rounds with nursing staff today  Discussions with Specialists or Other Care Team Provider:  Case discussed with nursing and case management  Education and Discussions with Family / Patient:  Patient brought up to par with the plan of care, no family members were present at the time of my bedside evaluation    Time Spent for Care: 20 minutes  More than 50% of total time spent on counseling and coordination of care as described above  Current Length of Stay: 1 day(s)    Current Patient Status: Inpatient   Certification Statement: The patient will continue to require additional inpatient hospital stay due to The need for continued IV antibiotics    Discharge Plan:  Hopeful discharge planning 1st thing tomorrow morning    Code Status: Level 1 - Full Code    Subjective:   Patient seen and examined  No new complaints no distress       Objective:     Vitals:   Temp (24hrs), Av 4 °F (36 9 °C), Min:97 5 °F (36 4 °C), Max:99 3 °F (37 4 °C)    Temp:  [97 5 °F (36 4 °C)-99 3 °F (37 4 °C)] 97 5 °F (36 4 °C)  HR:  [87-90] 90  Resp:  [18] 18  BP: ()/(51-76) 120/51  SpO2:  [94 %-97 %] 94 %  Body mass index is 29 33 kg/m²  Input and Output Summary (last 24 hours): Intake/Output Summary (Last 24 hours) at 01/20/19 1128  Last data filed at 01/19/19 1300   Gross per 24 hour   Intake              240 ml   Output                0 ml   Net              240 ml       Physical Exam:     Physical Exam   Constitutional: She is oriented to person, place, and time  She appears well-developed and well-nourished  HENT:   Head: Normocephalic and atraumatic  Nose: Nose normal    Mouth/Throat: Oropharynx is clear and moist    Eyes: Pupils are equal, round, and reactive to light  Conjunctivae and EOM are normal    Neck: Normal range of motion  Neck supple  No JVD present  No thyromegaly present  Cardiovascular: Normal rate, regular rhythm and intact distal pulses  Exam reveals no gallop and no friction rub  No murmur heard  Pulmonary/Chest: Effort normal and breath sounds normal  No respiratory distress  Abdominal: Soft  Bowel sounds are normal  She exhibits no distension and no mass  There is no tenderness  There is no guarding  Musculoskeletal: Normal range of motion  She exhibits no edema  Lymphadenopathy:     She has no cervical adenopathy  Neurological: She is alert and oriented to person, place, and time  No cranial nerve deficit  Skin: Skin is warm  No rash noted  No erythema  Psychiatric: She has a normal mood and affect  Her behavior is normal    Vitals reviewed        Additional Data:     Labs:      Results from last 7 days  Lab Units 01/20/19  0510   WBC Thousand/uL 6 30   HEMOGLOBIN g/dL 14 0   HEMATOCRIT % 41 5   PLATELETS Thousands/uL 169   NEUTROS PCT % 67   LYMPHS PCT % 16*   MONOS PCT % 16*   EOS PCT % 1       Results from last 7 days  Lab Units 01/20/19  0510 01/18/19  2347   POTASSIUM mmol/L 3 6 4 2   CHLORIDE mmol/L 106 100   CO2 mmol/L 27 31 BUN mg/dL 11 17   CREATININE mg/dL 0 64 0 85   CALCIUM mg/dL 8 6 9 5   ALK PHOS U/L  --  98   ALT U/L  --  9   AST U/L  --  14       Results from last 7 days  Lab Units 01/18/19  2347   INR  1 10       Results from last 7 days  Lab Units 01/18/19  2313   POC GLUCOSE mg/dl 91           * I Have Reviewed All Lab Data Listed Above  * Additional Pertinent Lab Tests Reviewed: [facility] 66 Admission  Reviewed    Imaging:  Imaging Reports Reviewed Today Include:  None    Recent Cultures (last 7 days):       Results from last 7 days  Lab Units 01/19/19  0106   URINE CULTURE  >100,000 cfu/ml Gram Negative Steven resembling Escherichia coli*       Last 24 Hours Medication List:     Current Facility-Administered Medications:  acetaminophen 650 mg Oral Q6H PRN Juliane Shay PA-C    aspirin 81 mg Oral Daily Juliane Shay PA-C    cefTRIAXone 1,000 mg Intravenous Q24H Juliane Shay PA-C Last Rate: 1,000 mg (01/20/19 0240)   docusate sodium 100 mg Oral BID Juliane Shay PA-C    fish oil 1,000 mg Oral Daily Juliane Shay, PA-C    furosemide 40 mg Oral Daily Juliane Shay, PA-C    heparin (porcine) 5,000 Units Subcutaneous [facility] Juliane Shay PA-C    latanoprost 1 drop Both Eyes HS Juliane Shay PA-C    multivitamin-minerals 1 tablet Oral Daily Juliane Shay PA-C    ondansetron 4 mg Intravenous Q8H PRN Juliane Shay PA-C    pravastatin 40 mg Oral Daily With Dung Chin PA-C    sodium chloride  Both Eyes Q3H PRN LUKE BoydC         Today, Patient Was Seen By: Alton Couch MD    ** Please Note: Dictation voice to text software may have been used in the creation of this document   **

## 2019-01-20 NOTE — ASSESSMENT & PLAN NOTE
· Patient has an E coli UTI  · Continue IV ceftriaxone  · Await final sensitivities  · Patient is stable for discharge however I was notified by case management that her nursing facility does not accept patients back on Sundays  · Hopeful discharge planning 1st thing tomorrow morning

## 2019-01-20 NOTE — PHYSICIAN ADVISOR
Current patient class: Inpatient  The patient is currently on Hospital Day: 2 at [address]      The patient was admitted to the hospital at [phone] on 1/19/19 for the following diagnosis:  UTI (urinary tract infection) [N39 0]  Weakness [R53 1]       There is documentation in the medical record of an expected length of stay of at least 2 midnights  The patient is therefore expected to satisfy the 2 midnight benchmark and given the 2 midnight presumption is appropriate for INPATIENT ADMISSION  Given this expectation of a satisfying stay, CMS instructs us that the patient is most often appropriate for inpatient admission under part A provided medical necessity is documented in the chart  After review of the relevant documentation, labs, vital signs and test results, the patient is appropriate for INPATIENT ADMISSION  Admission to the hospital as an inpatient is a complex decision making process which requires the practitioner to consider the patients presenting complaint, history and physical examination and all relevant testing  With this in mind, in this case, the patient was deemed appropriate for INPATIENT ADMISSION  After review of the documentation and testing available at the time of the admission I concur with this clinical determination of medical necessity  Rationale is as follows: The patient is a 80 yrs old Female who presented to the ED at 1/18/2019 10:39 PM with a chief complaint of Dizziness (Patient states she has dizzy spells, alst one 1700 today  Currently denies dizziness)     Given the need for further hospitalization, and along with the documentation of medical necessity present in the chart, the patient is appropriate for inpatient admission  The patient is expected to satisfy the 2 midnight benchmark, and will require further acute medical care  The patient does have comorbid conditions which increases the risk for significant adverse outcome  Given this the patient is appropriate for inpatient admission  The patients vitals on arrival were ED Triage Vitals [01/18/19 2248]   Temperature Pulse Respirations Blood Pressure SpO2   98 4 °F (36 9 °C) 82 16 105/65 94 %      Temp Source Heart Rate Source Patient Position - Orthostatic VS BP Location FiO2 (%)   Temporal Monitor Lying Left arm --      Pain Score       No Pain           Past Medical History:   Diagnosis Date    Blind left eye     Edema     legs    Fx of fibula     Hypercholesterolemia     Hypertension     Hypotension      Past Surgical History:   Procedure Laterality Date    EYE SURGERY      HYSTERECTOMY      THYROID SURGERY             Consults have been placed to:   IP CONSULT TO CASE MANAGEMENT    Vitals:    01/19/19 0208 01/19/19 0229 01/19/19 0320 01/19/19 0725   BP: 104/55 106/58 92/61 111/62   BP Location: Left arm Left arm Left arm Left arm   Pulse:  83 78 72   Resp:  22 20 16   Temp:  97 8 °F (36 6 °C) 97 7 °F (36 5 °C) (!) 97 4 °F (36 3 °C)   TempSrc:  Temporal Tympanic Temporal   SpO2:  96% 94% 96%   Weight:       Height:   5' 4" (1 626 m)        Most recent labs:    Recent Labs      01/18/19   2347   01/19/19   0221 01/19/19   0718   WBC   --    < >   --   7 50   HGB   --    < >   --   14 4   HCT   --    < >   --   43 7   PLT   --    < >   --   182   K  4 2   --    --    --    CALCIUM  9 5   --    --    --    BUN  17   --    --    --    CREATININE  0 85   --    --    --    INR  1 10   --    --    --    TROPONINI  <0 03   --   <0 03   --    CKTOTAL  18*   --    --    --    AST  14   --    --    --    ALT  9   --    --    --    ALKPHOS  98   --    --    --     < > = values in this interval not displayed         Scheduled Meds:  Current Facility-Administered Medications:  acetaminophen 650 mg Oral Q6H PRN Max Donate, PA-RADHIKA   aspirin 81 mg Oral Daily Max Donate, PA-RADHIKA   [START ON 1/20/2019] cefTRIAXone 1,000 mg Intravenous Q24H Max Donate, PA-RADHIKA   docusate sodium 100 mg Oral BID Pascual Anita, PA-C   fish oil 1,000 mg Oral Daily Pascual Anita, PA-C   furosemide 40 mg Oral Daily Pascual Anita, PA-C   heparin (porcine) 5,000 Units Subcutaneous [facility] Pascual Anita, PA-C   latanoprost 1 drop Both Eyes HS Pascual Anita, PA-C   multivitamin-minerals 1 tablet Oral Daily Pascual Anita, PA-C   ondansetron 4 mg Intravenous Q8H PRN Pascual Anita, PA-C   pravastatin 40 mg Oral Daily With If You Can, PA-C   sodium chloride  Both Eyes Q3H PRN Pascual Anita, PA-C   sodium chloride 75 mL/hr Intravenous Continuous Pascual Anita, PA-C     Continuous Infusions:  sodium chloride 75 mL/hr     PRN Meds:   acetaminophen    ondansetron    sodium chloride    Surgical procedures (if appropriate):

## 2019-01-21 VITALS
DIASTOLIC BLOOD PRESSURE: 72 MMHG | TEMPERATURE: 98.2 F | BODY MASS INDEX: 29.04 KG/M2 | HEART RATE: 77 BPM | SYSTOLIC BLOOD PRESSURE: 141 MMHG | OXYGEN SATURATION: 94 % | RESPIRATION RATE: 18 BRPM | WEIGHT: 170.1 LBS | HEIGHT: 64 IN

## 2019-01-21 PROBLEM — N30.01 ACUTE CYSTITIS WITH HEMATURIA: Status: RESOLVED | Noted: 2019-01-19 | Resolved: 2019-01-21

## 2019-01-21 LAB
ALBUMIN SERPL BCP-MCNC: 3.5 G/DL (ref 3.5–5.7)
ALP SERPL-CCNC: 92 U/L (ref 55–165)
ALT SERPL W P-5'-P-CCNC: 10 U/L (ref 7–52)
ANION GAP SERPL CALCULATED.3IONS-SCNC: 7 MMOL/L (ref 4–13)
AST SERPL W P-5'-P-CCNC: 12 U/L (ref 13–39)
BACTERIA UR CULT: ABNORMAL
BASOPHILS # BLD AUTO: 0 THOUSANDS/ΜL (ref 0–0.1)
BASOPHILS NFR BLD AUTO: 1 % (ref 0–2)
BILIRUB SERPL-MCNC: 0.4 MG/DL (ref 0.2–1)
BUN SERPL-MCNC: 9 MG/DL (ref 7–25)
CALCIUM SERPL-MCNC: 9.1 MG/DL (ref 8.6–10.5)
CHLORIDE SERPL-SCNC: 103 MMOL/L (ref 98–107)
CO2 SERPL-SCNC: 28 MMOL/L (ref 21–31)
CREAT SERPL-MCNC: 0.64 MG/DL (ref 0.6–1.2)
EOSINOPHIL # BLD AUTO: 0.1 THOUSAND/ΜL (ref 0–0.61)
EOSINOPHIL NFR BLD AUTO: 2 % (ref 0–5)
ERYTHROCYTE [DISTWIDTH] IN BLOOD BY AUTOMATED COUNT: 13.4 % (ref 11.5–14.5)
GFR SERPL CREATININE-BSD FRML MDRD: 76 ML/MIN/1.73SQ M
GLUCOSE SERPL-MCNC: 90 MG/DL (ref 65–99)
HCT VFR BLD AUTO: 44.9 % (ref 34.8–46.1)
HGB BLD-MCNC: 15.1 G/DL (ref 12–16)
LYMPHOCYTES # BLD AUTO: 0.8 THOUSANDS/ΜL (ref 0.6–4.47)
LYMPHOCYTES NFR BLD AUTO: 16 % (ref 21–51)
MCH RBC QN AUTO: 31.1 PG (ref 26–34)
MCHC RBC AUTO-ENTMCNC: 33.7 G/DL (ref 31–37)
MCV RBC AUTO: 92 FL (ref 81–99)
MONOCYTES # BLD AUTO: 0.6 THOUSAND/ΜL (ref 0.17–1.22)
MONOCYTES NFR BLD AUTO: 12 % (ref 2–12)
NEUTROPHILS # BLD AUTO: 3.6 THOUSANDS/ΜL (ref 1.4–6.5)
NEUTS SEG NFR BLD AUTO: 69 % (ref 42–75)
NRBC BLD AUTO-RTO: 0 /100 WBCS
PLATELET # BLD AUTO: 183 THOUSANDS/UL (ref 149–390)
PMV BLD AUTO: 9 FL (ref 8.6–11.7)
POTASSIUM SERPL-SCNC: 3.5 MMOL/L (ref 3.5–5.5)
PROT SERPL-MCNC: 6.1 G/DL (ref 6.4–8.9)
RBC # BLD AUTO: 4.87 MILLION/UL (ref 3.9–5.2)
SODIUM SERPL-SCNC: 138 MMOL/L (ref 134–143)
WBC # BLD AUTO: 5.2 THOUSAND/UL (ref 4.8–10.8)

## 2019-01-21 PROCEDURE — 85025 COMPLETE CBC W/AUTO DIFF WBC: CPT | Performed by: HOSPITALIST

## 2019-01-21 PROCEDURE — 97530 THERAPEUTIC ACTIVITIES: CPT

## 2019-01-21 PROCEDURE — 97535 SELF CARE MNGMENT TRAINING: CPT

## 2019-01-21 PROCEDURE — 80053 COMPREHEN METABOLIC PANEL: CPT | Performed by: PHYSICIAN ASSISTANT

## 2019-01-21 PROCEDURE — 99239 HOSP IP/OBS DSCHRG MGMT >30: CPT | Performed by: INTERNAL MEDICINE

## 2019-01-21 RX ORDER — NITROFURANTOIN 25; 75 MG/1; MG/1
100 CAPSULE ORAL 2 TIMES DAILY
Qty: 10 CAPSULE | Refills: 0 | Status: SHIPPED | OUTPATIENT
Start: 2019-01-21 | End: 2019-01-30

## 2019-01-21 RX ADMIN — ASPIRIN 81 MG 81 MG: 81 TABLET ORAL at 08:13

## 2019-01-21 RX ADMIN — CEFTRIAXONE 1000 MG: 1 INJECTION, SOLUTION INTRAVENOUS at 02:41

## 2019-01-21 RX ADMIN — OMEGA-3 FATTY ACIDS CAP 1000 MG 1000 MG: 1000 CAP at 08:13

## 2019-01-21 RX ADMIN — FUROSEMIDE 40 MG: 40 TABLET ORAL at 08:14

## 2019-01-21 RX ADMIN — HEPARIN SODIUM 5000 UNITS: 5000 INJECTION INTRAVENOUS; SUBCUTANEOUS at 06:35

## 2019-01-21 RX ADMIN — DOCUSATE SODIUM 100 MG: 100 CAPSULE, LIQUID FILLED ORAL at 08:13

## 2019-01-21 RX ADMIN — Medication 1 TABLET: at 08:14

## 2019-01-21 NOTE — PLAN OF CARE
Problem: OCCUPATIONAL THERAPY ADULT  Goal: Performs self-care activities at highest level of function for planned discharge setting  See evaluation for individualized goals  Outcome: Progressing  Limitation: Decreased ADL status, Decreased cognition  Prognosis: Fair  Assessment: Patient participated in Skilled OT session this date with interventions consisting of ADL re training with the use of correct body mechnaics,  therapeutic activities to: increase activity tolerance and increase trunk control   Patient agreeable to OT treatment session, upon arrival patient was found seated in bed  Patient had interest in washing and dressing in preparation for return to residence  (See flow sheet for details )  Patient requiring verbal cues for correct technique, one step directives and frequent rest periods  Patient continues to be functioning below baseline level, occupational performance remains limited secondary to factors listed above and increased risk for falls and injury  Patient to benefit from continued Occupational Therapy treatment while in the hospital to address deficits as defined above and maximize level of functional independence with ADLs and functional mobility  Note: discharge is planned       LOYDA Christine/WILMAN

## 2019-01-21 NOTE — SOCIAL WORK
Discussed the POC with the rounding team    Pt ahs been evaluatd by MD and is stable to be discharged back to [facility]  PT will be transported by [facility] van at 1:30  Pt states her son is aware of discharge and is picking up her perscriptions  Left a message with the DIL of transport time  Family was made aware earlier of there being a cost for transport and is agreeable  Left a message with Merna Pallas at [facility] of transport time

## 2019-01-21 NOTE — PHYSICAL THERAPY NOTE
01/21/19 1000   Pain Assessment   Pain Assessment 0-10   Pain Score No Pain   Restrictions/Precautions   Weight Bearing Precautions Per Order No   Other Precautions Cognitive; Fall Risk   General   Family/Caregiver Present No   Cognition   Overall Cognitive Status Impaired   Arousal/Participation Arousable; Cooperative   Attention Attends with cues to redirect   Orientation Level Oriented to person   Memory Decreased short term memory;Decreased recall of recent events   Following Commands Follows one step commands inconsistently   Comments pt agreed to PT session & reports need to "use potty'   Subjective   Subjective "I have to use the potty "   Bed Mobility   Rolling R 3  Moderate assistance   Additional items Assist x 1;Bedrails;Verbal cues   Rolling L 3  Moderate assistance   Additional items Assist x 1;Bedrails;Verbal cues   Additional Comments rolling with mod x 1 & mod a x 1 to hold in SL for bedpan placement & pericare   Endurance Deficit   Endurance Deficit Yes   Endurance Deficit Description fatigued with min activity   Activity Tolerance   Activity Tolerance Patient limited by fatigue   Assessment   Prognosis Fair   Problem List Decreased strength;Decreased endurance; Impaired balance;Decreased mobility; Decreased cognition; Impaired judgement;Decreased safety awareness; Impaired vision; Impaired hearing   Assessment Pt seen for PT treatment session this date with interventions consisting of therapeutic activity consisting of training: bed mobility  Pt agreeable to PT treatment session upon arrival, pt found supine in bed w/ HOB elevated, in no apparent distress  In comparison to previous session, pt with improvements in bed mobility tolerance  Post session: all needs in reach pt in bed, SCDs active  Continue to recommend STR at time of d/c in order to maximize pt's functional independence and safety w/ mobility   Pt continues to be functioning below baseline level, and remains limited 2* factors listed above and including decreased strength, endurance & safe functional mobility  PT will continue to see pt while here in order to address the deficits listed above and provide interventions consistent w/ POC in effort to achieve STGs  Goals   [facility]   Treatment Day 2   Plan   Treatment/Interventions Functional transfer training;LE strengthening/ROM; Therapeutic exercise; Endurance training;Patient/family training;Bed mobility   Progress Slow progress, decreased activity tolerance   PT Frequency 5x/wk   Recommendation   Recommendation Short-term skilled PT   Equipment Recommended Walker; Wheelchair   PT - OK to Discharge Yes   Additional Comments pt in bed all needs in reach, SCDs active post session   Johanna Long, PTA

## 2019-01-21 NOTE — DISCHARGE SUMMARY
Discharge- Avani Sands 6/15/1923, 80 y o  female MRN: [id number]    Unit/Bed#: -02 Encounter: [id number]    Primary Care Provider: Monroe Youssef MD   Date and time admitted to hospital: 1/18/2019 10:39 PM        Lightheadedness   Assessment & Plan    · Resolved  · Most likely secondary to the infection     PVD (peripheral vascular disease) ([address] )   Assessment & Plan    · Continue pre-hospital aspirin 81 mg p o  Daily     Hyperlipidemia   Assessment & Plan    · Continue pravastatin     * Acute cystitis with hematuriaresolved as of 1/21/2019   Assessment & Plan    · Patient has an E coli UTI  · We will discharge on course of Macrobid  · Symptoms resolved patient is returned to baseline         Discharging Physician / Practitioner: Reynaldo Camarillo MD  PCP: Monroe Youssef MD  Admission Date:   Admission Orders     Ordered        01/19/19 0218  Inpatient Admission (expected length of stay for this patient is greater than two midnights)  Once             Discharge Date: 01/21/19    Resolved Problems  Date Reviewed: 1/21/2019          Resolved    * (Principal)Acute cystitis with hematuria 1/21/2019     Resolved by  Reynaldo Camarillo MD          Consultations During Hospital Stay:  · none    Procedures Performed:   · none    Significant Findings / Test Results:   · none    Incidental Findings:   · none     Test Results Pending at Discharge (will require follow up):   · none     Outpatient Tests Requested:  · none    Complications:  none    Reason for Admission: lightheadedness    Hospital Course:     Avani Sands is a 80 y o  female patient who originally presented to the hospital on 1/18/2019 due to lightheadedness and was found to have urinary tract infection  Patient was treated empirically with ceftriaxone with complete resolution of her symptoms  Cultures demonstrated E coli  Patient returned to her baseline  Patient will be discharged on a course of Macrobid for her UTI    Plan is to discharge patient back to her assisted living facility today  Case discussed with the patient's son was at bedside  Please see above list of diagnoses and related plan for additional information  Condition at Discharge: good     Discharge Day Visit / Exam:     Subjective:  Patient was seen examined  No acute events overnight  Vitals: Blood Pressure: 141/72 (01/21/19 0719)  Pulse: 77 (01/21/19 0719)  Temperature: 98 2 °F (36 8 °C) (01/21/19 0719)  Temp Source: Temporal (01/21/19 0719)  Respirations: 18 (01/21/19 0719)  Height: 5' 4" (162 6 cm) (01/19/19 0320)  Weight - Scale: 77 2 kg (170 lb 1 6 oz) (01/21/19 0600)  SpO2: 94 % (01/21/19 0719)  Exam:   Physical Exam   Constitutional: She is oriented to person, place, and time  She appears well-developed and well-nourished  No distress  HENT:   Head: Normocephalic and atraumatic  Eyes: Pupils are equal, round, and reactive to light  EOM are normal    Neck: Normal range of motion  Neck supple  No JVD present  Cardiovascular: Normal rate, regular rhythm and normal heart sounds  Pulmonary/Chest: Effort normal and breath sounds normal  No respiratory distress  Abdominal: Soft  Bowel sounds are normal  She exhibits no distension  Obese   Musculoskeletal: Normal range of motion  She exhibits no edema  Neurological: She is alert and oriented to person, place, and time  Skin: Skin is warm  No rash noted  No erythema  Psychiatric: She has a normal mood and affect  Her behavior is normal  Thought content normal    Nursing note and vitals reviewed  Discussion with Family: son at bedside    Discharge instructions/Information to patient and family:   See after visit summary for information provided to patient and family  Provisions for Follow-Up Care:  See after visit summary for information related to follow-up care and any pertinent home health orders        Disposition:     Home    For Discharges to [facility] SNF:   · Not Applicable to this Patient - Not Applicable to this Patient    Planned Readmission: n/a     Discharge Statement:  I spent 35 minutes discharging the patient  This time was spent on the day of discharge  I had direct contact with the patient on the day of discharge  Greater than 50% of the total time was spent examining patient, answering all patient questions, arranging and discussing plan of care with patient as well as directly providing post-discharge instructions  Additional time then spent on discharge activities  Discharge Medications:  See after visit summary for reconciled discharge medications provided to patient and family        ** Please Note: This note has been constructed using a voice recognition system **

## 2019-01-21 NOTE — ASSESSMENT & PLAN NOTE
· Patient has an E coli UTI  · We will discharge on course of Macrobid  · Symptoms resolved patient is returned to baseline

## 2019-01-21 NOTE — OCCUPATIONAL THERAPY NOTE
01/21/19 1245   Restrictions/Precautions   Weight Bearing Precautions Per Order No   Other Precautions Cognitive; Fall Risk;Hard of hearing;Visual impairment   Pain Assessment   Pain Assessment No/denies pain   ADL   UB Bathing Assistance 4  Minimal Assistance   UB Bathing Deficit Setup;Verbal cueing; Increased time to complete   LB Bathing Assistance Unable to assess   LB Bathing Comments patient deferred - allowed therapist to wipe lower legs and feet   UB Dressing Assistance 3  Moderate Assistance   UB Dressing Deficit Setup;Verbal cueing; Increased time to complete; Thread LUE; Fasteners  (required assist  with all aspects of bra donning)   LB Dressing Assistance 2  Maximal Assistance   Bed Mobility   Rolling R 3  Moderate assistance   Additional items Assist x 1;Bedrails;Verbal cues   Supine to Sit 3  Moderate assistance   Additional items Assist x 1;HOB elevated; Bedrails; Increased time required;Verbal cues   Sit to Supine 3  Moderate assistance   Additional items Assist x 1;Bedrails;Verbal cues;LE management   Additional Comments for bed pan use/CNA followed up  (Also - patient unable to maintain sit at EOB with activity)   Coordination   Gross Motor WFL   Cognition   Overall Cognitive Status Impaired   Arousal/Participation Responsive; Cooperative   Activity Tolerance   Activity Tolerance Patient limited by fatigue   Assessment   Assessment Patient participated in Skilled OT session this date with interventions consisting of ADL re training with the use of correct body mechnaics,  therapeutic activities to: increase activity tolerance and increase trunk control   Patient agreeable to OT treatment session, upon arrival patient was found seated in bed  Patient had interest in washing and dressing in preparation for return to residence  (See flow sheet for details )  Patient requiring verbal cues for correct technique, one step directives and frequent rest periods   Patient continues to be functioning below baseline level, occupational performance remains limited secondary to factors listed above and increased risk for falls and injury  Patient to benefit from continued Occupational Therapy treatment while in the hospital to address deficits as defined above and maximize level of functional independence with ADLs and functional mobility  Note: discharge is planned  Plan   Treatment Interventions ADL retraining;Functional transfer training; Activityengagement   Goal Expiration Date 01/29/19   Treatment Day 172 LOYDA Matthews/L

## 2019-01-21 NOTE — PLAN OF CARE
Problem: PHYSICAL THERAPY ADULT  Goal: Performs mobility at highest level of function for planned discharge setting  See evaluation for individualized goals  Treatment/Interventions: Functional transfer training, LE strengthening/ROM, Therapeutic exercise, Endurance training, Bed mobility  Equipment Recommended: Wheelchair, Aneesh Ware       See flowsheet documentation for full assessment, interventions and recommendations  Outcome: Progressing  Prognosis: Fair  Problem List: Decreased strength, Decreased endurance, Impaired balance, Decreased mobility, Decreased cognition, Impaired judgement, Decreased safety awareness, Impaired vision, Impaired hearing  Assessment: Pt seen for PT treatment session this date with interventions consisting of therapeutic activity consisting of training: bed mobility  Pt agreeable to PT treatment session upon arrival, pt found supine in bed w/ HOB elevated, in no apparent distress  In comparison to previous session, pt with improvements in bed mobility tolerance  Post session: all needs in reach pt in bed, SCDs active  Continue to recommend STR at time of d/c in order to maximize pt's functional independence and safety w/ mobility  Pt continues to be functioning below baseline level, and remains limited 2* factors listed above and including decreased strength, endurance & safe functional mobility  PT will continue to see pt while here in order to address the deficits listed above and provide interventions consistent w/ POC in effort to achieve STGs  Recommendation: Short-term skilled PT     PT - OK to Discharge: Yes    See flowsheet documentation for full assessment     Tram Gudino, PTA

## 2019-01-22 ENCOUNTER — TELEPHONE (OUTPATIENT)
Dept: FAMILY MEDICINE CLINIC | Facility: CLINIC | Age: 84
End: 2019-01-22

## 2019-01-22 DIAGNOSIS — R05.9 COUGH: ICD-10-CM

## 2019-01-22 DIAGNOSIS — R06.2 WHEEZING: Primary | ICD-10-CM

## 2019-01-22 DIAGNOSIS — R05.9 COUGH: Primary | ICD-10-CM

## 2019-01-22 RX ORDER — AZITHROMYCIN 250 MG/1
TABLET, FILM COATED ORAL
Qty: 6 TABLET | Refills: 0 | Status: SHIPPED | OUTPATIENT
Start: 2019-01-22 | End: 2019-01-22 | Stop reason: SDUPTHER

## 2019-01-22 RX ORDER — AZITHROMYCIN 250 MG/1
TABLET, FILM COATED ORAL
Qty: 6 TABLET | Refills: 0 | Status: SHIPPED | OUTPATIENT
Start: 2019-01-22 | End: 2019-01-27

## 2019-01-22 NOTE — TELEPHONE ENCOUNTER
LETICIA  [facility] called in regards to patient  States she is experiencing wheezing and productive cough  I placed an order for a portable CXR   Faxed to [phone]

## 2019-01-23 ENCOUNTER — TRANSITIONAL CARE MANAGEMENT (OUTPATIENT)
Dept: FAMILY MEDICINE CLINIC | Facility: CLINIC | Age: 84
End: 2019-01-23

## 2019-01-24 LAB
BACTERIA BLD CULT: NORMAL
BACTERIA BLD CULT: NORMAL

## 2019-01-25 ENCOUNTER — TELEPHONE (OUTPATIENT)
Dept: FAMILY MEDICINE CLINIC | Facility: CLINIC | Age: 84
End: 2019-01-25

## 2019-01-25 NOTE — LETTER
January 28, 2019         Patient: Jaziel Naylor   YOB: 1923   Date of Visit: 1/25/2019       To Whom it Concerns:    My patient Michelle Gonsalez is to have her Fish Oil capsules discontinued  If you have questions, please do not hesitate to call me  I look forward to following your patient along with you           Sincerely,        Lakesha Sosaly with Dr Yahaira Andrade        CC:     Yahaira Andrade MD  1/28/2019  9:10 AM  Signed  yes

## 2019-01-25 NOTE — TELEPHONE ENCOUNTER
Patients son called, states they were advised to discontinue the fish oil she was taking  Can we send [facility] letter to the village stating she is to have it discontinued so they dont give it to her on a daily basis?

## 2019-01-28 ENCOUNTER — TELEPHONE (OUTPATIENT)
Dept: FAMILY MEDICINE CLINIC | Facility: CLINIC | Age: 84
End: 2019-01-28

## 2019-01-28 NOTE — TELEPHONE ENCOUNTER
Patient's son came in asking for us to fax an order to [facility] stating that her Omega three was DC' D  States it was DC' D previously in the office and they started her back on it when she was in the hospital  Is this ok to write?

## 2019-01-30 ENCOUNTER — OFFICE VISIT (OUTPATIENT)
Dept: FAMILY MEDICINE CLINIC | Facility: CLINIC | Age: 84
End: 2019-01-30
Payer: MEDICARE

## 2019-01-30 VITALS
TEMPERATURE: 96.2 F | RESPIRATION RATE: 16 BRPM | OXYGEN SATURATION: 99 % | HEIGHT: 64 IN | HEART RATE: 78 BPM | BODY MASS INDEX: 29.2 KG/M2

## 2019-01-30 DIAGNOSIS — N30.00 ACUTE CYSTITIS WITHOUT HEMATURIA: ICD-10-CM

## 2019-01-30 DIAGNOSIS — R26.2 AMBULATORY DYSFUNCTION: ICD-10-CM

## 2019-01-30 DIAGNOSIS — R42 LIGHTHEADEDNESS: Primary | ICD-10-CM

## 2019-01-30 PROCEDURE — 99495 TRANSJ CARE MGMT MOD F2F 14D: CPT | Performed by: INTERNAL MEDICINE

## 2019-01-30 RX ORDER — TRIAMCINOLONE ACETONIDE 1 MG/G
CREAM TOPICAL 2 TIMES DAILY
COMMUNITY
End: 2019-02-08 | Stop reason: SDUPTHER

## 2019-01-30 RX ORDER — FLUOCINOLONE ACETONIDE 0.11 MG/ML
OIL TOPICAL
Status: ON HOLD | COMMUNITY
End: 2019-12-28

## 2019-01-30 NOTE — PROGRESS NOTES
Assessment/Plan:  Acute cystitis associated with confusion improved with treatment  2   History of peripheral vascular disease stable 3  History of chronic lower extremity edema 1 to 2+  3   Advanced age  Patient medically stable otherwise  Hospital records were reviewed in detail and discussed her CBC Chem profile lipids are all satisfactory recheck  3 months         Diagnoses and all orders for this visit:    Lightheadedness  -     Ambulatory referral to Physical Therapy; Future    Acute cystitis without hematuria    Ambulatory dysfunction  -     Ambulatory referral to Physical Therapy; Future    Other orders  -     Fluocinolone Acetonide Body 0 01 % OIL; Apply topically  -     triamcinolone (KENALOG) 0 1 % cream; Apply topically 2 (two) times a day          Subjective:      Patient ID: Ely Deluna is a 80 y o  female  59-year-old female comes for post hospital visit  Patient was recently hospitalized with symptoms of confusion and dizziness associated urinary tract infection  Patient was treated and discharged after 48 hr  She was then referred for physical therapy patient is improving she has no complaints at this time  Patient is fully alert oriented at this time  The following portions of the patient's history were reviewed and updated as appropriate: She  has a past medical history of Blind left eye; Edema; Fx of fibula; Hypercholesterolemia; Hypertension; and Hypotension  Patient Active Problem List    Diagnosis Date Noted    Lightheadedness 01/19/2019    Hyperlipidemia 07/30/2018    PVD (peripheral vascular disease) ([facility] [address] ) 07/30/2018     She  has a past surgical history that includes Hysterectomy; Thyroid surgery; and Eye surgery  Her family history is not on file  She  reports that she has never smoked  She has never used smokeless tobacco  She reports that she does not drink alcohol or use drugs    Current Outpatient Prescriptions   Medication Sig Dispense Refill    Acetaminophen (APAP) 325 MG tablet Take 650 mg by mouth every 4 (four) hours as needed for mild pain      aspirin 81 MG tablet Take 81 mg by mouth daily      Coenzyme Q10 (CO Q 10) 100 MG CAPS Take by mouth      Difluprednate (DUREZOL) 0 05 % EMUL Apply to eye Install 1 drop into right eye every morning      docusate sodium (COLACE) 100 mg capsule Take 100 mg by mouth 2 (two) times a day      Fluocinolone Acetonide Body 0 01 % OIL Apply topically      furosemide (LASIX) 40 mg tablet Take 40 mg by mouth daily      latanoprost (XALATAN) 0 005 % ophthalmic solution       Multiple Vitamin (MULTIVITAMINS PO) Take by mouth daily      simvastatin (ZOCOR) 20 mg tablet Take 20 mg by mouth daily      sodium chloride (IRISH 128) 5 % hypertonic ophthalmic ointment 1 drop daily      triamcinolone (KENALOG) 0 1 % cream Apply topically 2 (two) times a day       No current facility-administered medications for this visit  Current Outpatient Prescriptions on File Prior to Visit   Medication Sig    Acetaminophen (APAP) 325 MG tablet Take 650 mg by mouth every 4 (four) hours as needed for mild pain    aspirin 81 MG tablet Take 81 mg by mouth daily    Coenzyme Q10 (CO Q 10) 100 MG CAPS Take by mouth    Difluprednate (DUREZOL) 0 05 % EMUL Apply to eye Install 1 drop into right eye every morning    docusate sodium (COLACE) 100 mg capsule Take 100 mg by mouth 2 (two) times a day    furosemide (LASIX) 40 mg tablet Take 40 mg by mouth daily    latanoprost (XALATAN) 0 005 % ophthalmic solution     Multiple Vitamin (MULTIVITAMINS PO) Take by mouth daily    simvastatin (ZOCOR) 20 mg tablet Take 20 mg by mouth daily    sodium chloride (IRISH 128) 5 % hypertonic ophthalmic ointment 1 drop daily    [DISCONTINUED] nitrofurantoin (MACROBID) 100 mg capsule Take 1 capsule (100 mg total) by mouth 2 (two) times a day     No current facility-administered medications on file prior to visit        She has No Known Allergies       Review of Systems   Constitutional: Negative  HENT: Negative  Eyes: Negative  Respiratory: Negative  Cardiovascular: Negative  Gastrointestinal: Negative  Endocrine: Negative  Genitourinary: Negative  Musculoskeletal: Positive for gait problem  Skin: Negative  Allergic/Immunologic: Negative  Neurological: Positive for light-headedness  Hematological: Negative  Psychiatric/Behavioral: Negative  Objective:      Pulse 78   Temp (!) 96 2 °F (35 7 °C) (Tympanic)   Resp 16   Ht 5' 4" (1 626 m)   LMP  (LMP Unknown)   SpO2 99%   BMI 29 20 kg/m²          Physical Exam   Constitutional: She is oriented to person, place, and time  She appears well-developed and well-nourished  HENT:   Head: Normocephalic and atraumatic  Eyes: Pupils are equal, round, and reactive to light  Neck: Normal range of motion  Neck supple  No JVD present  No thyromegaly present  Cardiovascular: Normal rate, regular rhythm and normal heart sounds  Exam reveals no friction rub  No murmur heard  Pulmonary/Chest: Effort normal and breath sounds normal  No respiratory distress  She has no wheezes  She has no rales  Abdominal: Soft  Bowel sounds are normal  She exhibits no mass  There is no tenderness  There is no rebound  Musculoskeletal: Normal range of motion  She exhibits edema  She exhibits no tenderness  Lymphadenopathy:     She has no cervical adenopathy  Neurological: She is alert and oriented to person, place, and time  She has normal reflexes  Skin: Skin is warm and dry  Psychiatric: She has a normal mood and affect           Admission on 01/18/2019, Discharged on 01/21/2019   Component Date Value Ref Range Status    Protime 01/18/2019 12 8  10 2 - 13 0 seconds Final         INR 01/18/2019 1 10  0 90 - 1 50 Final         PTT 01/18/2019 34  26 - 38 seconds Final    Therapeutic Heparin Range =  60-90 seconds    WBC 01/18/2019 11 10* 4 80 - 10 80 Thousand/uL Final    RBC 01/18/2019 5 02  3 90 - 5 20 Million/uL Final    Hemoglobin 01/18/2019 15 1  12 0 - 16 0 g/dL Final    Hematocrit 01/18/2019 46 4* 34 8 - 46 1 % Final    MCV 01/18/2019 93  81 - 99 fL Final    MCH 01/18/2019 30 0  26 0 - 34 0 pg Final    MCHC 01/18/2019 32 5  31 0 - 37 0 g/dL Final    RDW 01/18/2019 13 4  11 5 - 14 5 % Final    MPV 01/18/2019 8 8  8 6 - 11 7 fL Final    Platelets 01/26/1922 214  149 - 390 Thousands/uL Final    nRBC 01/18/2019 0  /100 WBCs Final    Neutrophils Relative 01/18/2019 82* 42 - 75 % Final    Lymphocytes Relative 01/18/2019 8* 21 - 51 % Final    Monocytes Relative 01/18/2019 10  2 - 12 % Final    Eosinophils Relative 01/18/2019 0  0 - 5 % Final    Basophils Relative 01/18/2019 1  0 - 2 % Final    Neutrophils Absolute 01/18/2019 9 20* 1 40 - 6 50 Thousands/µL Final    Lymphocytes Absolute 01/18/2019 0 80  0 60 - 4 47 Thousands/µL Final    Monocytes Absolute 01/18/2019 1 10  0 17 - 1 22 Thousand/µL Final    Eosinophils Absolute 01/18/2019 0 00  0 00 - 0 61 Thousand/µL Final    Basophils Absolute 01/18/2019 0 10  0 00 - 0 10 Thousands/µL Final    Sodium 01/18/2019 135  134 - 143 mmol/L Final    Potassium 01/18/2019 4 2  3 5 - 5 5 mmol/L Final    Chloride 01/18/2019 100  98 - 107 mmol/L Final    CO2 01/18/2019 31  21 - 31 mmol/L Final    ANION GAP 01/18/2019 4  4 - 13 mmol/L Final    BUN 01/18/2019 17  7 - 25 mg/dL Final    Creatinine 01/18/2019 0 85  0 60 - 1 20 mg/dL Final    Standardized to IDMS reference method    Glucose 01/18/2019 116* 65 - 99 mg/dL Final      If the patient is fasting, the ADA then defines impaired fasting glucose as > 100 mg/dL and diabetes as > or equal to 123 mg/dL  Specimen collection should occur prior to Sulfasalazine administration due to the potential for falsely depressed results  Specimen collection should occur prior to Sulfapyridine administration due to the potential for falsely elevated results      Calcium 01/18/2019 9 5  8 6 - 10 5 mg/dL Final    AST 01/18/2019 14  13 - 39 U/L Final      Specimen collection should occur prior to Sulfasalazine administration due to the potential for falsely depressed results   ALT 01/18/2019 9  7 - 52 U/L Final      Specimen collection should occur prior to Sulfasalazine administration due to the potential for falsely depressed results       Alkaline Phosphatase 01/18/2019 98  55 - 165 U/L Final    Total Protein 01/18/2019 6 6  6 4 - 8 9 g/dL Final    Albumin 01/18/2019 3 9  3 5 - 5 7 g/dL Final    Total Bilirubin 01/18/2019 0 50  0 20 - 1 00 mg/dL Final    eGFR 01/18/2019 58  ml/min/1 73sq m Final    BNP 01/18/2019 58  1 - 100 pg/mL Final    Troponin I 01/18/2019 <0 03  <=0 03 ng/mL Final    Total CK 01/18/2019 18* 30 - 192 U/L Final    Color, UA 01/19/2019 Yellow  Yellow, Straw Final    Clarity, UA 01/19/2019 Turbid* Hazy, Clear Final    Specific Gravity, UA 01/19/2019 1 020  >1 005-<1 030 Final    pH, UA 01/19/2019 6 5  5 0-<8 0 Final    Leukocytes, UA 01/19/2019 3+* Negative Final    Nitrite, UA 01/19/2019 Positive* Negative Final    Protein, UA 01/19/2019 Negative  Negative, Interference- unable to analyze mg/dl Final    Glucose, UA 01/19/2019 Negative  Negative mg/dl Final    Ketones, UA 01/19/2019 Negative  Negative mg/dl Final    Urobilinogen, UA 01/19/2019 0 2  0 2, 1 0 E U /dl E U /dl Final    Bilirubin, UA 01/19/2019 Negative  Negative Final    Blood, UA 01/19/2019 1+* Negative Final    POC Glucose 01/18/2019 91  65 - 140 mg/dl Final    Troponin I 01/19/2019 <0 03  <=0 03 ng/mL Final    RBC, UA 01/19/2019 10-20* None Seen, 0-5 /hpf Final    WBC, UA 01/19/2019 Innumerable* None Seen, 0-5, 5-55, 5-65 /hpf Final    Epithelial Cells 01/19/2019 Occasional  None Seen, Occasional /hpf Final    Bacteria, UA 01/19/2019 Innumerable* None Seen, Occasional /hpf Final    MUCUS THREADS 01/19/2019 Moderate* None Seen Final    Urine Culture 01/19/2019 >100,000 cfu/ml Escherichia coli*  Final    LACTIC ACID 01/19/2019 1 0  0 5 - 2 0 mmol/L Final    Blood Culture 01/19/2019 No Growth After 5 Days  Final    Blood Culture 01/19/2019 No Growth After 5 Days  Final    Sodium 01/21/2019 138  134 - 143 mmol/L Final    Potassium 01/21/2019 3 5  3 5 - 5 5 mmol/L Final    Chloride 01/21/2019 103  98 - 107 mmol/L Final    CO2 01/21/2019 28  21 - 31 mmol/L Final    ANION GAP 01/21/2019 7  4 - 13 mmol/L Final    BUN 01/21/2019 9  7 - 25 mg/dL Final    Creatinine 01/21/2019 0 64  0 60 - 1 20 mg/dL Final    Standardized to IDMS reference method    Glucose 01/21/2019 90  65 - 99 mg/dL Final      If the patient is fasting, the ADA then defines impaired fasting glucose as > 100 mg/dL and diabetes as > or equal to 123 mg/dL  Specimen collection should occur prior to Sulfasalazine administration due to the potential for falsely depressed results  Specimen collection should occur prior to Sulfapyridine administration due to the potential for falsely elevated results   Calcium 01/21/2019 9 1  8 6 - 10 5 mg/dL Final    AST 01/21/2019 12* 13 - 39 U/L Final      Specimen collection should occur prior to Sulfasalazine administration due to the potential for falsely depressed results   ALT 01/21/2019 10  7 - 52 U/L Final      Specimen collection should occur prior to Sulfasalazine administration due to the potential for falsely depressed results       Alkaline Phosphatase 01/21/2019 92  55 - 165 U/L Final    Total Protein 01/21/2019 6 1* 6 4 - 8 9 g/dL Final    Albumin 01/21/2019 3 5  3 5 - 5 7 g/dL Final    Total Bilirubin 01/21/2019 0 40  0 20 - 1 00 mg/dL Final    eGFR 01/21/2019 76  ml/min/1 73sq m Final    WBC 01/19/2019 7 50  4 80 - 10 80 Thousand/uL Final    RBC 01/19/2019 4 69  3 90 - 5 20 Million/uL Final    Hemoglobin 01/19/2019 14 4  12 0 - 16 0 g/dL Final    Hematocrit 01/19/2019 43 7  34 8 - 46 1 % Final    MCV 01/19/2019 93  81 - 99 fL Final    MCH 01/19/2019 30 6  26 0 - 34 0 pg Final    MCHC 01/19/2019 32 9  31 0 - 37 0 g/dL Final    RDW 01/19/2019 13 3  11 5 - 14 5 % Final    MPV 01/19/2019 9 1  8 6 - 11 7 fL Final    Platelets 71/37/8592 182  149 - 390 Thousands/uL Final    nRBC 01/19/2019 0  /100 WBCs Final    Neutrophils Relative 01/19/2019 74  42 - 75 % Final    Lymphocytes Relative 01/19/2019 13* 21 - 51 % Final    Monocytes Relative 01/19/2019 12  2 - 12 % Final    Eosinophils Relative 01/19/2019 0  0 - 5 % Final    Basophils Relative 01/19/2019 1  0 - 2 % Final    Neutrophils Absolute 01/19/2019 5 50  1 40 - 6 50 Thousands/µL Final    Lymphocytes Absolute 01/19/2019 1 00  0 60 - 4 47 Thousands/µL Final    Monocytes Absolute 01/19/2019 0 90  0 17 - 1 22 Thousand/µL Final    Eosinophils Absolute 01/19/2019 0 00  0 00 - 0 61 Thousand/µL Final    Basophils Absolute 01/19/2019 0 00  0 00 - 0 10 Thousands/µL Final    Ventricular Rate 01/18/2019 87  BPM Final    Atrial Rate 01/18/2019 87  BPM Final    LA Interval 01/18/2019 134  ms Final    QRSD Interval 01/18/2019 138  ms Final    QT Interval 01/18/2019 376  ms Final    QTC Interval 01/18/2019 452  ms Final    P Axis 01/18/2019 56  degrees Final    QRS Axis 01/18/2019 -40  degrees Final    T Wave [place] 01/18/2019 8  degrees Final    Sodium 01/20/2019 140  134 - 143 mmol/L Final    Potassium 01/20/2019 3 6  3 5 - 5 5 mmol/L Final    Chloride 01/20/2019 106  98 - 107 mmol/L Final    CO2 01/20/2019 27  21 - 31 mmol/L Final    ANION GAP 01/20/2019 7  4 - 13 mmol/L Final    BUN 01/20/2019 11  7 - 25 mg/dL Final    Creatinine 01/20/2019 0 64  0 60 - 1 20 mg/dL Final    Standardized to IDMS reference method    Glucose 01/20/2019 92  65 - 99 mg/dL Final      If the patient is fasting, the ADA then defines impaired fasting glucose as > 100 mg/dL and diabetes as > or equal to 123 mg/dL    Specimen collection should occur prior to Sulfasalazine administration due to the potential for falsely depressed results  Specimen collection should occur prior to Sulfapyridine administration due to the potential for falsely elevated results      Calcium 01/20/2019 8 6  8 6 - 10 5 mg/dL Final    eGFR 01/20/2019 76  ml/min/1 73sq m Final    WBC 01/20/2019 6 30  4 80 - 10 80 Thousand/uL Final    RBC 01/20/2019 4 52  3 90 - 5 20 Million/uL Final    Hemoglobin 01/20/2019 14 0  12 0 - 16 0 g/dL Final    Hematocrit 01/20/2019 41 5  34 8 - 46 1 % Final    MCV 01/20/2019 92  81 - 99 fL Final    MCH 01/20/2019 30 9  26 0 - 34 0 pg Final    MCHC 01/20/2019 33 7  31 0 - 37 0 g/dL Final    RDW 01/20/2019 13 7  11 5 - 14 5 % Final    MPV 01/20/2019 9 0  8 6 - 11 7 fL Final    Platelets 52/85/3807 169  149 - 390 Thousands/uL Final    nRBC 01/20/2019 0  /100 WBCs Final    Neutrophils Relative 01/20/2019 67  42 - 75 % Final    Lymphocytes Relative 01/20/2019 16* 21 - 51 % Final    Monocytes Relative 01/20/2019 16* 2 - 12 % Final    Eosinophils Relative 01/20/2019 1  0 - 5 % Final    Basophils Relative 01/20/2019 1  0 - 2 % Final    Neutrophils Absolute 01/20/2019 4 20  1 40 - 6 50 Thousands/µL Final    Lymphocytes Absolute 01/20/2019 1 00  0 60 - 4 47 Thousands/µL Final    Monocytes Absolute 01/20/2019 1 00  0 17 - 1 22 Thousand/µL Final    Eosinophils Absolute 01/20/2019 0 10  0 00 - 0 61 Thousand/µL Final    Basophils Absolute 01/20/2019 0 00  0 00 - 0 10 Thousands/µL Final    WBC 01/21/2019 5 20  4 80 - 10 80 Thousand/uL Final    RBC 01/21/2019 4 87  3 90 - 5 20 Million/uL Final    Hemoglobin 01/21/2019 15 1  12 0 - 16 0 g/dL Final    Hematocrit 01/21/2019 44 9  34 8 - 46 1 % Final    MCV 01/21/2019 92  81 - 99 fL Final    MCH 01/21/2019 31 1  26 0 - 34 0 pg Final    MCHC 01/21/2019 33 7  31 0 - 37 0 g/dL Final    RDW 01/21/2019 13 4  11 5 - 14 5 % Final    MPV 01/21/2019 9 0  8 6 - 11 7 fL Final    Platelets 73/88/1731 183  149 - 390 Thousands/uL Final    nRBC 01/21/2019 0 /100 WBCs Final    Neutrophils Relative 01/21/2019 69  42 - 75 % Final    Lymphocytes Relative 01/21/2019 16* 21 - 51 % Final    Monocytes Relative 01/21/2019 12  2 - 12 % Final    Eosinophils Relative 01/21/2019 2  0 - 5 % Final    Basophils Relative 01/21/2019 1  0 - 2 % Final    Neutrophils Absolute 01/21/2019 3 60  1 40 - 6 50 Thousands/µL Final    Lymphocytes Absolute 01/21/2019 0 80  0 60 - 4 47 Thousands/µL Final    Monocytes Absolute 01/21/2019 0 60  0 17 - 1 22 Thousand/µL Final    Eosinophils Absolute 01/21/2019 0 10  0 00 - 0 61 Thousand/µL Final    Basophils Absolute 01/21/2019 0 00  0 00 - 0 10 Thousands/µL Final   Admission on 01/03/2019, Discharged on 01/03/2019   Component Date Value Ref Range Status    WBC 01/03/2019 6 90  4 31 - 10 16 Thousand/uL Final    RBC 01/03/2019 4 92  3 81 - 5 12 Million/uL Final    Hemoglobin 01/03/2019 15 1  11 5 - 15 4 g/dL Final    Hematocrit 01/03/2019 45 4  37 0 - 47 0 % Final    MCV 01/03/2019 92  82 - 98 fL Final    MCH 01/03/2019 30 7  26 8 - 34 3 pg Final    MCHC 01/03/2019 33 3  31 4 - 37 4 g/dL Final    RDW 01/03/2019 13 3  11 6 - 15 1 % Final    MPV 01/03/2019 8 5* 8 9 - 12 7 fL Final    Platelets 78/53/5226 216  149 - 390 Thousands/uL Final    nRBC 01/03/2019 0  /100 WBCs Final    Neutrophils Relative 01/03/2019 68  43 - 75 % Final    Lymphocytes Relative 01/03/2019 21  14 - 44 % Final    Monocytes Relative 01/03/2019 9  4 - 12 % Final    Eosinophils Relative 01/03/2019 3  0 - 6 % Final    Basophils Relative 01/03/2019 1  0 - 1 % Final    Neutrophils Absolute 01/03/2019 4 70  1 85 - 7 62 Thousands/µL Final    Lymphocytes Absolute 01/03/2019 1 40  0 60 - 4 47 Thousands/µL Final    Monocytes Absolute 01/03/2019 0 60  0 17 - 1 22 Thousand/µL Final    Eosinophils Absolute 01/03/2019 0 20  0 00 - 0 61 Thousand/µL Final    Basophils Absolute 01/03/2019 0 00  0 00 - 0 10 Thousands/µL Final    Protime 01/03/2019 12 5  10 2 - 13 0 seconds Final         INR 01/03/2019 1 09  0 90 - 1 50 Final         PTT 01/03/2019 32  26 - 38 seconds Final    Therapeutic Heparin Range =  60-90 seconds       No results found

## 2019-02-08 DIAGNOSIS — L30.9 ECZEMA, UNSPECIFIED TYPE: Primary | ICD-10-CM

## 2019-02-08 RX ORDER — TRIAMCINOLONE ACETONIDE 1 MG/G
CREAM TOPICAL 2 TIMES DAILY
Qty: 30 G | Refills: 1 | Status: ON HOLD | OUTPATIENT
Start: 2019-02-08

## 2019-02-08 NOTE — LETTER
February 8, 2019     [address] [facility] [address]    Patient: Rocío Esteban   YOB: 1923   Date of Visit: 2/8/2019       To whomever it may concern:    Please discontinue patients Colace per my order           Thank you,        VALDEZ Girard

## 2019-02-08 NOTE — TELEPHONE ENCOUNTER
Called [facility] to verify what we should do to d/c Colace for pt  They asked if we could write it on letterhead and fax it over to them  Letter faxed to [phone]  Henrietta verbally aware

## 2019-02-08 NOTE — TELEPHONE ENCOUNTER
Pt's son, Lorri Holter, came into office asking for a script for his mother  Pt states she needs a refill of Kenalog cream  Also states her personal care home needs an order to discontinue her colace  Please advise  Thank you

## 2019-02-19 ENCOUNTER — TELEPHONE (OUTPATIENT)
Dept: FAMILY MEDICINE CLINIC | Facility: CLINIC | Age: 84
End: 2019-02-19

## 2019-02-22 NOTE — TELEPHONE ENCOUNTER
If blood pressure is within normal limits she can increase Lasix to 60mg daily, schedule asap f/u with cardiology or vascular (both in [facility])

## 2019-02-22 NOTE — TELEPHONE ENCOUNTER
Called and informed [facility]  States she needs it in writing and faxed to them  I faxed over a letter stating the same

## 2019-02-22 NOTE — TELEPHONE ENCOUNTER
Saintclair Mis from the [facility] called about pts legs  States both legs are swelling and weeping  Pt is taking 40mg daily of Lasix but it doesn't seem to be helping the pt  Can you please advise, thank you  Calista's call back number is [phone]

## 2019-03-07 ENCOUNTER — TELEPHONE (OUTPATIENT)
Dept: FAMILY MEDICINE CLINIC | Facility: CLINIC | Age: 84
End: 2019-03-07

## 2019-03-07 DIAGNOSIS — L03.119 CELLULITIS OF LOWER EXTREMITY, UNSPECIFIED LATERALITY: Primary | ICD-10-CM

## 2019-03-07 RX ORDER — CEPHALEXIN 500 MG/1
500 CAPSULE ORAL EVERY 8 HOURS SCHEDULED
Qty: 30 CAPSULE | Refills: 0 | Status: SHIPPED | OUTPATIENT
Start: 2019-03-07 | End: 2019-03-17

## 2019-03-07 NOTE — TELEPHONE ENCOUNTER
Spoke with patients son  He is unable to get patient to the office before the weekend and he reports his mothers leg was hit with something about 5 days ago, was just red around it but now reports the whole lower leg is red, swollen and painful  Son insisting on antibiotics as he feels she will end up in the ED over the weekend  He has to set up ambulance transport for her office visits  Will send antibiotics but recommend follow up next week if there is no improvement

## 2019-03-07 NOTE — TELEPHONE ENCOUNTER
Called and spoke with Alyx Velasquez at the [facility] and She will let Iza's son know that we would like to see Her here in the office for her Cellitis    He will call for appointment KD

## 2019-03-07 NOTE — TELEPHONE ENCOUNTER
Dinora Castillo from the [facility] called to let us know Fabby Mad may have Cellulitis legs are red & swollen  Looking for and Antiobitic called into [email or web address]   Thank You KD

## 2019-03-07 NOTE — TELEPHONE ENCOUNTER
Recommend patient come into office to be seen for cellulitis, can schedule with me or [facility] tomorrow

## 2019-05-25 ENCOUNTER — HOSPITAL ENCOUNTER (INPATIENT)
Facility: HOSPITAL | Age: 84
LOS: 2 days | Discharge: HOME | DRG: 301 | End: 2019-05-28
Attending: EMERGENCY MEDICINE | Admitting: INTERNAL MEDICINE
Payer: MEDICARE

## 2019-05-25 ENCOUNTER — APPOINTMENT (EMERGENCY)
Dept: RADIOLOGY | Facility: HOSPITAL | Age: 84
DRG: 301 | End: 2019-05-25
Payer: MEDICARE

## 2019-05-25 DIAGNOSIS — J18.9 PNEUMONIA OF RIGHT LOWER LOBE DUE TO INFECTIOUS ORGANISM: Primary | ICD-10-CM

## 2019-05-25 PROBLEM — R05.9 COUGH: Chronic | Status: ACTIVE | Noted: 2019-05-25

## 2019-05-25 PROBLEM — D72.823 LEUKEMOID REACTION: Status: ACTIVE | Noted: 2019-05-25

## 2019-05-25 PROBLEM — R42 LIGHTHEADEDNESS: Status: RESOLVED | Noted: 2019-01-19 | Resolved: 2019-05-25

## 2019-05-25 PROBLEM — R05.9 COUGH: Status: ACTIVE | Noted: 2019-05-25

## 2019-05-25 PROBLEM — I73.9 PVD (PERIPHERAL VASCULAR DISEASE) (HCC): Chronic | Status: ACTIVE | Noted: 2018-07-30

## 2019-05-25 PROBLEM — I87.2 EDEMA OF BOTH LOWER EXTREMITIES DUE TO PERIPHERAL VENOUS INSUFFICIENCY: Chronic | Status: ACTIVE | Noted: 2019-05-25

## 2019-05-25 PROBLEM — I10 HYPERTENSION: Chronic | Status: ACTIVE | Noted: 2019-05-25

## 2019-05-25 PROBLEM — D72.823 LEUKEMOID REACTION: Chronic | Status: ACTIVE | Noted: 2019-05-25

## 2019-05-25 PROBLEM — E78.5 HYPERLIPIDEMIA: Chronic | Status: ACTIVE | Noted: 2018-07-30

## 2019-05-25 LAB
ALBUMIN SERPL BCP-MCNC: 4 G/DL (ref 3.5–5.7)
ALP SERPL-CCNC: 101 U/L (ref 55–165)
ALT SERPL W P-5'-P-CCNC: 11 U/L (ref 7–52)
ANION GAP SERPL CALCULATED.3IONS-SCNC: 8 MMOL/L (ref 4–13)
APTT PPP: 35 SECONDS (ref 26–38)
AST SERPL W P-5'-P-CCNC: 13 U/L (ref 13–39)
BASOPHILS # BLD AUTO: 0.1 THOUSANDS/ΜL (ref 0–0.1)
BASOPHILS NFR BLD AUTO: 1 % (ref 0–2)
BILIRUB SERPL-MCNC: 0.5 MG/DL (ref 0.2–1)
BNP SERPL-MCNC: 75 PG/ML (ref 1–100)
BUN SERPL-MCNC: 15 MG/DL (ref 7–25)
CALCIUM SERPL-MCNC: 10 MG/DL (ref 8.6–10.5)
CHLORIDE SERPL-SCNC: 101 MMOL/L (ref 98–107)
CK SERPL-CCNC: 28 U/L (ref 30–192)
CO2 SERPL-SCNC: 30 MMOL/L (ref 21–31)
CREAT SERPL-MCNC: 0.82 MG/DL (ref 0.6–1.2)
EOSINOPHIL # BLD AUTO: 0.1 THOUSAND/ΜL (ref 0–0.61)
EOSINOPHIL NFR BLD AUTO: 1 % (ref 0–5)
ERYTHROCYTE [DISTWIDTH] IN BLOOD BY AUTOMATED COUNT: 13.8 % (ref 11.5–14.5)
GFR SERPL CREATININE-BSD FRML MDRD: 61 ML/MIN/1.73SQ M
GLUCOSE SERPL-MCNC: 129 MG/DL (ref 65–99)
HCT VFR BLD AUTO: 44.9 % (ref 42–47)
HGB BLD-MCNC: 15.2 G/DL (ref 12–16)
INR PPP: 1.12 (ref 0.9–1.5)
LACTATE SERPL-SCNC: 0.9 MMOL/L (ref 0.5–2)
LYMPHOCYTES # BLD AUTO: 0.9 THOUSANDS/ΜL (ref 0.6–4.47)
LYMPHOCYTES NFR BLD AUTO: 8 % (ref 21–51)
MAGNESIUM SERPL-MCNC: 2.3 MG/DL (ref 1.9–2.7)
MCH RBC QN AUTO: 30.5 PG (ref 26–34)
MCHC RBC AUTO-ENTMCNC: 33.8 G/DL (ref 31–37)
MCV RBC AUTO: 90 FL (ref 81–99)
MONOCYTES # BLD AUTO: 0.9 THOUSAND/ΜL (ref 0.17–1.22)
MONOCYTES NFR BLD AUTO: 9 % (ref 2–12)
NEUTROPHILS # BLD AUTO: 8.9 THOUSANDS/ΜL (ref 1.4–6.5)
NEUTS SEG NFR BLD AUTO: 82 % (ref 42–75)
PLATELET # BLD AUTO: 232 THOUSANDS/UL (ref 149–390)
PMV BLD AUTO: 8.5 FL (ref 8.6–11.7)
POTASSIUM SERPL-SCNC: 4.3 MMOL/L (ref 3.5–5.5)
PROT SERPL-MCNC: 7 G/DL (ref 6.4–8.9)
PROTHROMBIN TIME: 13 SECONDS (ref 10.2–13)
RBC # BLD AUTO: 4.98 MILLION/UL (ref 3.9–5.2)
SODIUM SERPL-SCNC: 139 MMOL/L (ref 134–143)
TROPONIN I SERPL-MCNC: <0.03 NG/ML
WBC # BLD AUTO: 11 THOUSAND/UL (ref 4.8–10.8)

## 2019-05-25 PROCEDURE — 84484 ASSAY OF TROPONIN QUANT: CPT | Performed by: EMERGENCY MEDICINE

## 2019-05-25 PROCEDURE — 80053 COMPREHEN METABOLIC PANEL: CPT | Performed by: EMERGENCY MEDICINE

## 2019-05-25 PROCEDURE — 85610 PROTHROMBIN TIME: CPT | Performed by: EMERGENCY MEDICINE

## 2019-05-25 PROCEDURE — 87040 BLOOD CULTURE FOR BACTERIA: CPT | Performed by: EMERGENCY MEDICINE

## 2019-05-25 PROCEDURE — 93005 ELECTROCARDIOGRAM TRACING: CPT

## 2019-05-25 PROCEDURE — 83735 ASSAY OF MAGNESIUM: CPT | Performed by: EMERGENCY MEDICINE

## 2019-05-25 PROCEDURE — 83605 ASSAY OF LACTIC ACID: CPT | Performed by: EMERGENCY MEDICINE

## 2019-05-25 PROCEDURE — 71046 X-RAY EXAM CHEST 2 VIEWS: CPT

## 2019-05-25 PROCEDURE — 83880 ASSAY OF NATRIURETIC PEPTIDE: CPT | Performed by: EMERGENCY MEDICINE

## 2019-05-25 PROCEDURE — 82550 ASSAY OF CK (CPK): CPT | Performed by: EMERGENCY MEDICINE

## 2019-05-25 PROCEDURE — 36415 COLL VENOUS BLD VENIPUNCTURE: CPT | Performed by: EMERGENCY MEDICINE

## 2019-05-25 PROCEDURE — 85730 THROMBOPLASTIN TIME PARTIAL: CPT | Performed by: EMERGENCY MEDICINE

## 2019-05-25 PROCEDURE — 1123F ACP DISCUSS/DSCN MKR DOCD: CPT | Performed by: INTERNAL MEDICINE

## 2019-05-25 PROCEDURE — 85025 COMPLETE CBC W/AUTO DIFF WBC: CPT | Performed by: EMERGENCY MEDICINE

## 2019-05-25 PROCEDURE — 99220 PR INITIAL OBSERVATION CARE/DAY 70 MINUTES: CPT | Performed by: NURSE PRACTITIONER

## 2019-05-25 PROCEDURE — 99285 EMERGENCY DEPT VISIT HI MDM: CPT

## 2019-05-25 RX ORDER — METHYLPREDNISOLONE SODIUM SUCCINATE 40 MG/ML
40 INJECTION, POWDER, LYOPHILIZED, FOR SOLUTION INTRAMUSCULAR; INTRAVENOUS EVERY 12 HOURS SCHEDULED
Status: DISCONTINUED | OUTPATIENT
Start: 2019-05-25 | End: 2019-05-26

## 2019-05-25 RX ORDER — CEFTRIAXONE 1 G/50ML
1000 INJECTION, SOLUTION INTRAVENOUS ONCE
Status: COMPLETED | OUTPATIENT
Start: 2019-05-25 | End: 2019-05-26

## 2019-05-25 RX ORDER — ALBUTEROL SULFATE 2.5 MG/3ML
2.5 SOLUTION RESPIRATORY (INHALATION) EVERY 4 HOURS PRN
Status: DISCONTINUED | OUTPATIENT
Start: 2019-05-25 | End: 2019-05-26

## 2019-05-25 RX ADMIN — CEFTRIAXONE 1000 MG: 1 INJECTION, SOLUTION INTRAVENOUS at 23:46

## 2019-05-26 LAB
ALBUMIN SERPL BCP-MCNC: 3.9 G/DL (ref 3.5–5.7)
ALP SERPL-CCNC: 101 U/L (ref 55–165)
ALT SERPL W P-5'-P-CCNC: 12 U/L (ref 7–52)
ANION GAP SERPL CALCULATED.3IONS-SCNC: 8 MMOL/L (ref 4–13)
AST SERPL W P-5'-P-CCNC: 13 U/L (ref 13–39)
ATRIAL RATE: 77 BPM
BACTERIA UR QL AUTO: ABNORMAL /HPF
BILIRUB SERPL-MCNC: 0.4 MG/DL (ref 0.2–1)
BILIRUB UR QL STRIP: NEGATIVE
BUN SERPL-MCNC: 12 MG/DL (ref 7–25)
CALCIUM SERPL-MCNC: 9.7 MG/DL (ref 8.6–10.5)
CHLORIDE SERPL-SCNC: 103 MMOL/L (ref 98–107)
CLARITY UR: ABNORMAL
CO2 SERPL-SCNC: 31 MMOL/L (ref 21–31)
COLOR UR: YELLOW
CREAT SERPL-MCNC: 0.71 MG/DL (ref 0.6–1.2)
ERYTHROCYTE [DISTWIDTH] IN BLOOD BY AUTOMATED COUNT: 13.6 % (ref 11.5–14.5)
GFR SERPL CREATININE-BSD FRML MDRD: 73 ML/MIN/1.73SQ M
GLUCOSE SERPL-MCNC: 130 MG/DL (ref 65–99)
GLUCOSE UR STRIP-MCNC: NEGATIVE MG/DL
HCT VFR BLD AUTO: 46.5 % (ref 42–47)
HGB BLD-MCNC: 15.5 G/DL (ref 12–16)
HGB UR QL STRIP.AUTO: ABNORMAL
KETONES UR STRIP-MCNC: NEGATIVE MG/DL
L PNEUMO1 AG UR QL IA.RAPID: NEGATIVE
LEUKOCYTE ESTERASE UR QL STRIP: ABNORMAL
LG PLATELETS BLD QL SMEAR: PRESENT
LYMPHOCYTES # BLD AUTO: 0.35 THOUSAND/UL (ref 0.6–4.47)
LYMPHOCYTES # BLD AUTO: 4 % (ref 20–51)
MCH RBC QN AUTO: 30 PG (ref 26–34)
MCHC RBC AUTO-ENTMCNC: 33.4 G/DL (ref 31–37)
MCV RBC AUTO: 90 FL (ref 81–99)
MONOCYTES # BLD AUTO: 0.17 THOUSAND/UL (ref 0–1.22)
MONOCYTES NFR BLD AUTO: 2 % (ref 4–12)
NEUTS SEG # BLD: 8.18 THOUSAND/UL (ref 1.81–6.82)
NEUTS SEG NFR BLD AUTO: 94 % (ref 42–75)
NITRITE UR QL STRIP: NEGATIVE
NON-SQ EPI CELLS URNS QL MICRO: ABNORMAL /HPF
P AXIS: 76 DEGREES
PH UR STRIP.AUTO: 6 [PH]
PLATELET # BLD AUTO: 217 THOUSANDS/UL (ref 149–390)
PMV BLD AUTO: 8.9 FL (ref 8.6–11.7)
POTASSIUM SERPL-SCNC: 4.1 MMOL/L (ref 3.5–5.5)
PR INTERVAL: 132 MS
PROCALCITONIN SERPL-MCNC: 0.07 NG/ML
PROT SERPL-MCNC: 6.8 G/DL (ref 6.4–8.9)
PROT UR STRIP-MCNC: NEGATIVE MG/DL
QRS AXIS: -37 DEGREES
QRSD INTERVAL: 126 MS
QT INTERVAL: 406 MS
QTC INTERVAL: 459 MS
RBC # BLD AUTO: 5.18 MILLION/UL (ref 3.9–5.2)
RBC #/AREA URNS AUTO: ABNORMAL /HPF
S PNEUM AG UR QL: NEGATIVE
SODIUM SERPL-SCNC: 142 MMOL/L (ref 134–143)
SP GR UR STRIP.AUTO: 1.02 (ref 1–1.03)
T WAVE AXIS: -2 DEGREES
TOTAL CELLS COUNTED SPEC: 100
UROBILINOGEN UR QL STRIP.AUTO: 0.2 E.U./DL
VENTRICULAR RATE: 77 BPM
WBC # BLD AUTO: 8.7 THOUSAND/UL (ref 4.8–10.8)
WBC #/AREA URNS AUTO: ABNORMAL /HPF

## 2019-05-26 PROCEDURE — 93010 ELECTROCARDIOGRAM REPORT: CPT | Performed by: INTERNAL MEDICINE

## 2019-05-26 PROCEDURE — 85007 BL SMEAR W/DIFF WBC COUNT: CPT | Performed by: NURSE PRACTITIONER

## 2019-05-26 PROCEDURE — 97163 PT EVAL HIGH COMPLEX 45 MIN: CPT

## 2019-05-26 PROCEDURE — 94760 N-INVAS EAR/PLS OXIMETRY 1: CPT

## 2019-05-26 PROCEDURE — 81003 URINALYSIS AUTO W/O SCOPE: CPT | Performed by: NURSE PRACTITIONER

## 2019-05-26 PROCEDURE — 97167 OT EVAL HIGH COMPLEX 60 MIN: CPT

## 2019-05-26 PROCEDURE — 85027 COMPLETE CBC AUTOMATED: CPT | Performed by: NURSE PRACTITIONER

## 2019-05-26 PROCEDURE — G8978 MOBILITY CURRENT STATUS: HCPCS

## 2019-05-26 PROCEDURE — 87449 NOS EACH ORGANISM AG IA: CPT | Performed by: NURSE PRACTITIONER

## 2019-05-26 PROCEDURE — G8989 SELF CARE D/C STATUS: HCPCS

## 2019-05-26 PROCEDURE — G8988 SELF CARE GOAL STATUS: HCPCS

## 2019-05-26 PROCEDURE — 94664 DEMO&/EVAL PT USE INHALER: CPT

## 2019-05-26 PROCEDURE — 81001 URINALYSIS AUTO W/SCOPE: CPT | Performed by: NURSE PRACTITIONER

## 2019-05-26 PROCEDURE — G8979 MOBILITY GOAL STATUS: HCPCS

## 2019-05-26 PROCEDURE — 99225 PR SBSQ OBSERVATION CARE/DAY 25 MINUTES: CPT | Performed by: INTERNAL MEDICINE

## 2019-05-26 PROCEDURE — G8987 SELF CARE CURRENT STATUS: HCPCS

## 2019-05-26 PROCEDURE — 84145 PROCALCITONIN (PCT): CPT | Performed by: NURSE PRACTITIONER

## 2019-05-26 PROCEDURE — 80053 COMPREHEN METABOLIC PANEL: CPT | Performed by: NURSE PRACTITIONER

## 2019-05-26 RX ORDER — FUROSEMIDE 40 MG/1
40 TABLET ORAL DAILY
Status: DISCONTINUED | OUTPATIENT
Start: 2019-05-26 | End: 2019-05-26

## 2019-05-26 RX ORDER — PRAVASTATIN SODIUM 40 MG
40 TABLET ORAL
Status: DISCONTINUED | OUTPATIENT
Start: 2019-05-26 | End: 2019-05-28 | Stop reason: HOSPADM

## 2019-05-26 RX ORDER — SODIUM CHLORIDE 9 MG/ML
100 INJECTION, SOLUTION INTRAVENOUS CONTINUOUS
Status: DISCONTINUED | OUTPATIENT
Start: 2019-05-26 | End: 2019-05-26

## 2019-05-26 RX ORDER — ACETAMINOPHEN 325 MG/1
650 TABLET ORAL EVERY 4 HOURS PRN
Status: DISCONTINUED | OUTPATIENT
Start: 2019-05-26 | End: 2019-05-28 | Stop reason: HOSPADM

## 2019-05-26 RX ORDER — ONDANSETRON 2 MG/ML
4 INJECTION INTRAMUSCULAR; INTRAVENOUS EVERY 6 HOURS PRN
Status: DISCONTINUED | OUTPATIENT
Start: 2019-05-26 | End: 2019-05-28 | Stop reason: HOSPADM

## 2019-05-26 RX ORDER — LATANOPROST 50 UG/ML
1 SOLUTION/ DROPS OPHTHALMIC
Status: DISCONTINUED | OUTPATIENT
Start: 2019-05-26 | End: 2019-05-28 | Stop reason: HOSPADM

## 2019-05-26 RX ORDER — ASPIRIN 81 MG/1
81 TABLET, CHEWABLE ORAL DAILY
Status: DISCONTINUED | OUTPATIENT
Start: 2019-05-26 | End: 2019-05-28 | Stop reason: HOSPADM

## 2019-05-26 RX ORDER — CEFTRIAXONE 1 G/50ML
1000 INJECTION, SOLUTION INTRAVENOUS
Status: DISCONTINUED | OUTPATIENT
Start: 2019-05-26 | End: 2019-05-28

## 2019-05-26 RX ORDER — HEPARIN SODIUM 5000 [USP'U]/ML
5000 INJECTION, SOLUTION INTRAVENOUS; SUBCUTANEOUS EVERY 8 HOURS SCHEDULED
Status: DISCONTINUED | OUTPATIENT
Start: 2019-05-26 | End: 2019-05-28 | Stop reason: HOSPADM

## 2019-05-26 RX ORDER — DOCUSATE SODIUM 100 MG/1
100 CAPSULE, LIQUID FILLED ORAL 2 TIMES DAILY
Status: DISCONTINUED | OUTPATIENT
Start: 2019-05-26 | End: 2019-05-28 | Stop reason: HOSPADM

## 2019-05-26 RX ADMIN — CEFTRIAXONE 1000 MG: 1 INJECTION, SOLUTION INTRAVENOUS at 21:58

## 2019-05-26 RX ADMIN — METHYLPREDNISOLONE SODIUM SUCCINATE 40 MG: 40 INJECTION, POWDER, FOR SOLUTION INTRAMUSCULAR; INTRAVENOUS at 08:26

## 2019-05-26 RX ADMIN — AZITHROMYCIN FOR INJECTION INJECTION, POWDER, LYOPHILIZED, FOR SOLUTION 500 MG: 500 INJECTION INTRAVENOUS at 01:12

## 2019-05-26 RX ADMIN — FUROSEMIDE 40 MG: 40 TABLET ORAL at 08:25

## 2019-05-26 RX ADMIN — Medication 1 TABLET: at 08:25

## 2019-05-26 RX ADMIN — HEPARIN SODIUM 5000 UNITS: 5000 INJECTION INTRAVENOUS; SUBCUTANEOUS at 14:32

## 2019-05-26 RX ADMIN — ASPIRIN 81 MG 81 MG: 81 TABLET ORAL at 08:25

## 2019-05-26 RX ADMIN — HEPARIN SODIUM 5000 UNITS: 5000 INJECTION INTRAVENOUS; SUBCUTANEOUS at 06:02

## 2019-05-26 RX ADMIN — SODIUM CHLORIDE 100 ML/HR: 9 INJECTION, SOLUTION INTRAVENOUS at 01:04

## 2019-05-26 RX ADMIN — DOCUSATE SODIUM 100 MG: 100 CAPSULE, LIQUID FILLED ORAL at 17:09

## 2019-05-26 RX ADMIN — LATANOPROST 1 DROP: 50 SOLUTION OPHTHALMIC at 21:58

## 2019-05-26 RX ADMIN — DOCUSATE SODIUM 100 MG: 100 CAPSULE, LIQUID FILLED ORAL at 08:26

## 2019-05-26 RX ADMIN — HEPARIN SODIUM 5000 UNITS: 5000 INJECTION INTRAVENOUS; SUBCUTANEOUS at 21:58

## 2019-05-26 RX ADMIN — METHYLPREDNISOLONE SODIUM SUCCINATE 40 MG: 40 INJECTION, POWDER, FOR SOLUTION INTRAMUSCULAR; INTRAVENOUS at 01:08

## 2019-05-26 RX ADMIN — PRAVASTATIN SODIUM 40 MG: 40 TABLET ORAL at 17:09

## 2019-05-26 RX ADMIN — AZITHROMYCIN MONOHYDRATE 500 MG: 500 INJECTION, POWDER, LYOPHILIZED, FOR SOLUTION INTRAVENOUS at 22:46

## 2019-05-27 PROBLEM — J18.9 PNEUMONIA OF RIGHT LOWER LOBE DUE TO INFECTIOUS ORGANISM: Chronic | Status: RESOLVED | Noted: 2019-05-25 | Resolved: 2019-05-27

## 2019-05-27 PROBLEM — D72.823 LEUKEMOID REACTION: Chronic | Status: RESOLVED | Noted: 2019-05-25 | Resolved: 2019-05-27

## 2019-05-27 PROCEDURE — 99239 HOSP IP/OBS DSCHRG MGMT >30: CPT | Performed by: INTERNAL MEDICINE

## 2019-05-27 RX ORDER — GUAIFENESIN/DEXTROMETHORPHAN 100-10MG/5
5 SYRUP ORAL 3 TIMES DAILY PRN
Qty: 118 ML | Refills: 0 | Status: ON HOLD
Start: 2019-05-27

## 2019-05-27 RX ADMIN — PRAVASTATIN SODIUM 40 MG: 40 TABLET ORAL at 17:09

## 2019-05-27 RX ADMIN — Medication 1 TABLET: at 08:37

## 2019-05-27 RX ADMIN — LATANOPROST 1 DROP: 50 SOLUTION OPHTHALMIC at 21:57

## 2019-05-27 RX ADMIN — ASPIRIN 81 MG 81 MG: 81 TABLET ORAL at 08:37

## 2019-05-27 RX ADMIN — CEFTRIAXONE 1000 MG: 1 INJECTION, SOLUTION INTRAVENOUS at 21:41

## 2019-05-27 RX ADMIN — AZITHROMYCIN MONOHYDRATE 500 MG: 500 INJECTION, POWDER, LYOPHILIZED, FOR SOLUTION INTRAVENOUS at 22:27

## 2019-05-27 RX ADMIN — DOCUSATE SODIUM 100 MG: 100 CAPSULE, LIQUID FILLED ORAL at 08:37

## 2019-05-27 RX ADMIN — HEPARIN SODIUM 5000 UNITS: 5000 INJECTION INTRAVENOUS; SUBCUTANEOUS at 14:05

## 2019-05-27 RX ADMIN — DOCUSATE SODIUM 100 MG: 100 CAPSULE, LIQUID FILLED ORAL at 17:09

## 2019-05-27 RX ADMIN — HEPARIN SODIUM 5000 UNITS: 5000 INJECTION INTRAVENOUS; SUBCUTANEOUS at 06:28

## 2019-05-27 RX ADMIN — HEPARIN SODIUM 5000 UNITS: 5000 INJECTION INTRAVENOUS; SUBCUTANEOUS at 21:41

## 2019-05-28 VITALS
TEMPERATURE: 96.9 F | DIASTOLIC BLOOD PRESSURE: 55 MMHG | RESPIRATION RATE: 18 BRPM | SYSTOLIC BLOOD PRESSURE: 120 MMHG | BODY MASS INDEX: 28.59 KG/M2 | HEART RATE: 66 BPM | HEIGHT: 63 IN | WEIGHT: 161.38 LBS | OXYGEN SATURATION: 94 %

## 2019-05-28 PROCEDURE — 97530 THERAPEUTIC ACTIVITIES: CPT

## 2019-05-28 PROCEDURE — 94640 AIRWAY INHALATION TREATMENT: CPT

## 2019-05-28 PROCEDURE — 94760 N-INVAS EAR/PLS OXIMETRY 1: CPT

## 2019-05-28 PROCEDURE — 97116 GAIT TRAINING THERAPY: CPT

## 2019-05-28 RX ORDER — IPRATROPIUM BROMIDE AND ALBUTEROL SULFATE 2.5; .5 MG/3ML; MG/3ML
3 SOLUTION RESPIRATORY (INHALATION)
Status: DISCONTINUED | OUTPATIENT
Start: 2019-05-28 | End: 2019-05-28 | Stop reason: HOSPADM

## 2019-05-28 RX ADMIN — HEPARIN SODIUM 5000 UNITS: 5000 INJECTION INTRAVENOUS; SUBCUTANEOUS at 05:16

## 2019-05-28 RX ADMIN — DOCUSATE SODIUM 100 MG: 100 CAPSULE, LIQUID FILLED ORAL at 09:48

## 2019-05-28 RX ADMIN — ASPIRIN 81 MG 81 MG: 81 TABLET ORAL at 09:48

## 2019-05-28 RX ADMIN — IPRATROPIUM BROMIDE AND ALBUTEROL SULFATE 3 ML: 2.5; .5 SOLUTION RESPIRATORY (INHALATION) at 10:47

## 2019-05-28 RX ADMIN — Medication 1 TABLET: at 09:48

## 2019-05-29 ENCOUNTER — TRANSITIONAL CARE MANAGEMENT (OUTPATIENT)
Dept: FAMILY MEDICINE CLINIC | Facility: CLINIC | Age: 84
End: 2019-05-29

## 2019-05-31 LAB
BACTERIA BLD CULT: NORMAL
BACTERIA BLD CULT: NORMAL

## 2019-07-07 DIAGNOSIS — Z76.0 MEDICATION REFILL: Primary | ICD-10-CM

## 2019-07-08 RX ORDER — FUROSEMIDE 40 MG/1
TABLET ORAL
Qty: 90 TABLET | Refills: 2 | Status: SHIPPED | OUTPATIENT
Start: 2019-07-08 | End: 2019-07-20 | Stop reason: SDUPTHER

## 2019-07-20 DIAGNOSIS — Z76.0 MEDICATION REFILL: ICD-10-CM

## 2019-07-22 RX ORDER — FUROSEMIDE 40 MG/1
TABLET ORAL
Qty: 90 TABLET | Refills: 2 | Status: ON HOLD | OUTPATIENT
Start: 2019-07-22 | End: 2020-01-21 | Stop reason: SDUPTHER

## 2019-07-30 RX ORDER — SIMVASTATIN 20 MG
TABLET ORAL
Qty: 90 TABLET | Refills: 2 | OUTPATIENT
Start: 2019-07-30

## 2019-10-02 ENCOUNTER — TELEPHONE (OUTPATIENT)
Dept: FAMILY MEDICINE CLINIC | Facility: CLINIC | Age: 84
End: 2019-10-02

## 2019-12-07 ENCOUNTER — OFFICE VISIT (OUTPATIENT)
Dept: URGENT CARE | Facility: CLINIC | Age: 84
End: 2019-12-07
Payer: MEDICARE

## 2019-12-07 VITALS
RESPIRATION RATE: 18 BRPM | TEMPERATURE: 98 F | HEART RATE: 63 BPM | BODY MASS INDEX: 28.53 KG/M2 | DIASTOLIC BLOOD PRESSURE: 53 MMHG | SYSTOLIC BLOOD PRESSURE: 117 MMHG | OXYGEN SATURATION: 96 % | WEIGHT: 161 LBS | HEIGHT: 63 IN

## 2019-12-07 DIAGNOSIS — K04.7 DENTAL INFECTION: Primary | ICD-10-CM

## 2019-12-07 PROCEDURE — G0463 HOSPITAL OUTPT CLINIC VISIT: HCPCS | Performed by: NURSE PRACTITIONER

## 2019-12-07 PROCEDURE — 99213 OFFICE O/P EST LOW 20 MIN: CPT | Performed by: NURSE PRACTITIONER

## 2019-12-07 RX ORDER — AMOXICILLIN 875 MG/1
875 TABLET, COATED ORAL 2 TIMES DAILY
Qty: 20 TABLET | Refills: 0 | Status: SHIPPED | OUTPATIENT
Start: 2019-12-07 | End: 2019-12-17

## 2019-12-07 NOTE — PATIENT INSTRUCTIONS
Alivia Jean has an infection but not an abscess  However, the teaching sheet below is still excellent  Salt water rinses may be done as often as she would like to help with pain  Tylenol may be given as needed for pain (as previously ordered)  She should take the amoxicillin as ordered until completed (one 875 mg tab by mouth 2x/day x 10 days)  She will need to follow-up with her dentist--let them know when scheduling the appointment that she was started on the amoxicillin  Dental Abscess   AMBULATORY CARE:   A dental abscess  is a collection of pus in or around a tooth  A dental abscess is caused by bacteria  The bacteria usually enter the tooth when the enamel (outer part of the tooth) is damaged by tooth decay  Bacteria may also enter the tooth through a break or chip in the tooth, or a cut in the gum  Food particles that are stuck between the teeth for a long time may also lead to an abscess  Signs and symptoms of a dental abscess:   · Toothache, a loose tooth, or a tooth that is very sensitive to pressure or temperature    · Bad breath, unpleasant taste, and drooling    · Fever    · Pain, redness, and swelling of the gums, or swelling of your face and neck    · Pain when you open or close your mouth    · Trouble opening your mouth  Seek care immediately if:   · You have severe pain  · You have trouble breathing because of pain or swelling  Contact your healthcare provider if:   · Your symptoms get worse, even after treatment  · Your mouth is bleeding  · You cannot eat or drink because of pain or swelling  · Your abscess returns  · You have an injury that causes a crack in your tooth  · You have questions or concerns about your condition or care  Treatment:  You may  need any of the following:  · Medicines  may be given to treat a bacterial infection and decrease pain  · Incision and drainage  is a cut in the abscess to allow the pus to drain   A sample of fluid may be collected from your abscess  The fluid is sent to a lab and tested for bacteria  Ask your healthcare provider for more information  · A root canal  is a procedure to remove the bacteria and prevent more infection  It is usually done after an incision and drainage  A filling or crown will be placed over the tooth after you have healed from your root canal      · Tooth removal  may be needed if the infection affects deeper tissues  This is usually done after an incision and drainage  Self-care:   · Rinse your mouth every 2 hours with salt water  This will help keep the area clean  · Gently brush your teeth twice a day with a soft tooth brush  This will help keep the area clean  · Eat soft foods as directed  Soft foods may cause less pain  Examples include applesauce, yogurt, and cooked pasta  Ask your healthcare provider how long to follow this instruction  · Apply a warm compress to your tooth or gum  Use a cotton ball or gauze soaked in warm water  Remove the compress in 10 minutes or when it becomes cool  Repeat 3 times a day  Prevent another abscess:   · Brush your teeth at least 2 times a day with fluoride toothpaste  · Use dental floss to clean between your teeth at least once a day  · Rinse your mouth with water or mouthwash after meals and snacks  · Chew sugarless gum after meals and snacks  · Limit foods that are sticky and high in sugar such as raisons  Also limit drinks high in sugar, such as soda  · See your dentist every 6 months for dental cleanings and oral exams  Follow up with your healthcare provider in 24 hours: Your healthcare provider will need to check your teeth and gums  Write down your questions so you remember to ask them during your visits  © 2017 [employer] Information is for End User's use only and may not be sold, redistributed or otherwise used for commercial purposes   All illustrations and images included in CareNotes® are the copyrighted property of [employer]  or Luis Andrade  The above information is an  only  It is not intended as medical advice for individual conditions or treatments  Talk to your doctor, nurse or pharmacist before following any medical regimen to see if it is safe and effective for you

## 2019-12-07 NOTE — PROGRESS NOTES
[facility]        NAME: Arjun Shafer is a 80 y o  female  : 6/15/1923    MRN: [id number]  DATE: 2019  TIME: 6:34 PM    Assessment and Plan   Dental infection [K04 7]  1  Dental infection  amoxicillin (AMOXIL) 875 mg tablet         Patient Instructions     Patient Instructions     Thelma Cleveland has an infection but not an abscess  However, the teaching sheet below is still excellent  Salt water rinses may be done as often as she would like to help with pain  Tylenol may be given as needed for pain (as previously ordered)  She should take the amoxicillin as ordered until completed (one 875 mg tab by mouth 2x/day x 10 days)  She will need to follow-up with her dentist--let them know when scheduling the appointment that she was started on the amoxicillin  Dental Abscess   AMBULATORY CARE:   A dental abscess  is a collection of pus in or around a tooth  A dental abscess is caused by bacteria  The bacteria usually enter the tooth when the enamel (outer part of the tooth) is damaged by tooth decay  Bacteria may also enter the tooth through a break or chip in the tooth, or a cut in the gum  Food particles that are stuck between the teeth for a long time may also lead to an abscess  Signs and symptoms of a dental abscess:   · Toothache, a loose tooth, or a tooth that is very sensitive to pressure or temperature    · Bad breath, unpleasant taste, and drooling    · Fever    · Pain, redness, and swelling of the gums, or swelling of your face and neck    · Pain when you open or close your mouth    · Trouble opening your mouth  Seek care immediately if:   · You have severe pain  · You have trouble breathing because of pain or swelling  Contact your healthcare provider if:   · Your symptoms get worse, even after treatment  · Your mouth is bleeding  · You cannot eat or drink because of pain or swelling  · Your abscess returns      · You have an injury that causes a crack in your tooth     · You have questions or concerns about your condition or care  Treatment:  You may  need any of the following:  · Medicines  may be given to treat a bacterial infection and decrease pain  · Incision and drainage  is a cut in the abscess to allow the pus to drain  A sample of fluid may be collected from your abscess  The fluid is sent to a lab and tested for bacteria  Ask your healthcare provider for more information  · A root canal  is a procedure to remove the bacteria and prevent more infection  It is usually done after an incision and drainage  A filling or crown will be placed over the tooth after you have healed from your root canal      · Tooth removal  may be needed if the infection affects deeper tissues  This is usually done after an incision and drainage  Self-care:   · Rinse your mouth every 2 hours with salt water  This will help keep the area clean  · Gently brush your teeth twice a day with a soft tooth brush  This will help keep the area clean  · Eat soft foods as directed  Soft foods may cause less pain  Examples include applesauce, yogurt, and cooked pasta  Ask your healthcare provider how long to follow this instruction  · Apply a warm compress to your tooth or gum  Use a cotton ball or gauze soaked in warm water  Remove the compress in 10 minutes or when it becomes cool  Repeat 3 times a day  Prevent another abscess:   · Brush your teeth at least 2 times a day with fluoride toothpaste  · Use dental floss to clean between your teeth at least once a day  · Rinse your mouth with water or mouthwash after meals and snacks  · Chew sugarless gum after meals and snacks  · Limit foods that are sticky and high in sugar such as raisons  Also limit drinks high in sugar, such as soda  · See your dentist every 6 months for dental cleanings and oral exams  Follow up with your healthcare provider in 24 hours:   Your healthcare provider will need to check your teeth and gums  Write down your questions so you remember to ask them during your visits  © 2017 [employer] Information is for End User's use only and may not be sold, redistributed or otherwise used for commercial purposes  All illustrations and images included in CareNotes® are the copyrighted property of [employer]  or Luis Andrade  The above information is an  only  It is not intended as medical advice for individual conditions or treatments  Talk to your doctor, nurse or pharmacist before following any medical regimen to see if it is safe and effective for you  Follow up with PCP in 3-5 days  Proceed to  ER if symptoms worsen  Chief Complaint     Chief Complaint   Patient presents with    Dental Pain     facial swelling from broken tooth for 2 days         History of Present Illness       Patient broke her front left lower to about 2 days ago  Today son was visiting and notice significant facial swelling, so he brings her in to be seen  She does a home dentist whom he will schedule appointment with for her  Son is the primary spokesperson due to patient being extremely hard of hearing, although patient does participate in a conversation as well  Review of Systems   Review of Systems   HENT: Positive for dental problem and facial swelling  All other systems reviewed and are negative          Current Medications       Current Outpatient Medications:     Acetaminophen (APAP) 325 MG tablet, Take 650 mg by mouth every 4 (four) hours as needed for mild pain, Disp: , Rfl:     aspirin 81 MG tablet, Take 81 mg by mouth daily, Disp: , Rfl:     Coenzyme Q10 (CO Q 10) 100 MG CAPS, Take by mouth, Disp: , Rfl:     Difluprednate (DUREZOL) 0 05 % EMUL, Apply to eye Install 1 drop into right eye every morning, Disp: , Rfl:     docusate sodium (COLACE) 100 mg capsule, Take 100 mg by mouth 2 (two) times a day, Disp: , Rfl:     Fluocinolone Acetonide Body 0 01 % OIL, Apply topically, Disp: , Rfl:     furosemide (LASIX) 40 mg tablet, TAKE ONE TABLET BY MOUTH ONCE DAILY, Disp: 90 tablet, Rfl: 2    latanoprost (XALATAN) 0 005 % ophthalmic solution, , Disp: , Rfl:     Multiple Vitamin (MULTIVITAMINS PO), Take by mouth daily, Disp: , Rfl:     simvastatin (ZOCOR) 20 mg tablet, Take 20 mg by mouth daily, Disp: , Rfl:     sodium chloride (IRISH 128) 5 % hypertonic ophthalmic ointment, 1 drop daily, Disp: , Rfl:     triamcinolone (KENALOG) 0 1 % cream, Apply topically 2 (two) times a day, Disp: 30 g, Rfl: 1    amoxicillin (AMOXIL) 875 mg tablet, Take 1 tablet (875 mg total) by mouth 2 (two) times a day for 10 days, Disp: 20 tablet, Rfl: 0    dextromethorphan-guaiFENesin (ROBITUSSIN DM)  mg/5 mL syrup, Take 5 mL by mouth 3 (three) times a day as needed for cough (Patient not taking: Reported on 12/7/2019), Disp: 118 mL, Rfl: 0    Current Allergies     Allergies as of 12/07/2019    (No Known Allergies)            The following portions of the patient's history were reviewed and updated as appropriate: allergies, current medications, past family history, past medical history, past social history, past surgical history and problem list      Past Medical History:   Diagnosis Date    Blind left eye     Fx of fibula     Hypertension        Past Surgical History:   Procedure Laterality Date    EYE SURGERY      HYSTERECTOMY      THYROID SURGERY         History reviewed  No pertinent family history  Medications have been verified  Objective   /53   Pulse 63   Temp 98 °F (36 7 °C) (Tympanic)   Resp 18   Ht 5' 3" (1 6 m)   Wt 73 kg (161 lb)   LMP  (LMP Unknown)   SpO2 96%   BMI 28 52 kg/m²        Physical Exam     Physical Exam   Constitutional: She appears well-developed and well-nourished  No distress  HENT:   Head: Normocephalic and atraumatic         Mouth/Throat: Uvula is midline, oropharynx is clear and moist and mucous membranes are normal  Abnormal dentition  No dental abscesses  Pulmonary/Chest: Effort normal  No respiratory distress  Neurological: She is alert  Skin: Skin is warm and dry  Capillary refill takes less than 2 seconds  She is not diaphoretic  Psychiatric: She has a normal mood and affect  Her behavior is normal  Judgment and thought content normal    Nursing note and vitals reviewed

## 2019-12-28 ENCOUNTER — APPOINTMENT (EMERGENCY)
Dept: CT IMAGING | Facility: HOSPITAL | Age: 84
DRG: 068 | End: 2019-12-28
Payer: MEDICARE

## 2019-12-28 ENCOUNTER — APPOINTMENT (EMERGENCY)
Dept: RADIOLOGY | Facility: HOSPITAL | Age: 84
DRG: 068 | End: 2019-12-28
Payer: MEDICARE

## 2019-12-28 ENCOUNTER — HOSPITAL ENCOUNTER (INPATIENT)
Facility: HOSPITAL | Age: 84
LOS: 1 days | Discharge: HOME | DRG: 068 | End: 2019-12-30
Attending: INTERNAL MEDICINE | Admitting: INTERNAL MEDICINE
Payer: MEDICARE

## 2019-12-28 DIAGNOSIS — R11.11 NON-INTRACTABLE VOMITING WITHOUT NAUSEA, UNSPECIFIED VOMITING TYPE: ICD-10-CM

## 2019-12-28 DIAGNOSIS — N39.0 URINARY TRACT INFECTION WITHOUT HEMATURIA, SITE UNSPECIFIED: Primary | ICD-10-CM

## 2019-12-28 DIAGNOSIS — E78.49 OTHER HYPERLIPIDEMIA: Chronic | ICD-10-CM

## 2019-12-28 DIAGNOSIS — R53.1 WEAKNESS: ICD-10-CM

## 2019-12-28 PROBLEM — K59.09 OTHER CONSTIPATION: Status: ACTIVE | Noted: 2019-12-28

## 2019-12-28 PROBLEM — N30.00 ACUTE CYSTITIS WITHOUT HEMATURIA: Status: ACTIVE | Noted: 2019-12-28

## 2019-12-28 PROBLEM — R41.89 COGNITIVE IMPAIRMENT: Status: ACTIVE | Noted: 2019-12-28

## 2019-12-28 PROBLEM — R29.90 STROKE-LIKE SYMPTOM: Status: ACTIVE | Noted: 2019-12-28

## 2019-12-28 LAB
ANION GAP SERPL CALCULATED.3IONS-SCNC: 7 MMOL/L (ref 4–13)
APTT PPP: 30 SECONDS (ref 23–37)
BACTERIA UR QL AUTO: ABNORMAL /HPF
BILIRUB UR QL STRIP: NEGATIVE
BUN SERPL-MCNC: 14 MG/DL (ref 7–25)
CALCIUM SERPL-MCNC: 9.5 MG/DL (ref 8.6–10.5)
CHLORIDE SERPL-SCNC: 104 MMOL/L (ref 98–107)
CLARITY UR: ABNORMAL
CO2 SERPL-SCNC: 30 MMOL/L (ref 21–31)
COLOR UR: YELLOW
CREAT SERPL-MCNC: 0.85 MG/DL (ref 0.6–1.2)
ERYTHROCYTE [DISTWIDTH] IN BLOOD BY AUTOMATED COUNT: 13.7 % (ref 11.5–14.5)
FLUAV RNA NPH QL NAA+PROBE: NORMAL
FLUBV RNA NPH QL NAA+PROBE: NORMAL
GFR SERPL CREATININE-BSD FRML MDRD: 58 ML/MIN/1.73SQ M
GLUCOSE SERPL-MCNC: 144 MG/DL (ref 65–99)
GLUCOSE SERPL-MCNC: 164 MG/DL (ref 65–140)
GLUCOSE UR STRIP-MCNC: NEGATIVE MG/DL
HCT VFR BLD AUTO: 45.6 % (ref 42–47)
HGB BLD-MCNC: 15 G/DL (ref 12–16)
HGB UR QL STRIP.AUTO: ABNORMAL
INR PPP: 1.04 (ref 0.9–1.5)
KETONES UR STRIP-MCNC: NEGATIVE MG/DL
LEUKOCYTE ESTERASE UR QL STRIP: ABNORMAL
MCH RBC QN AUTO: 30.4 PG (ref 26–34)
MCHC RBC AUTO-ENTMCNC: 32.8 G/DL (ref 31–37)
MCV RBC AUTO: 93 FL (ref 81–99)
NITRITE UR QL STRIP: POSITIVE
NON-SQ EPI CELLS URNS QL MICRO: ABNORMAL /HPF
PH UR STRIP.AUTO: 7.5 [PH]
PLATELET # BLD AUTO: 229 THOUSANDS/UL (ref 149–390)
PMV BLD AUTO: 8.8 FL (ref 8.6–11.7)
POTASSIUM SERPL-SCNC: 4 MMOL/L (ref 3.5–5.5)
PROT UR STRIP-MCNC: ABNORMAL MG/DL
PROTHROMBIN TIME: 12.1 SECONDS (ref 10.2–13)
RBC # BLD AUTO: 4.94 MILLION/UL (ref 3.9–5.2)
RBC #/AREA URNS AUTO: ABNORMAL /HPF
RSV RNA NPH QL NAA+PROBE: NORMAL
SODIUM SERPL-SCNC: 141 MMOL/L (ref 134–143)
SP GR UR STRIP.AUTO: 1.01 (ref 1–1.03)
TROPONIN I SERPL-MCNC: <0.03 NG/ML
UROBILINOGEN UR QL STRIP.AUTO: 0.2 E.U./DL
WBC # BLD AUTO: 8 THOUSAND/UL (ref 4.8–10.8)
WBC #/AREA URNS AUTO: ABNORMAL /HPF

## 2019-12-28 PROCEDURE — 70498 CT ANGIOGRAPHY NECK: CPT

## 2019-12-28 PROCEDURE — 82948 REAGENT STRIP/BLOOD GLUCOSE: CPT

## 2019-12-28 PROCEDURE — 87077 CULTURE AEROBIC IDENTIFY: CPT | Performed by: INTERNAL MEDICINE

## 2019-12-28 PROCEDURE — 84484 ASSAY OF TROPONIN QUANT: CPT | Performed by: INTERNAL MEDICINE

## 2019-12-28 PROCEDURE — 96374 THER/PROPH/DIAG INJ IV PUSH: CPT

## 2019-12-28 PROCEDURE — 70496 CT ANGIOGRAPHY HEAD: CPT

## 2019-12-28 PROCEDURE — 87631 RESP VIRUS 3-5 TARGETS: CPT | Performed by: INTERNAL MEDICINE

## 2019-12-28 PROCEDURE — 80048 BASIC METABOLIC PNL TOTAL CA: CPT | Performed by: INTERNAL MEDICINE

## 2019-12-28 PROCEDURE — 99220 PR INITIAL OBSERVATION CARE/DAY 70 MINUTES: CPT | Performed by: NURSE PRACTITIONER

## 2019-12-28 PROCEDURE — 93005 ELECTROCARDIOGRAM TRACING: CPT

## 2019-12-28 PROCEDURE — 36415 COLL VENOUS BLD VENIPUNCTURE: CPT | Performed by: INTERNAL MEDICINE

## 2019-12-28 PROCEDURE — 85027 COMPLETE CBC AUTOMATED: CPT | Performed by: INTERNAL MEDICINE

## 2019-12-28 PROCEDURE — 85730 THROMBOPLASTIN TIME PARTIAL: CPT | Performed by: INTERNAL MEDICINE

## 2019-12-28 PROCEDURE — 81001 URINALYSIS AUTO W/SCOPE: CPT | Performed by: INTERNAL MEDICINE

## 2019-12-28 PROCEDURE — 85610 PROTHROMBIN TIME: CPT | Performed by: INTERNAL MEDICINE

## 2019-12-28 PROCEDURE — 99285 EMERGENCY DEPT VISIT HI MDM: CPT

## 2019-12-28 PROCEDURE — 99285 EMERGENCY DEPT VISIT HI MDM: CPT | Performed by: INTERNAL MEDICINE

## 2019-12-28 PROCEDURE — 71045 X-RAY EXAM CHEST 1 VIEW: CPT

## 2019-12-28 PROCEDURE — 87186 SC STD MICRODIL/AGAR DIL: CPT | Performed by: INTERNAL MEDICINE

## 2019-12-28 PROCEDURE — 74176 CT ABD & PELVIS W/O CONTRAST: CPT

## 2019-12-28 PROCEDURE — 87086 URINE CULTURE/COLONY COUNT: CPT | Performed by: INTERNAL MEDICINE

## 2019-12-28 RX ORDER — LEVOFLOXACIN 5 MG/ML
750 INJECTION, SOLUTION INTRAVENOUS ONCE
Status: COMPLETED | OUTPATIENT
Start: 2019-12-28 | End: 2019-12-28

## 2019-12-28 RX ORDER — ONDANSETRON 2 MG/ML
4 INJECTION INTRAMUSCULAR; INTRAVENOUS EVERY 6 HOURS PRN
Status: DISCONTINUED | OUTPATIENT
Start: 2019-12-28 | End: 2019-12-30 | Stop reason: HOSPADM

## 2019-12-28 RX ORDER — LOPERAMIDE HYDROCHLORIDE 2 MG/1
2 CAPSULE ORAL 4 TIMES DAILY PRN
Status: ON HOLD | COMMUNITY

## 2019-12-28 RX ORDER — ONDANSETRON 2 MG/ML
4 INJECTION INTRAMUSCULAR; INTRAVENOUS ONCE
Status: COMPLETED | OUTPATIENT
Start: 2019-12-28 | End: 2019-12-28

## 2019-12-28 RX ORDER — NYSTATIN 100000 [USP'U]/G
POWDER TOPICAL 2 TIMES DAILY PRN
Status: DISCONTINUED | OUTPATIENT
Start: 2019-12-28 | End: 2019-12-30 | Stop reason: HOSPADM

## 2019-12-28 RX ORDER — LATANOPROST 50 UG/ML
1 SOLUTION/ DROPS OPHTHALMIC
Status: DISCONTINUED | OUTPATIENT
Start: 2019-12-28 | End: 2019-12-30 | Stop reason: HOSPADM

## 2019-12-28 RX ORDER — TRIAMCINOLONE ACETONIDE 1 MG/G
CREAM TOPICAL 2 TIMES DAILY PRN
Status: DISCONTINUED | OUTPATIENT
Start: 2019-12-28 | End: 2019-12-30 | Stop reason: HOSPADM

## 2019-12-28 RX ORDER — TIMOLOL MALEATE 5 MG/ML
1 SOLUTION/ DROPS OPHTHALMIC DAILY
Status: DISCONTINUED | OUTPATIENT
Start: 2019-12-28 | End: 2019-12-30 | Stop reason: HOSPADM

## 2019-12-28 RX ORDER — DOCUSATE SODIUM 100 MG/1
100 CAPSULE, LIQUID FILLED ORAL 2 TIMES DAILY
Status: DISCONTINUED | OUTPATIENT
Start: 2019-12-28 | End: 2019-12-30 | Stop reason: HOSPADM

## 2019-12-28 RX ORDER — SILICONE ADHESIVE 1.5" X 3"
1 SHEET (EA) TOPICAL DAILY
Status: DISCONTINUED | OUTPATIENT
Start: 2019-12-28 | End: 2019-12-30 | Stop reason: HOSPADM

## 2019-12-28 RX ORDER — CEFTRIAXONE 1 G/50ML
1000 INJECTION, SOLUTION INTRAVENOUS EVERY 24 HOURS
Status: DISCONTINUED | OUTPATIENT
Start: 2019-12-29 | End: 2019-12-30

## 2019-12-28 RX ORDER — ASPIRIN 81 MG/1
81 TABLET ORAL DAILY
Status: DISCONTINUED | OUTPATIENT
Start: 2019-12-28 | End: 2019-12-30 | Stop reason: HOSPADM

## 2019-12-28 RX ORDER — NYSTATIN 100000 [USP'U]/G
POWDER TOPICAL 2 TIMES DAILY PRN
Status: ON HOLD | COMMUNITY

## 2019-12-28 RX ORDER — POLYETHYLENE GLYCOL 3350 17 G/17G
17 POWDER, FOR SOLUTION ORAL DAILY
Status: DISCONTINUED | OUTPATIENT
Start: 2019-12-28 | End: 2019-12-30 | Stop reason: HOSPADM

## 2019-12-28 RX ORDER — PRAVASTATIN SODIUM 40 MG
40 TABLET ORAL
Status: DISCONTINUED | OUTPATIENT
Start: 2019-12-28 | End: 2019-12-30 | Stop reason: HOSPADM

## 2019-12-28 RX ADMIN — ENOXAPARIN SODIUM 40 MG: 40 INJECTION SUBCUTANEOUS at 16:28

## 2019-12-28 RX ADMIN — LATANOPROST 1 DROP: 50 SOLUTION OPHTHALMIC at 21:10

## 2019-12-28 RX ADMIN — PRAVASTATIN SODIUM 40 MG: 40 TABLET ORAL at 16:28

## 2019-12-28 RX ADMIN — ONDANSETRON 4 MG: 2 INJECTION INTRAMUSCULAR; INTRAVENOUS at 10:58

## 2019-12-28 RX ADMIN — LEVOFLOXACIN 750 MG: 750 INJECTION, SOLUTION INTRAVENOUS at 12:27

## 2019-12-28 RX ADMIN — IOHEXOL 85 ML: 350 INJECTION, SOLUTION INTRAVENOUS at 10:18

## 2019-12-28 NOTE — ED PROVIDER NOTES
History  No chief complaint on file  80YEAR-OLD FEMALE RESIDENT AT THE [facility] developed sudden onset of weakness and was lowered to the floor she had a large episode of vomiting  Upon arrival EMS evaluated the patient the patient had with a perceived is a left facial droop at that time  The remainder of the examination seems unremarkable vital signs were stable, bilateral upper extremity examination was equal lower extremity also equal weakness bilaterally in the lower extremities the patient is mostly wheelchair bound  She was alert and cooperative at that time  Prior to Admission Medications   Prescriptions Last Dose Informant Patient Reported? Taking?    Acetaminophen (APAP) 325 MG tablet   Yes No   Sig: Take 650 mg by mouth every 4 (four) hours as needed for mild pain   Coenzyme Q10 (CO Q 10) 100 MG CAPS   Yes No   Sig: Take by mouth   Difluprednate (DUREZOL) 0 05 % EMUL   Yes No   Sig: Apply to eye Install 1 drop into right eye every morning   Fluocinolone Acetonide Body 0 01 % OIL   Yes No   Sig: Apply topically   Multiple Vitamin (MULTIVITAMINS PO)   Yes No   Sig: Take by mouth daily   aspirin 81 MG tablet   Yes No   Sig: Take 81 mg by mouth daily   dextromethorphan-guaiFENesin (ROBITUSSIN DM)  mg/5 mL syrup   No No   Sig: Take 5 mL by mouth 3 (three) times a day as needed for cough   Patient not taking: Reported on 2019   docusate sodium (COLACE) 100 mg capsule   Yes No   Sig: Take 100 mg by mouth 2 (two) times a day   furosemide (LASIX) 40 mg tablet   No No   Sig: TAKE ONE TABLET BY MOUTH ONCE DAILY   latanoprost (XALATAN) 0 005 % ophthalmic solution   Yes No   simvastatin (ZOCOR) 20 mg tablet   Yes No   Sig: Take 20 mg by mouth daily   sodium chloride (IRISH 128) 5 % hypertonic ophthalmic ointment   Yes No   Si drop daily   triamcinolone (KENALOG) 0 1 % cream   No No   Sig: Apply topically 2 (two) times a day      Facility-Administered Medications: None       Past Medical History:   Diagnosis Date    Blind left eye     Fx of fibula     Hypertension        Past Surgical History:   Procedure Laterality Date    EYE SURGERY      HYSTERECTOMY      THYROID SURGERY         No family history on file  I have reviewed and agree with the history as documented  Social History     Tobacco Use    Smoking status: Never Smoker    Smokeless tobacco: Never Used   Substance Use Topics    Alcohol use: Never     Frequency: Never     Binge frequency: Never    Drug use: No        Review of Systems   Constitutional: Positive for activity change  Respiratory: Negative  Cardiovascular: Negative  Gastrointestinal: Positive for vomiting  Endocrine: Negative  Genitourinary: Negative  Musculoskeletal: Positive for gait problem  Skin: Negative  Neurological: Positive for facial asymmetry and weakness  As per EMS they reported a possible left facial droop  Hematological: Negative  Psychiatric/Behavioral: Negative  Physical Exam  Physical Exam   Constitutional: She is oriented to person, place, and time  She appears well-developed and well-nourished  No distress  HENT:   Head: Normocephalic  Mouth/Throat: Oropharynx is clear and moist    Eyes: Conjunctivae and EOM are normal    Patient's left eye is completely opacified does not react  Neck: Normal range of motion  Neck supple  Cardiovascular: Normal rate, regular rhythm, normal heart sounds and intact distal pulses  Pulmonary/Chest: Effort normal and breath sounds normal    Abdominal: Soft  Bowel sounds are normal    Musculoskeletal: She exhibits deformity  Neurological: She is alert and oriented to person, place, and time  No cranial nerve deficit  Skin: Skin is warm and dry  Psychiatric: She has a normal mood and affect  Her behavior is normal  Judgment and thought content normal    Nursing note and vitals reviewed        Vital Signs  ED Triage Vitals   Temp Pulse Resp BP SpO2   -- -- -- -- --      Temp src Heart Rate Source Patient Position - Orthostatic VS BP Location FiO2 (%)   -- -- -- -- --      Pain Score       --           There were no vitals filed for this visit  Visual Acuity      ED Medications  Medications - No data to display    Diagnostic Studies  Results Reviewed     Procedure Component Value Units Date/Time    Basic metabolic panel [[id number]]     Lab Status:  No result Specimen:  Blood     CBC and Platelet [[id number]]     Lab Status:  No result Specimen:  Blood     Protime-INR [[id number]]     Lab Status:  No result Specimen:  Blood     APTT [[id number]]     Lab Status:  No result Specimen:  Blood     Troponin I [[id number]]     Lab Status:  No result Specimen:  Blood     Fingerstick Glucose (POCT) [[id number]]  (Abnormal) Collected:  12/28/19 0957    Lab Status:  Final result Updated:  12/28/19 0958     POC Glucose 164 mg/dl                  CT stroke alert brain    (Results Pending)   CTA stroke alert (head/neck)    (Results Pending)   X-ray chest 1 view portable    (Results Pending)              Procedures  ECG 12 Lead Documentation Only  Date/Time: 12/28/2019 11:50 AM  Performed by: Puja Stout MD  Authorized by: Puja Stout MD     Indications / Diagnosis:  Change mental status  ECG reviewed by me, the ED Provider: yes    Interpretation:     Interpretation: abnormal    Rate:     ECG rate:  75    ECG rate assessment: normal    Rhythm:     Rhythm: sinus rhythm    Ectopy:     Ectopy: none    Conduction:     Conduction: abnormal      Abnormal conduction: complete RBBB    ST segments:     ST segments:  Normal  T waves:     T waves: non-specific               ED Course                               MDM      Disposition  Final diagnoses:   None     ED Disposition     None      Follow-up Information    None         Patient's Medications   Discharge Prescriptions    No medications on file     No discharge procedures on file      ED Provider  Electronically Signed by Chitra Coley MD  12/28/19 4216

## 2019-12-28 NOTE — ASSESSMENT & PLAN NOTE
· Patient had weakness and facial droop today  Weakness suspected to be exacerbated with UTI as well     · CT head and CTA head and neck reviewed   · CTA head/neck severe narrowing M1 segment of the right middle cerebral artery with diminished flow throughout the distal right middle cerebral artery   · Will admit patient under stroke pathway   · Will check MRI and echocardiogram   · Continue with ASA and statin   · Consult neurology   · PT/OT/speech

## 2019-12-28 NOTE — ASSESSMENT & PLAN NOTE
· CT abdomen and pelvis shows constipation and fecal impaction  · Will start the patient on stool softener will monitor closely

## 2019-12-28 NOTE — PLAN OF CARE
Problem: Neurological Deficit  Goal: Neurological status is stable or improving  Description  Interventions:  - Monitor and assess patient's level of consciousness, motor function, sensory function, and level of assistance needed for ADLs  - Monitor and report changes from baseline  Collaborate with interdisciplinary team to initiate plan and implement interventions as ordered  - Provide and maintain a safe environment  - Consider fall precautions  Outcome: Progressing     Problem: Communication Impairment  Goal: Ability to express needs and understand communication  Description  Assess patient's communication skills and ability to understand information  Patient will demonstrate use of effective communication techniques, alternative methods of communication and understanding even if not able to speak  - Encourage communication and provide alternate methods of communication as needed  - Collaborate with case management/ for discharge needs  - Include patient/family/caregiver in decisions related to communication    Outcome: Progressing

## 2019-12-28 NOTE — ED NOTES
Pt called as prehospital stroke alert  Pt was visualized by Dr Armin Shah and then brought immediately to ct scan by this rn  After CT of head and CTA ct of the abd was then performed  Pt and this rn arrived back in room at 10:30         Susie Frye RN  12/28/19 5955

## 2019-12-28 NOTE — H&P
H&P- Chiquita Helton 6/15/1923, 80 y o  female MRN: [id number]    Unit/Bed#: -01 Encounter: [id number]    Primary Care Provider: No primary care provider on file  Date and time admitted to hospital: 12/28/2019  9:58 AM        * Stroke-like symptom  Assessment & Plan  · Patient had weakness and facial droop today  Weakness suspected to be exacerbated with UTI as well  · CT head and CTA head and neck reviewed   · CTA head/neck severe narrowing M1 segment of the right middle cerebral artery with diminished flow throughout the distal right middle cerebral artery   · Will admit patient under stroke pathway   · Will check MRI and echocardiogram   · Continue with ASA and statin   · Consult neurology   · PT/OT/speech     Other constipation  Assessment & Plan  · CT abdomen and pelvis shows constipation and fecal impaction  · Will start the patient on stool softener will monitor closely    Cognitive impairment  Assessment & Plan  · Continue supportive care     Acute cystitis without hematuria  Assessment & Plan  · Received levaquin x 1 in ED   · Pending urine culture   · Will start ceftriaxone on the floor     Essential hypertension  Assessment & Plan  · Monitor BP closely   · Will hold furosemide for now as she vomited at personal care home     PVD (peripheral vascular disease) ([facility])  Assessment & Plan  · Continue with ASA and statin       VTE Prophylaxis: Enoxaparin (Lovenox)  Code Status: DNR/DNI  POLST: There is no POLST form on file for this patient (pre-hospital)  Discussion with family: no family presented at bedside at the time of assessment     Anticipated Length of Stay:  Patient will be admitted on an Observation basis with an anticipated length of stay of  > 2 midnights  Justification for Hospital Stay: stroke-like symptoms    Chief Complaint:   Sudden onset of weakness     History of Present Illness:    Chiquita Helton is a 80 y o  female who presents with sudden onset of weakness    The patient is a resident of the [facility] in [place]  She was noted by the staff with a sudden onset of weakness during breakfast and had a large episodes of nonbloody vomiting  Upon arrival EMS, noted a left side facial droop  Upon arrival to the ED the patient appears to have no significant neurological deficit able to follow command appropriately  CT head and CTA head and neck were done which shows severe narrowing of the M1 segment of the right cerebral artery  The patient is somewhat a poor historian suspected underlying cognitive impairment and most of the information was obtained from medical record  Currently the patient denies any pain  She was unable to tell me what happened and why she is in the hospital today  She states that she just feeling weak  The patient uses wheelchair at the assisted living  Review of Systems:  Review of Systems    Past Medical and Surgical History:   Past Medical History:   Diagnosis Date    Blind left eye     Fx of fibula     Hypertension        Past Surgical History:   Procedure Laterality Date    EYE SURGERY      HYSTERECTOMY      THYROID SURGERY         Meds/Allergies:  Prior to Admission medications    Medication Sig Start Date End Date Taking?  Authorizing Provider   aspirin 81 MG tablet Take 81 mg by mouth daily   Yes Historical Provider, MD   Coenzyme Q10 (CO Q 10) 100 MG CAPS Take by mouth daily    Yes Historical Provider, MD   Difluprednate (DUREZOL) 0 05 % EMUL Apply to eye Install 1 drop into right eye every morning   Yes Historical Provider, MD   furosemide (LASIX) 40 mg tablet TAKE ONE TABLET BY MOUTH ONCE DAILY  Patient taking differently: 60 mg Take 1 5 tab 7/22/19  Yes VALDEZ Cruz   Multiple Vitamin (MULTIVITAMINS PO) Take by mouth daily   Yes Historical Provider, MD   sodium chloride (IRISH 128) 5 % hypertonic ophthalmic ointment Administer 1 drop to the right eye daily    Yes Historical Provider, MD   timolol (BETIMOL) 0 5 % ophthalmic solution Administer 1 drop to the right eye daily   Yes Historical Provider, MD   Acetaminophen (APAP) 325 MG tablet Take 650 mg by mouth every 4 (four) hours as needed for mild pain    Historical Provider, MD   dextromethorphan-guaiFENesin (ROBITUSSIN DM)  mg/5 mL syrup Take 5 mL by mouth 3 (three) times a day as needed for cough 5/27/19   Nikole Champagne MD   latanoprost (XALATAN) 0 005 % ophthalmic solution Administer 1 drop to the right eye daily at bedtime     Historical Provider, MD   loperamide (IMODIUM) 2 mg capsule Take 2 mg by mouth 4 (four) times a day as needed for diarrhea    Historical Provider, MD   nystatin (MYCOSTATIN) powder Apply topically 2 (two) times a day as needed    Historical Provider, MD   simvastatin (ZOCOR) 20 mg tablet Take 20 mg by mouth daily at bedtime     Historical Provider, MD   triamcinolone (KENALOG) 0 1 % cream Apply topically 2 (two) times a day  Patient taking differently: Apply topically 2 (two) times a day as needed  2/8/19   VALDEZ Norman   docusate sodium (COLACE) 100 mg capsule Take 100 mg by mouth 2 (two) times a day  12/28/19  Historical Provider, MD   Fluocinolone Acetonide Body 0 01 % OIL Apply topically  12/28/19  Historical Provider, MD     I have reveiwed home medications using records provided by [facility]  Allergies: No Known Allergies    Social History:  Marital Status:     Occupation: retired   Patient Pre-hospital Living Situation: assisted living   Patient Pre-hospital Level of Mobility: wheelchair   Patient Pre-hospital Diet Restrictions: none   Substance Use History:     Social History     Substance and Sexual Activity   Alcohol Use Never    Frequency: Never    Binge frequency: Never     Social History     Tobacco Use   Smoking Status Never Smoker   Smokeless Tobacco Never Used     Social History     Substance and Sexual Activity   Drug Use No       Family History:  I have reviewed the patients family history    Physical Exam:   Vitals:   Blood Pressure: 124/68 (12/28/19 1600)  Pulse: 62 (12/28/19 1600)  Temperature: 97 6 °F (36 4 °C) (12/28/19 1600)  Temp Source: Tympanic (12/28/19 1600)  Respirations: 18 (12/28/19 1600)  Height: 5' 4" (162 6 cm) (12/28/19 1358)  Weight - Scale: 85 kg (187 lb 5 oz) (12/28/19 1358)  SpO2: 99 % (12/28/19 1600)    Physical Exam   Constitutional: She appears well-developed  No distress  Frail elderly male      HENT:   Head: Normocephalic and atraumatic  Mouth/Throat: Oropharynx is clear and moist    Eyes: Pupils are equal, round, and reactive to light  Conjunctivae and EOM are normal    Neck: Normal range of motion  Neck supple  No thyromegaly present  Cardiovascular: Normal rate, regular rhythm and normal heart sounds  Exam reveals no gallop and no friction rub  No murmur heard  Pulmonary/Chest: Effort normal and breath sounds normal  She has no wheezes  She has no rales  Abdominal: Soft  Bowel sounds are normal  She exhibits no distension  There is no tenderness  There is no rebound  Musculoskeletal: Normal range of motion  She exhibits no edema, tenderness or deformity  Neurological: She is alert  No cranial nerve deficit  Suspected cognitive impairment    Skin: Skin is warm and dry  No rash noted  No erythema  Psychiatric: She has a normal mood and affect  Her behavior is normal    Vitals reviewed  Additional Data:   Lab Results: I have personally reviewed pertinent reports        Results from last 7 days   Lab Units 12/28/19  1037   WBC Thousand/uL 8 00   HEMOGLOBIN g/dL 15 0   HEMATOCRIT % 45 6   PLATELETS Thousands/uL 229     Results from last 7 days   Lab Units 12/28/19  1036   POTASSIUM mmol/L 4 0   CHLORIDE mmol/L 104   CO2 mmol/L 30   BUN mg/dL 14   CREATININE mg/dL 0 85   CALCIUM mg/dL 9 5     Results from last 7 days   Lab Units 12/28/19  1037   INR  1 04     Results from last 7 days   Lab Units 12/28/19  0957   POC GLUCOSE mg/dl 164*           Imaging: I have personally reviewed pertinent reports  CT abdomen pelvis wo contrast   ED Interpretation by Sourav Teague MD (12/28 1149)   1  Colonic diverticulosis  2   Mild constipation and fecal impaction  3   Cholelithiasis without CT evidence of acute cholecystitis  4   Hiatal hernia  5   Mild abnormal appearance of the urinary bladder  Although the findings may be due to under distention, correlate for cystitis/urinary tract infection  The study was marked in [facility] for immediate notification  Workstation performed: [id number]         Final Result by Macy Kaiser DO (12/28 1128)   1  Colonic diverticulosis  2   Mild constipation and fecal impaction  3   Cholelithiasis without CT evidence of acute cholecystitis  4   Hiatal hernia  5   Mild abnormal appearance of the urinary bladder  Although the findings may be due to under distention, correlate for cystitis/urinary tract infection  The study was marked in [facility] for immediate notification  Workstation performed: [id number]         CTA stroke alert (head/neck)   ED Interpretation by Sourav Teague MD (12/28 6249)   Severe narrowing of the M1 segment of the right middle cerebral artery with diminished flow throughout the distal right middle cerebral artery territory branches  Findings were directly discussed with Juhi Salamanca on 12/28/2019 10:21 AM                      Workstation performed: [id number]         Final Result by Macy Kaiser DO (12/28 1121)   Severe narrowing of the M1 segment of the right middle cerebral artery with diminished flow throughout the distal right middle cerebral artery territory branches              Findings were directly discussed with Juhi Salamanca on 12/28/2019 10:21 AM                      Workstation performed: [id number]         CT stroke alert brain   ED Interpretation by Sourav Teague MD (12/28 9059)   Cerebral atrophy with extensive chronic small vessel ischemic white matter disease  If there is continued concern for acute infarction, a follow-up MRI of the brain with diffusion-weighted imaging should be obtained  Findings were directly discussed with Soto Sanabria on 12/28/2019 10:09 AM       Workstation performed: [id number]         Final Result by Marion Wong DO (12/28 1016)   Cerebral atrophy with extensive chronic small vessel ischemic white matter disease  If there is continued concern for acute infarction, a follow-up MRI of the brain with diffusion-weighted imaging should be obtained  Findings were directly discussed with Soto Sanabria on 12/28/2019 10:09 AM       Workstation performed: [id number]         X-ray chest 1 view portable    (Results Pending)   MRI inpatient order    (Results Pending)       EKG, Pathology, and Other Studies Reviewed on Admission:   · EKG: NSR HR 75 RBBB    NetAccess/Epic Records Reviewed: Yes     ** Please Note: This note has been constructed using a voice recognition system   **

## 2019-12-29 LAB
ANION GAP SERPL CALCULATED.3IONS-SCNC: 8 MMOL/L (ref 4–13)
BUN SERPL-MCNC: 12 MG/DL (ref 7–25)
CALCIUM SERPL-MCNC: 9.3 MG/DL (ref 8.6–10.5)
CHLORIDE SERPL-SCNC: 105 MMOL/L (ref 98–107)
CHOLEST SERPL-MCNC: 195 MG/DL (ref 0–200)
CO2 SERPL-SCNC: 30 MMOL/L (ref 21–31)
CREAT SERPL-MCNC: 0.79 MG/DL (ref 0.6–1.2)
ERYTHROCYTE [DISTWIDTH] IN BLOOD BY AUTOMATED COUNT: 13.8 % (ref 11.5–14.5)
EST. AVERAGE GLUCOSE BLD GHB EST-MCNC: 105 MG/DL
GFR SERPL CREATININE-BSD FRML MDRD: 63 ML/MIN/1.73SQ M
GLUCOSE SERPL-MCNC: 78 MG/DL (ref 65–99)
HBA1C MFR BLD: 5.3 % (ref 4.2–6.3)
HCT VFR BLD AUTO: 44.6 % (ref 42–47)
HDLC SERPL-MCNC: 30 MG/DL
HGB BLD-MCNC: 14.3 G/DL (ref 12–16)
LDLC SERPL CALC-MCNC: 124 MG/DL (ref 0–100)
MAGNESIUM SERPL-MCNC: 2.2 MG/DL (ref 1.9–2.7)
MCH RBC QN AUTO: 29.9 PG (ref 26–34)
MCHC RBC AUTO-ENTMCNC: 32.1 G/DL (ref 31–37)
MCV RBC AUTO: 93 FL (ref 81–99)
PLATELET # BLD AUTO: 210 THOUSANDS/UL (ref 149–390)
PMV BLD AUTO: 8.9 FL (ref 8.6–11.7)
POTASSIUM SERPL-SCNC: 3.8 MMOL/L (ref 3.5–5.5)
RBC # BLD AUTO: 4.79 MILLION/UL (ref 3.9–5.2)
SODIUM SERPL-SCNC: 143 MMOL/L (ref 134–143)
TRIGL SERPL-MCNC: 206 MG/DL (ref 44–166)
WBC # BLD AUTO: 6.8 THOUSAND/UL (ref 4.8–10.8)

## 2019-12-29 PROCEDURE — 80061 LIPID PANEL: CPT | Performed by: NURSE PRACTITIONER

## 2019-12-29 PROCEDURE — 97167 OT EVAL HIGH COMPLEX 60 MIN: CPT

## 2019-12-29 PROCEDURE — G8978 MOBILITY CURRENT STATUS: HCPCS

## 2019-12-29 PROCEDURE — G8987 SELF CARE CURRENT STATUS: HCPCS

## 2019-12-29 PROCEDURE — 80048 BASIC METABOLIC PNL TOTAL CA: CPT | Performed by: NURSE PRACTITIONER

## 2019-12-29 PROCEDURE — 99232 SBSQ HOSP IP/OBS MODERATE 35: CPT | Performed by: NURSE PRACTITIONER

## 2019-12-29 PROCEDURE — G8979 MOBILITY GOAL STATUS: HCPCS

## 2019-12-29 PROCEDURE — 83036 HEMOGLOBIN GLYCOSYLATED A1C: CPT | Performed by: NURSE PRACTITIONER

## 2019-12-29 PROCEDURE — 83735 ASSAY OF MAGNESIUM: CPT | Performed by: NURSE PRACTITIONER

## 2019-12-29 PROCEDURE — 97163 PT EVAL HIGH COMPLEX 45 MIN: CPT

## 2019-12-29 PROCEDURE — 85027 COMPLETE CBC AUTOMATED: CPT | Performed by: NURSE PRACTITIONER

## 2019-12-29 PROCEDURE — G8988 SELF CARE GOAL STATUS: HCPCS

## 2019-12-29 RX ADMIN — SODIUM CHLORIDE 1 DROP: 50 SOLUTION/ DROPS OPHTHALMIC at 08:57

## 2019-12-29 RX ADMIN — ASPIRIN 81 MG: 81 TABLET, COATED ORAL at 08:56

## 2019-12-29 RX ADMIN — Medication 1 TABLET: at 08:56

## 2019-12-29 RX ADMIN — ENOXAPARIN SODIUM 40 MG: 40 INJECTION SUBCUTANEOUS at 08:57

## 2019-12-29 RX ADMIN — CEFTRIAXONE 1000 MG: 1 INJECTION, SOLUTION INTRAVENOUS at 06:02

## 2019-12-29 RX ADMIN — DOCUSATE SODIUM 100 MG: 100 CAPSULE, LIQUID FILLED ORAL at 17:00

## 2019-12-29 RX ADMIN — LATANOPROST 1 DROP: 50 SOLUTION OPHTHALMIC at 21:45

## 2019-12-29 RX ADMIN — NYSTATIN 1 APPLICATION: 100000 POWDER TOPICAL at 09:13

## 2019-12-29 RX ADMIN — DOCUSATE SODIUM 100 MG: 100 CAPSULE, LIQUID FILLED ORAL at 08:56

## 2019-12-29 RX ADMIN — TIMOLOL MALEATE 1 DROP: 5 SOLUTION/ DROPS OPHTHALMIC at 08:57

## 2019-12-29 RX ADMIN — PRAVASTATIN SODIUM 40 MG: 40 TABLET ORAL at 17:00

## 2019-12-29 NOTE — ASSESSMENT & PLAN NOTE
· Patient had weakness and facial droop today  Weakness suspected to be exacerbated with UTI as well     · CT head and CTA head and neck reviewed   · CTA head/neck severe narrowing M1 segment of the right middle cerebral artery with diminished flow throughout the distal right middle cerebral artery   · Patient was admitted under stroke pathway   · Pending MRI and echocardiogram   · Continue with ASA and statin   · Consult neurology   · PT/OT/speech   · Clinically is much improved- no focal neurological deficits

## 2019-12-29 NOTE — UTILIZATION REVIEW
Initial Clinical Review    Admission: Date/Time/Statement:   Orders Placed This Encounter   Procedures    Place in Observation (expected length of stay for this patient is less than two midnights)     Standing Status:   Standing     Number of Occurrences:   1     Order Specific Question:   Admitting Physician     Answer:   Krzysztof Singh [[id number]]     Order Specific Question:   Level of Care     Answer:   Med Surg [[id number]]     ED Arrival Information     Expected Arrival Acuity Means of Arrival Escorted By Service Admission Type    - 12/28/2019 09:53 [facility] Hospitalist Emergency    Arrival Complaint    stroke like symptoms        Chief Complaint   Patient presents with    STROKE Alert    Vomiting     Assessment/Plan: 80 y o  female who presents with sudden onset of weakness  [facility]  She was noted by the staff with a sudden onset of weakness during breakfast and had a large episodes of nonbloody vomiting  Upon arrival EMS, noted a left side facial droop  Upon arrival to the ED the patient appears to have no significant neurological deficit able to follow command appropriately  CT head and CTA head and neck were done which shows severe narrowing of the M1 segment of the right cerebral artery  The patient is somewhat a poor historian suspected underlying cognitive impairment and most of the information was obtained from medical record  Currently the patient denies any pain  She was unable to tell me what happened and why she is in the hospital today  She states that she just feeling weak  The patient uses wheelchair at the assisted living  * Stroke-like symptom  Assessment & Plan  · Patient had weakness and facial droop today  Weakness suspected to be exacerbated with UTI as well     · CT head and CTA head and neck reviewed   · CTA head/neck severe narrowing M1 segment of the right middle cerebral artery with diminished flow throughout the distal right middle cerebral artery   · Will admit patient under stroke pathway   · Will check MRI and echocardiogram   · Continue with ASA and statin   · Consult neurology   · PT/OT/speech      Other constipation  Assessment & Plan  · CT abdomen and pelvis shows constipation and fecal impaction  · Will start the patient on stool softener will monitor closely     Cognitive impairment  Assessment & Plan  · Continue supportive care      Acute cystitis without hematuria  Assessment & Plan  · Received levaquin x 1 in ED   · Pending urine culture   · Will start ceftriaxone on the floor      Essential hypertension  Assessment & Plan  · Monitor BP closely   · Will hold furosemide for now as she vomited at personal care home      PVD (peripheral vascular disease) ([facility] [address] )  Assessment & Plan  · Continue with ASA and statin   VTE Prophylaxis: Enoxaparin (Lovenox)  Code Status: DNR/DNI  POLST: There is no POLST form on file for this patient (pre-hospital)  Discussion with family: no family presented at bedside at the time of assessment      Anticipated Length of Stay:  Patient will be admitted on an Observation basis with an anticipated length of stay of  > 2 midnights     Justification for Hospital Stay: stroke-like symptoms     ED Triage Vitals [12/28/19 1039]   Temperature Pulse Respirations Blood Pressure SpO2   (!) 95 2 °F (35 1 °C) 70 18 136/69 98 %      Temp Source Heart Rate Source Patient Position - Orthostatic VS BP Location FiO2 (%)   Tympanic Monitor Lying Left arm --      Pain Score       No Pain        Wt Readings from Last 1 Encounters:   12/28/19 85 kg (187 lb 5 oz)     Additional Vital Signs:   12/28/19 1900  97 3 °F (36 3 °C)Abnormal   74  16  145/70  99 %  Nasal cannula  Lying   12/28/19 1800  97 2 °F (36 2 °C)Abnormal   79  18  124/70  98 %  Nasal cannula  Lying   12/28/19 1700  97 2 °F (36 2 °C)Abnormal   64  18  121/67  96 %  Nasal cannula  Lying   12/28/19 1600  97 6 °F (36 4 °C)  62  18 124/68  99 %  Nasal cannula  Lying   12/28/19 1541          99 %  Nasal cannula     12/28/19 1531  97 1 °F (36 2 °C)Abnormal   58  16  125/66      Lying   12/28/19 1358  98 °F (36 7 °C)  76  18  144/98  96 %  Nasal cannula  Lying   12/28/19 1230    73  17  118/56  100 %       12/28/19 1200  97 8 °F (36 6 °C)  68  15  122/58  99 %         Pertinent Labs/Diagnostic Test Results:   CT HEAD NECK STROKE ALERT Severe narrowing of the M1 segment of the right middle cerebral artery with diminished flow throughout the distal right middle cerebral artery territory branches  cxr No acute cardiopulmonary disease  Ct HEAD Cerebral atrophy with extensive chronic small vessel ischemic white matter disease     If there is continued concern for acute infarction, a follow-up MRI of the brain with diffusion-weighted imaging should be obtained  Ct abdomen pelvis 1   Colonic diverticulosis  2   Mild constipation and fecal impaction  3   Cholelithiasis without CT evidence of acute cholecystitis  4   Hiatal hernia  5   Mild abnormal appearance of the urinary bladder   Although the findings may be due to under distention, correlate for cystitis/urinary tract infection    Results from last 7 days   Lab Units 12/29/19  0610 12/28/19  1037   WBC Thousand/uL 6 80 8 00   HEMOGLOBIN g/dL 14 3 15 0   HEMATOCRIT % 44 6 45 6   PLATELETS Thousands/uL 210 229         Results from last 7 days   Lab Units 12/29/19  0610 12/28/19  1036   SODIUM mmol/L 143 141   POTASSIUM mmol/L 3 8 4 0   CHLORIDE mmol/L 105 104   CO2 mmol/L 30 30   ANION GAP mmol/L 8 7   BUN mg/dL 12 14   CREATININE mg/dL 0 79 0 85   EGFR ml/min/1 73sq m 63 58   CALCIUM mg/dL 9 3 9 5   MAGNESIUM mg/dL 2 2  --          Results from last 7 days   Lab Units 12/28/19  0957   POC GLUCOSE mg/dl 164*     Results from last 7 days   Lab Units 12/29/19  0610 12/28/19  1036   GLUCOSE RANDOM mg/dL 78 144*       Results from last 7 days   Lab Units 12/28/19  1037   TROPONIN I ng/mL <0 03         Results from last 7 days   Lab Units 12/28/19  1037   PROTIME seconds 12 1   INR  1 04   PTT seconds 30     Results from last 7 days   Lab Units 12/28/19  1107   CLARITY UA  Turbid*   COLOR UA  Yellow   SPEC GRAV UA  1 010   PH UA  7 5   GLUCOSE UA mg/dl Negative   KETONES UA mg/dl Negative   BLOOD UA  1+*   PROTEIN UA mg/dl Trace*   NITRITE UA  Positive*   BILIRUBIN UA  Negative   UROBILINOGEN UA E U /dl 0 2   LEUKOCYTES UA  3+*   WBC UA /hpf Innumerable*   RBC UA /hpf Field obscured, unable to enumerate*   BACTERIA UA /hpf Moderate*   EPITHELIAL CELLS WET PREP /hpf Occasional     Results from last 7 days   Lab Units 12/28/19  1058   INFLUENZA A PCR  None Detected   RSV PCR  None Detected     Results from last 7 days   Lab Units 12/28/19  1107   URINE CULTURE  >100,000 cfu/ml Gram Negative Steven Enteric Like*               ED Treatment:   Medication Administration from 12/28/2019 0951 to 12/28/2019 1335       Date/Time Order Dose Route Action Action by Comments     12/28/2019 1058 ondansetron (ZOFRAN) injection 4 mg 4 mg Intravenous Given Billy Quintana RN      12/28/2019 1018 iohexol (OMNIPAQUE) 350 MG/ML injection (SINGLE-DOSE) 85 mL 85 mL Intravenous Given Odettemini Olson      12/28/2019 1227 levofloxacin (LEVAQUIN) IVPB (premix) 750 mg 750 mg Intravenous New Bag Billy Quintana RN         Past Medical History:   Diagnosis Date    Blind left eye     Fx of fibula     Hypertension      Present on Admission:   PVD (peripheral vascular disease) ([facility] [address] )   Essential hypertension      Admitting Diagnosis: No admission diagnoses are documented for this encounter    Age/Sex: 80 y o  female  Admission Orders:  OBSERVATION  TELE MON  NEURO  IS  OXYGEN  MRI  CONSULT NEURO  ECHO  Scheduled Medications:    Medications:  aspirin 81 mg Oral Daily   cefTRIAXone 1,000 mg Intravenous Q24H   docusate sodium 100 mg Oral BID   enoxaparin 40 mg Subcutaneous Daily   latanoprost 1 drop Right Eye HS multivitamin-minerals 1 tablet Oral Daily   polyethylene glycol 17 g Oral Daily   pravastatin 40 mg Oral Daily With Dinner   sodium chloride 1 drop Right Eye Daily   timolol 1 drop Right Eye Daily     Continuous IV Infusions:     PRN Meds:    nystatin  Topical BID PRN   ondansetron 4 mg Intravenous Q6H PRN   triamcinolone  Topical BID PRN       IP CONSULT TO PHYSICAL MEDICINE REHAB  IP CONSULT TO NEUROLOGY  IP CONSULT TO CASE MANAGEMENT  IP CONSULT TO NUTRITION SERVICES    Network Utilization Review Department  [email or web address]  ATTENTION: Please call with any questions or concerns to [phone] and carefully listen to the prompts so that you are directed to the right person  All voicemails are confidential   Sharyn Upton all requests for admission clinical reviews, approved or denied determinations and any other requests to dedicated fax number below belonging to the campus where the patient is receiving treatment   List of dedicated fax numbers for the Facilities:  [address] [place] [address] DENIALS (Administrative/Medical Necessity) [phone]   [address] (Maternity/NICU/Pediatrics) [phone]   Marion Courser [phone]   Sanket Samuels [phone]   Kevin Grit [phone]   [facility] [phone]   [address] [place] [address] [address] [place] [address] [phone]   [facility]  [phone]   [address] [facility]  [address] [address] [address] [facility] [phone]

## 2019-12-29 NOTE — ASSESSMENT & PLAN NOTE
· CT abdomen and pelvis shows constipation and fecal impaction  · Will continue on stool softener will monitor closely

## 2019-12-29 NOTE — UTILIZATION REVIEW
Initial Clinical Review    OBSERVATION 12/28/19 CONVERTED TO INPATIENT ADMISSION 12/29/19 FOR CONTINUE STOKE WORKUP AND UTI CONTINUE IV ABX    Admission: Date/Time/Statement:   Inpatient Admission Orders (From admission, onward)     Ordered        12/29/19 1215  Inpatient Admission  Once                   Orders Placed This Encounter   Procedures    Inpatient Admission     Standing Status:   Standing     Number of Occurrences:   1     Order Specific Question:   Admitting Physician     Answer:   Junette Homans     Order Specific Question:   Level of Care     Answer:   Med Surg [16]     Order Specific Question:   Bed request comments     Answer:   telemetry     Order Specific Question:   Estimated length of stay     Answer:   More than 2 Midnights     Order Specific Question:   Certification     Answer:   I certify that inpatient services are medically necessary for this patient for a duration of greater than two midnights  See H&P and MD Progress Notes for additional information about the patient's course of treatment  ED Arrival Information     Expected Arrival Acuity Means of Arrival Escorted By Service Admission Type    - 12/28/2019 09:53 Emergent Ambulance [facility] Ambulance Hospitalist Emergency    Arrival Complaint    stroke like symptoms        Chief Complaint   Patient presents with    STROKE Alert    Vomiting     Assessment/Plan: 80 y o  female who presents with sudden onset of weakness  The patient is a resident of the [facility] in [facility]  She was noted by the staff with a sudden onset of weakness during breakfast and had a large episodes of nonbloody vomiting  Upon arrival EMS, noted a left side facial droop  Upon arrival to the ED the patient appears to have no significant neurological deficit able to follow command appropriately  CT head and CTA head and neck were done which shows severe narrowing of the M1 segment of the right cerebral artery    The patient is somewhat a poor historian suspected underlying cognitive impairment and most of the information was obtained from medical record  Currently the patient denies any pain  She was unable to tell me what happened and why she is in the hospital today  She states that she just feeling weak  The patient uses wheelchair at the assisted living  * Stroke-like symptom  Assessment & Plan  · Patient had weakness and facial droop today  Weakness suspected to be exacerbated with UTI as well  · CT head and CTA head and neck reviewed   · CTA head/neck severe narrowing M1 segment of the right middle cerebral artery with diminished flow throughout the distal right middle cerebral artery   · Will admit patient under stroke pathway   · Will check MRI and echocardiogram   · Continue with ASA and statin   · Consult neurology   · PT/OT/speech      Other constipation  Assessment & Plan  · CT abdomen and pelvis shows constipation and fecal impaction  · Will start the patient on stool softener will monitor closely     Cognitive impairment  Assessment & Plan  · Continue supportive care      Acute cystitis without hematuria  Assessment & Plan  · Received levaquin x 1 in ED   · Pending urine culture   · Will start ceftriaxone on the floor      Essential hypertension  Assessment & Plan  · Monitor BP closely   · Will hold furosemide for now as she vomited at personal care home      PVD (peripheral vascular disease) ([facility] [address] )  Assessment & Plan  · Continue with ASA and statin   VTE Prophylaxis: Enoxaparin (Lovenox)  Code Status: DNR/DNI  POLST: There is no POLST form on file for this patient (pre-hospital)  Discussion with family: no family presented at bedside at the time of assessment      12/29/19      * Stroke-like symptom  Assessment & Plan  · Patient had weakness and facial droop today  Weakness suspected to be exacerbated with UTI as well     · CT head and CTA head and neck reviewed   · CTA head/neck severe narrowing M1 segment of the right middle cerebral artery with diminished flow throughout the distal right middle cerebral artery   · Patient was admitted under stroke pathway   · Pending MRI and echocardiogram   · Continue with ASA and statin   · Consult neurology   · PT/OT/speech   · Clinically is much improved- no focal neurological deficits      Other constipation  Assessment & Plan  · CT abdomen and pelvis shows constipation and fecal impaction  · Will continue on stool softener will monitor closely     Cognitive impairment  Assessment & Plan  · Continue supportive care         Acute cystitis without hematuria  Assessment & Plan  · Received levaquin x 1 in ED   · Urine- gram negative hamida enteric like- final result pending   · Will continue with ceftriaxone day #2     Essential hypertension  Assessment & Plan  · Monitor BP closely   · Will resume furosemide as she is able to tolerate PO intake         PVD (peripheral vascular disease) ([facility])  Assessment & Plan  · Continue with ASA and statin    Current Patient Status: Inpatient   Certification Statement: The patient will continue to require additional inpatient hospital stay due to stroke-like symptoms       ED Triage Vitals [12/28/19 1039]   Temperature Pulse Respirations Blood Pressure SpO2   (!) 95 2 °F (35 1 °C) 70 18 136/69 98 %      Temp Source Heart Rate Source Patient Position - Orthostatic VS BP Location FiO2 (%)   Tympanic Monitor Lying Left arm --      Pain Score       No Pain          Wt Readings from Last 1 Encounters:   12/28/19 85 kg (187 lb 5 oz)     Additional Vital Signs:   12/28/19 1900  97 3 °F (36 3 °C)Abnormal   74  16  145/70  99 %  Nasal cannula  Lying   12/28/19 1800  97 2 °F (36 2 °C)Abnormal   79  18  124/70  98 %  Nasal cannula  Lying   12/28/19 1700  97 2 °F (36 2 °C)Abnormal   64  18  121/67  96 %  Nasal cannula  Lying   12/28/19 1600  97 6 °F (36 4 °C)  62  18  124/68  99 %  Nasal cannula  Lying   12/28/19 1541          99 %  Nasal cannula     12/28/19 1531  97 1 °F (36 2 °C)Abnormal   58  16  125/66      Lying   12/28/19 1358  98 °F (36 7 °C)  76  18  144/98  96 %  Nasal cannula  Lying   12/28/19 1230    73  17  118/56  100 %       12/28/19 1200  97 8 °F (36 6 °C)  68  15  122/58  99 %         Pertinent Labs/Diagnostic Test Results:   CT HEAD NECK STROKE ALERT Severe narrowing of the M1 segment of the right middle cerebral artery with diminished flow throughout the distal right middle cerebral artery territory branches  cxr No acute cardiopulmonary disease  Ct HEAD Cerebral atrophy with extensive chronic small vessel ischemic white matter disease     If there is continued concern for acute infarction, a follow-up MRI of the brain with diffusion-weighted imaging should be obtained  Ct abdomen pelvis 1   Colonic diverticulosis  2   Mild constipation and fecal impaction  3   Cholelithiasis without CT evidence of acute cholecystitis  4   Hiatal hernia  5   Mild abnormal appearance of the urinary bladder   Although the findings may be due to under distention, correlate for cystitis/urinary tract infection    Results from last 7 days   Lab Units 12/29/19  0610 12/28/19  1037   WBC Thousand/uL 6 80 8 00   HEMOGLOBIN g/dL 14 3 15 0   HEMATOCRIT % 44 6 45 6   PLATELETS Thousands/uL 210 229         Results from last 7 days   Lab Units 12/29/19  0610 12/28/19  1036   SODIUM mmol/L 143 141   POTASSIUM mmol/L 3 8 4 0   CHLORIDE mmol/L 105 104   CO2 mmol/L 30 30   ANION GAP mmol/L 8 7   BUN mg/dL 12 14   CREATININE mg/dL 0 79 0 85   EGFR ml/min/1 73sq m 63 58   CALCIUM mg/dL 9 3 9 5   MAGNESIUM mg/dL 2 2  --          Results from last 7 days   Lab Units 12/28/19  0957   POC GLUCOSE mg/dl 164*     Results from last 7 days   Lab Units 12/29/19  0610 12/28/19  1036   GLUCOSE RANDOM mg/dL 78 144*       Results from last 7 days   Lab Units 12/28/19  1037   TROPONIN I ng/mL <0 03         Results from last 7 days   Lab Units 12/28/19  1037   PROTIME seconds 12 1   INR  1 04   PTT seconds 30     Results from last 7 days   Lab Units 12/28/19  1107   CLARITY UA  Turbid*   COLOR UA  Yellow   SPEC GRAV UA  1 010   PH UA  7 5   GLUCOSE UA mg/dl Negative   KETONES UA mg/dl Negative   BLOOD UA  1+*   PROTEIN UA mg/dl Trace*   NITRITE UA  Positive*   BILIRUBIN UA  Negative   UROBILINOGEN UA E U /dl 0 2   LEUKOCYTES UA  3+*   WBC UA /hpf Innumerable*   RBC UA /hpf Field obscured, unable to enumerate*   BACTERIA UA /hpf Moderate*   EPITHELIAL CELLS WET PREP /hpf Occasional     Results from last 7 days   Lab Units 12/28/19  1058   INFLUENZA A PCR  None Detected   RSV PCR  None Detected     Results from last 7 days   Lab Units 12/28/19  1107   URINE CULTURE  >100,000 cfu/ml Escherichia coli*               ED Treatment:   Medication Administration from 12/28/2019 0951 to 12/28/2019 1335       Date/Time Order Dose Route Action Action by Comments     12/28/2019 1058 ondansetron (ZOFRAN) injection 4 mg 4 mg Intravenous Given Steve Chen RN      12/28/2019 1018 iohexol (OMNIPAQUE) 350 MG/ML injection (SINGLE-DOSE) 85 mL 85 mL Intravenous Given [facility]      12/28/2019 1227 levofloxacin (LEVAQUIN) IVPB (premix) 750 mg 750 mg Intravenous New Bag Steve Chen RN         Past Medical History:   Diagnosis Date    Blind left eye     Fx of fibula     Hypertension      Present on Admission:   PVD (peripheral vascular disease) ([facility] [address] )   Essential hypertension      Admitting Diagnosis: No admission diagnoses are documented for this encounter    Age/Sex: 80 y o  female  Admission Orders:    TELE MON  NEURO  IS  OXYGEN  MRI  CONSULT NEURO  ECHO  Scheduled Medications:    Medications:  aspirin 81 mg Oral Daily   cefTRIAXone 1,000 mg Intravenous Q24H   docusate sodium 100 mg Oral BID   enoxaparin 40 mg Subcutaneous Daily   [START ON 12/30/2019] furosemide 60 mg Oral Daily   latanoprost 1 drop Right Eye HS   multivitamin-minerals 1 tablet Oral Daily   polyethylene glycol 17 g Oral Daily   pravastatin 40 mg Oral Daily With Dinner   sodium chloride 1 drop Right Eye Daily   timolol 1 drop Right Eye Daily     Continuous IV Infusions:     PRN Meds:    nystatin  Topical BID PRN   ondansetron 4 mg Intravenous Q6H PRN   triamcinolone  Topical BID PRN       IP CONSULT TO PHYSICAL MEDICINE REHAB  IP CONSULT TO NEUROLOGY  IP CONSULT TO CASE MANAGEMENT  IP CONSULT TO NUTRITION SERVICES    Network Utilization Review Department  [email or web address]  ATTENTION: Please call with any questions or concerns to [phone] and carefully listen to the prompts so that you are directed to the right person  All voicemails are confidential   Didi Durbin all requests for admission clinical reviews, approved or denied determinations and any other requests to dedicated fax number below belonging to the campus where the patient is receiving treatment   List of dedicated fax numbers for the Facilities:  [address] DENIALS (Administrative/Medical Necessity) [phone]   [address] (Maternity/NICU/Pediatrics) [phone]   Inna Tejada [phone]   [facility] [phone]   [facility] [phone]   [facility] [phone]   1205 [facility] 1525 [facility] [phone]   [facility] [phone]   [address] [facility] [address]  2401 [facility] 1000 [address] [facility] [phone]

## 2019-12-29 NOTE — PROGRESS NOTES
Progress Note - Dasha Parra 6/15/1923, 80 y o  female MRN: [id number]    Unit/Bed#: -01 Encounter: [id number]    Primary Care Provider: No primary care provider on file  Date and time admitted to hospital: 12/28/2019  9:58 AM        * Stroke-like symptom  Assessment & Plan  · Patient had weakness and facial droop today  Weakness suspected to be exacerbated with UTI as well  · CT head and CTA head and neck reviewed   · CTA head/neck severe narrowing M1 segment of the right middle cerebral artery with diminished flow throughout the distal right middle cerebral artery   · Patient was admitted under stroke pathway   · Pending MRI and echocardiogram   · Continue with ASA and statin   · Consult neurology   · PT/OT/speech   · Clinically is much improved- no focal neurological deficits     Other constipation  Assessment & Plan  · CT abdomen and pelvis shows constipation and fecal impaction  · Will continue on stool softener will monitor closely    Cognitive impairment  Assessment & Plan  · Continue supportive care       Acute cystitis without hematuria  Assessment & Plan  · Received levaquin x 1 in ED   · Urine- gram negative hamida enteric like- final result pending   · Will continue with ceftriaxone day #2    Essential hypertension  Assessment & Plan  · Monitor BP closely   · Will resume furosemide as she is able to tolerate PO intake       PVD (peripheral vascular disease) (HCC)  Assessment & Plan  · Continue with ASA and statin         VTE Pharmacologic Prophylaxis: Pharmacologic: Enoxaparin (Lovenox)    Patient Centered Rounds: I have performed bedside rounds with nursing staff today      Discussions with Specialists or Other Care Team Provider: nursing, PT/OT, Rx  Education and Discussions with Family / Patient: patient and son     Current Length of Stay: 0 day(s)    Current Patient Status: Inpatient   Certification Statement: The patient will continue to require additional inpatient hospital stay due to stroke-like symptoms  Discharge Plan: will change patient to inpatient  Pending ECHO and MRI brain  Additionally PT/OT recommended STR  Code Status: Level 1 - Full Code    Subjective:   Feeling better but continues to feel slightly weak  Appetite improves  Denies any pain  Objective:     Vitals:   Temp (24hrs), Av 5 °F (36 4 °C), Min:97 1 °F (36 2 °C), Max:98 °F (36 7 °C)    Temp:  [97 1 °F (36 2 °C)-98 °F (36 7 °C)] 97 8 °F (36 6 °C)  HR:  [58-87] 68  Resp:  [16-18] 18  BP: ()/(51-98) 117/54  SpO2:  [94 %-100 %] 97 %  Body mass index is 32 15 kg/m²  Input and Output Summary (last 24 hours): Intake/Output Summary (Last 24 hours) at 2019 1215  Last data filed at 2019 0906  Gross per 24 hour   Intake 340 ml   Output    Net 340 ml       Physical Exam:     Physical Exam   Constitutional: She appears well-developed  No distress  Frail      HENT:   Head: Normocephalic and atraumatic  Mouth/Throat: Oropharynx is clear and moist    Eyes: Pupils are equal, round, and reactive to light  Conjunctivae and EOM are normal    Neck: Normal range of motion  Neck supple  No thyromegaly present  Cardiovascular: Normal rate, regular rhythm and normal heart sounds  Exam reveals no gallop and no friction rub  No murmur heard  Pulmonary/Chest: Effort normal and breath sounds normal  She has no wheezes  She has no rales  Abdominal: Soft  Bowel sounds are normal  She exhibits no distension  There is no tenderness  There is no rebound  Musculoskeletal: Normal range of motion  She exhibits no edema, tenderness or deformity  Neurological: She is alert  No cranial nerve deficit  No facial droop, no dysarthria, strength equal in all extremities  With baseline forgetfulness  Skin: Skin is warm and dry  No rash noted  No erythema  Psychiatric: She has a normal mood and affect  Her behavior is normal  Thought content normal    Vitals reviewed        Additional Data: Labs:    Results from last 7 days   Lab Units 12/29/19  0610   WBC Thousand/uL 6 80   HEMOGLOBIN g/dL 14 3   HEMATOCRIT % 44 6   PLATELETS Thousands/uL 210     Results from last 7 days   Lab Units 12/29/19  0610   POTASSIUM mmol/L 3 8   CHLORIDE mmol/L 105   CO2 mmol/L 30   BUN mg/dL 12   CREATININE mg/dL 0 79   CALCIUM mg/dL 9 3     Results from last 7 days   Lab Units 12/28/19  1037   INR  1 04     Results from last 7 days   Lab Units 12/28/19  0957   POC GLUCOSE mg/dl 164*           * I Have Reviewed All Lab Data Listed Above  * Additional Pertinent Lab Tests Reviewed: [address] Admission  Reviewed    Imaging:  Imaging Reports Reviewed Today Include: n/a     Recent Cultures (last 7 days):     Results from last 7 days   Lab Units 12/28/19  1107   URINE CULTURE  >100,000 cfu/ml Gram Negative Steven Enteric Like*       Last 24 Hours Medication List:     Current Facility-Administered Medications:  aspirin 81 mg Oral Daily Lamond Pu, CRNP    cefTRIAXone 1,000 mg Intravenous Q24H Lamond Pu, CRNP Last Rate: 1,000 mg (12/29/19 0602)   docusate sodium 100 mg Oral BID Lamond Pu, CRNP    enoxaparin 40 mg Subcutaneous Daily Lamond Pu, CRNP    latanoprost 1 drop Right Eye HS Lamond Pu, CRNP    multivitamin-minerals 1 tablet Oral Daily Lamond Pu, CRNP    nystatin  Topical BID PRN Lamond Pu, CRNP    ondansetron 4 mg Intravenous Q6H PRN Lamond Pu, CRNP    polyethylene glycol 17 g Oral Daily Lamond Pu, CRNP    pravastatin 40 mg Oral Daily With Dinner Lamond Pu, CRNP    sodium chloride 1 drop Right Eye Daily Lamond Pu, CRNP    timolol 1 drop Right Eye Daily Lamond Pu, CRNP    triamcinolone  Topical BID PRN Lamond Pu, CRNP         Today, Patient Was Seen By: VALDEZ Flowers    ** Please Note: Dictation voice to text software may have been used in the creation of this document   **

## 2019-12-29 NOTE — PHYSICAL THERAPY NOTE
Physical Therapy Evaluation     Patient's Name: Sonja Monday    Admitting Diagnosis  No admission diagnoses are documented for this encounter  Problem List  Patient Active Problem List   Diagnosis    Hyperlipidemia    PVD (peripheral vascular disease) ([address] [address] )    Cough    Essential hypertension    Edema of both lower extremities due to peripheral venous insufficiency    Stroke-like symptom    Acute cystitis without hematuria    Cognitive impairment    Other constipation       Past Medical History  Past Medical History:   Diagnosis Date    Blind left eye     Fx of fibula     Hypertension        Past Surgical History  Past Surgical History:   Procedure Laterality Date    EYE SURGERY      HYSTERECTOMY      THYROID SURGERY          12/29/19 0808   Note Type   Note type Eval only   Pain Assessment   Pain Assessment 0-10   Pain Score 3   Pain Location Back  ("from laying")   Pain Orientation Upper   Home Living   Type of Home Assisted living   Home Layout Elevator;Ramped entrance   Bathroom Shower/Tub Walk-in shower   [address] [place] [address] chair   Home Equipment Wheelchair-manual;Walker   Prior Function   Level of [place] Needs assistance with ADLs and functional mobility  (Indep w/c propulsion, Assistance with transfers)   Lives With Facility staff   Receives Help From Personal care attendant   ADL Assistance Needs assistance  (Indep feeding)   IADLs Needs assistance   Falls in the last 6 months 0   Vocational Retired   Restrictions/Precautions   [facility] Bearing Precautions Per Order No   Other Precautions Fall Risk;Pain;O2;Telemetry;Multiple lines; Bed Alarm;Hard of hearing;Visual impairment   General   Family/Caregiver Present No   Cognition   Overall Cognitive Status WFL   Arousal/Participation Alert   Orientation Level Oriented X4   Following Commands Follows one step commands with increased time or repetition   Comments Pt agreeable to PT eval   RLE Assessment   RLE Assessment X Strength RLE   RLE Overall Strength 4-/5   LLE Assessment   LLE Assessment X   Strength LLE   LLE Overall Strength 4-/5   Bed Mobility   Supine to Sit 4  Minimal assistance   Additional items Assist x 1;Verbal cues; Bedrails;LE management   Sit to Supine 4  Minimal assistance   Additional items Assist x 1;Verbal cues; Bedrails;LE management   Transfers   Sit to Stand 4  Minimal assistance   Additional items Assist x 2;Verbal cues; Bedrails; Increased time required   Stand to Sit 4  Minimal assistance   Additional items Assist x 2;Verbal cues; Increased time required; Bedrails   Additional Comments pt stood x2 with Min Ax2 with RW and c/o dizziness that subsided the first instance with time (~10 seconds) and the second instance with sitting   Ambulation/Elevation   Gait Assistance Not tested   Stair Management Assistance Not tested   Balance   Static Sitting Fair +   Dynamic Sitting Fair   Static Standing Fair -   Dynamic Standing Poor +   Endurance Deficit   Endurance Deficit Yes   Endurance Deficit Description dizziness   Activity Tolerance   Activity Tolerance Treatment limited secondary to medical complications (Comment)  (dizziness)   Assessment   Prognosis Fair   Problem List Decreased strength; Impaired balance;Decreased endurance;Decreased mobility; Impaired vision; Impaired hearing;Pain   Assessment Pt is 80 y o  female seen for PT evaluation s/p admit to [address] [place] [address] on 12/28/2019 w/ Stroke-like symptom  PT consulted to assess pt's functional mobility and d/c needs  Order placed for PT eval and tx, w/ up as tolerated order  Comorbidities affecting pt's physical performance at time of assessment include: UTI, cognitive impairment and blindness  PTA, pt was requiring A for mobility, resident of [facility] and retired  Personal factors affecting pt at time of IE include: decreased cognition, hearing impairments and visual impairments   Please find objective findings from PT assessment regarding body systems outlined above with impairments and limitations including weakness, impaired balance, decreased endurance, pain, decreased activity tolerance, decreased functional mobility tolerance, decreased safety awareness, fall risk and decreased cognition  The following objective measures performed on IE also reveal limitations: Barthel Index: 35/100  Pt's clinical presentation is currently unstable/unpredictable seen in pt's presentation of confusion and recent facial drooping with weakness  Pt to benefit from continued PT tx to address deficits as defined above and maximize level of functional independent mobility and consistency  From PT/mobility standpoint, recommendation at time of d/c would be STR pending progress in order to facilitate return to PLOF  Barriers to Discharge Decreased caregiver support   Goals   Patient Goals to get better   LTG Expiration Date 01/05/20   Long Term Goal #1 Pt will: Perform bed mobility tasks to Supervision to improve ease of bed mobility  Perform transfers to Supervision to improve ease of transfers  Perform ambulation with RW and Min A for 10 ft to   decrease burden of care  Increase dynamic standing balance to F to decrease fall risk  Increase BLE strength to 4+/5 to improve functional mobility  Increase OOB activity tolerance to 10 minutes without s/s of exertion to decrease fall risk  PT Treatment Day 0   Plan   Treatment/Interventions Functional transfer training;LE strengthening/ROM; Therapeutic exercise; Endurance training;Bed mobility;Gait training   PT Frequency   (3-5x/wk)   Recommendation   Recommendation Short-term skilled PT   Equipment Recommended Wheelchair;Walker   PT - OK to Discharge No   Additional Comments upon conlusion pt in bed with SCDs in place and all needs in reach   Barthel Index   Feeding 5   Bathing 0   Grooming Score 5   Dressing Score 5   Bladder Score 5   Bowels Score 5   Toilet Use Score 5   Transfers (Bed/Chair) Score 5   Mobility (Level Surface) Score 0   Stairs Score 0   Barthel Index Score 35           Nadege wSeet, PT

## 2019-12-29 NOTE — PLAN OF CARE
Problem: OCCUPATIONAL THERAPY ADULT  Goal: Performs self-care activities at highest level of function for planned discharge setting  See evaluation for individualized goals  Description  Treatment Interventions: Activityengagement, ADL retraining, Functional transfer training, Endurance training, Patient/family training          See flowsheet documentation for full assessment, interventions and recommendations  Note:   Limitation: Decreased ADL status, Decreased endurance, Decreased self-care trans  Prognosis: Good  Assessment: Pt is a 80 y o  female seen for OT evaluation s/p admit to [address] on 12/28/2019 w/ Stroke-like symptom  Comorbidities affecting pt's functional performance at time of assessment include: HTN and Cognative Impairment, PVD, Acute Cystitis, see H & P for additional PMHx  Personal factors affecting pt at time of IE include:difficulty performing ADLS  Prior to admission, pt was reportedly I with self care ADL's and functional mobility/toileting, D for IADL's  Upon evaluation: Pt requires an increased level of assistance with self care ADL's, functional transfers/toileting/mobility r/t  the following deficits impacting occupational performance: weakness, decreased balance, decreased tolerance and decreased safety awareness  Pt to benefit from continued skilled OT tx while in the hospital to address deficits as defined above and maximize level of functional independence w ADL's and functional mobility  Occupational Performance areas to address include: grooming, bathing/shower, toilet hygiene, dressing and functional mobility   From OT standpoint, recommendation at time of d/c would be STR       OT Discharge Recommendation: Short Term Rehab  OT - OK to Discharge: No

## 2019-12-29 NOTE — PLAN OF CARE
Problem: Neurological Deficit  Goal: Neurological status is stable or improving  Description  Interventions:  - Monitor and assess patient's level of consciousness, motor function, sensory function, and level of assistance needed for ADLs  - Monitor and report changes from baseline  Collaborate with interdisciplinary team to initiate plan and implement interventions as ordered  - Provide and maintain a safe environment  - Consider fall precautions         Outcome: Progressing

## 2019-12-29 NOTE — SPEECH THERAPY NOTE
Consult received  Records reviewed & spoke with RN (pt passed RN Swallow screening)    Plan: evaluate within 24 hrs

## 2019-12-29 NOTE — ASSESSMENT & PLAN NOTE
· Received levaquin x 1 in ED   · Urine- gram negative hamida enteric like- final result pending   · Will continue with ceftriaxone day #2

## 2019-12-29 NOTE — OCCUPATIONAL THERAPY NOTE
Occupational Therapy Evaluation      Reggie Esteves    12/29/2019    Principal Problem:    Stroke-like symptom  Active Problems:    PVD (peripheral vascular disease) ([address] )    Essential hypertension    Acute cystitis without hematuria    Cognitive impairment    Other constipation      Past Medical History:   Diagnosis Date    Blind left eye     Fx of fibula     Hypertension        Past Surgical History:   Procedure Laterality Date    EYE SURGERY      HYSTERECTOMY      THYROID SURGERY          12/29/19 0809   Note Type   Note type Eval only   Restrictions/Precautions   Weight Bearing Precautions Per Order No   Other Precautions Fall Risk;Pain;O2;Multiple lines;Telemetry;Hard of hearing;Visual impairment  (Lt eye blindness)   Pain Assessment   Pain Assessment 0-10   Pain Score 3   Pain Location Back   Pain Orientation Upper   Home Living   Type of Home Assisted living   Home Layout Access; Elevator;Ramped entrance   Interactive Fate Grab bars in shower; Shower chair;Grab bars around toilet   [address]   Prior Function   Level of Midnight Needs assistance with ADLs and functional mobility  (Indep w/c propulsion, Assistance with transfers)   Lives With Facility staff   Receives Help From Personal care attendant   ADL Assistance Needs assistance  (Indep feeding)   IADLs Needs assistance   Falls in the last 6 months 0   Vocational Retired   Psychosocial   Psychosocial (WDL) WDL   ADL   Eating Assistance 5  Supervision/Setup   Grooming Assistance 5  Supervision/Setup   [address] 4  Minimal Assistance   LB [address] Unable to assess   [address] 3  Moderate Assistance    [address] Unable to assess   [address]  Unable to assess   Bed Mobility   Rolling R 4  Minimal assistance   Additional items Assist x 1; Increased time required;Verbal cues;Bedrails;LE management   Supine to Sit 4  Minimal assistance   Additional items Assist x 1; Increased time required;Verbal cues;LE management   Sit to Supine 4  Minimal assistance   Additional items Assist x 1; Increased time required;Verbal cues;LE management   Transfers   Sit to Stand 4  Minimal assistance   Additional items Assist x 2;Bedrails; Increased time required;Verbal cues   Stand to Sit 4  Minimal assistance   Additional items Assist x 2; Increased time required;Verbal cues; Bedrails   Additional Comments stood at bedside ~ 2', dizzines with positional change noted   Balance   Static Sitting Fair +   Dynamic Sitting Fair   Static Standing Fair -   Dynamic Standing Poor +   Activity Tolerance   Activity Tolerance Treatment limited secondary to medical complications (Comment)   Nurse Made Aware yes   RUE Assessment   RUE Assessment WFL  (UE's good for age)   LUE Assessment   LUE Assessment WFL   Hand Function   Gross Motor Coordination Functional   Fine Motor Coordination Functional   Cognition   Overall Cognitive Status WFL   Arousal/Participation Alert; Cooperative   Attention Within functional limits   Orientation Level Oriented X4   Memory Within functional limits   Following Commands Follows one step commands with increased time or repetition   Assessment   Limitation Decreased ADL status; Decreased endurance;Decreased self-care trans   Prognosis Good   Assessment Pt is a 80 y o  female seen for OT evaluation s/p admit to [facility] on 12/28/2019 w/ Stroke-like symptom  Comorbidities affecting pt's functional performance at time of assessment include: HTN and Cognative Impairment, PVD, Acute Cystitis, see H & P for additional PMHx  Personal factors affecting pt at time of IE include:difficulty performing ADLS  Prior to admission, pt was reportedly I with self care ADL's and functional mobility/toileting, D for IADL's   Upon evaluation: Pt requires an increased level of assistance with self care ADL's, functional transfers/toileting/mobility r/t  the following deficits impacting occupational performance: weakness, decreased balance, decreased tolerance and decreased safety awareness  Pt to benefit from continued skilled OT tx while in the hospital to address deficits as defined above and maximize level of functional independence w ADL's and functional mobility  Occupational Performance areas to address include: grooming, bathing/shower, toilet hygiene, dressing and functional mobility  From OT standpoint, recommendation at time of d/c would be STR     Goals   Patient Goals to feel better   STG Time Frame 3-5   Short Term Goal #1 increase UB self care ADL's to set-up/SBA   Short Term Goal #2 increase bed mobility to MI for self care participation   LTG Time Frame 7-10   Long Term Goal #1 increase LB self care to mod A   Long Term Goal #2 increase self toileting to Min A X 1 or less, with good safety demonstrated   Plan   Treatment Interventions Activityengagement;ADL retraining;Functional transfer training; Endurance training;Patient/family training   Goal Expiration Date 01/08/20   OT Treatment Day 0   OT Frequency 3-5x/wk   Recommendation   OT Discharge Recommendation Short Term Rehab   OT - OK to Discharge No   Barthel Index   Feeding 5   Bathing 0   Grooming Score 5   Dressing Score 5   Bladder Score 5   Bowels Score 5   Toilet Use Score 5   Transfers (Bed/Chair) Score 5   Mobility (Level Surface) Score 0   Stairs Score 0   Barthel Index Score 35   Anna Jacobson OT

## 2019-12-30 ENCOUNTER — APPOINTMENT (INPATIENT)
Dept: NON INVASIVE DIAGNOSTICS | Facility: HOSPITAL | Age: 84
DRG: 068 | End: 2019-12-30
Payer: MEDICARE

## 2019-12-30 ENCOUNTER — APPOINTMENT (INPATIENT)
Dept: MRI IMAGING | Facility: HOSPITAL | Age: 84
DRG: 068 | End: 2019-12-30
Payer: MEDICARE

## 2019-12-30 VITALS
OXYGEN SATURATION: 91 % | SYSTOLIC BLOOD PRESSURE: 98 MMHG | HEART RATE: 62 BPM | HEIGHT: 64 IN | BODY MASS INDEX: 31.98 KG/M2 | TEMPERATURE: 96.9 F | WEIGHT: 187.31 LBS | DIASTOLIC BLOOD PRESSURE: 59 MMHG | RESPIRATION RATE: 18 BRPM

## 2019-12-30 LAB
ATRIAL RATE: 70 BPM
BACTERIA UR CULT: ABNORMAL
P AXIS: 80 DEGREES
PR INTERVAL: 170 MS
QRS AXIS: -45 DEGREES
QRSD INTERVAL: 138 MS
QT INTERVAL: 418 MS
QTC INTERVAL: 451 MS
T WAVE AXIS: -25 DEGREES
VENTRICULAR RATE: 70 BPM

## 2019-12-30 PROCEDURE — 97110 THERAPEUTIC EXERCISES: CPT

## 2019-12-30 PROCEDURE — 70551 MRI BRAIN STEM W/O DYE: CPT

## 2019-12-30 PROCEDURE — 93010 ELECTROCARDIOGRAM REPORT: CPT | Performed by: INTERNAL MEDICINE

## 2019-12-30 PROCEDURE — 99239 HOSP IP/OBS DSCHRG MGMT >30: CPT | Performed by: HOSPITALIST

## 2019-12-30 PROCEDURE — 93306 TTE W/DOPPLER COMPLETE: CPT

## 2019-12-30 PROCEDURE — 97530 THERAPEUTIC ACTIVITIES: CPT

## 2019-12-30 PROCEDURE — 93306 TTE W/DOPPLER COMPLETE: CPT | Performed by: INTERNAL MEDICINE

## 2019-12-30 RX ORDER — DOXYCYCLINE HYCLATE 100 MG/1
100 CAPSULE ORAL EVERY 12 HOURS SCHEDULED
Qty: 6 CAPSULE | Refills: 0 | Status: SHIPPED | OUTPATIENT
Start: 2019-12-30 | End: 2020-01-02

## 2019-12-30 RX ORDER — DOXYCYCLINE HYCLATE 50 MG/1
100 CAPSULE ORAL EVERY 12 HOURS SCHEDULED
Status: DISCONTINUED | OUTPATIENT
Start: 2019-12-30 | End: 2019-12-30 | Stop reason: HOSPADM

## 2019-12-30 RX ORDER — PRAVASTATIN SODIUM 40 MG
40 TABLET ORAL
Qty: 30 TABLET | Refills: 0 | Status: SHIPPED | OUTPATIENT
Start: 2019-12-30 | End: 2020-01-21 | Stop reason: HOSPADM

## 2019-12-30 RX ADMIN — DOCUSATE SODIUM 100 MG: 100 CAPSULE, LIQUID FILLED ORAL at 11:11

## 2019-12-30 RX ADMIN — ASPIRIN 81 MG: 81 TABLET, COATED ORAL at 11:11

## 2019-12-30 RX ADMIN — Medication 1 TABLET: at 11:11

## 2019-12-30 RX ADMIN — TIMOLOL MALEATE 1 DROP: 5 SOLUTION/ DROPS OPHTHALMIC at 11:31

## 2019-12-30 RX ADMIN — POLYETHYLENE GLYCOL 3350 17 G: 17 POWDER, FOR SOLUTION ORAL at 11:11

## 2019-12-30 RX ADMIN — SODIUM CHLORIDE 1 DROP: 50 SOLUTION/ DROPS OPHTHALMIC at 11:31

## 2019-12-30 RX ADMIN — FUROSEMIDE 60 MG: 40 TABLET ORAL at 11:10

## 2019-12-30 RX ADMIN — CEFTRIAXONE 1000 MG: 1 INJECTION, SOLUTION INTRAVENOUS at 06:21

## 2019-12-30 RX ADMIN — ENOXAPARIN SODIUM 40 MG: 40 INJECTION SUBCUTANEOUS at 11:10

## 2019-12-30 NOTE — NUTRITION
12/30/19 1406   Assessment   Timepoint Initial  (consult )   Labs   List Completed Labs Other (Comment)  (12/29/19 HGBA1C 5 3%  Triglycerides 206 HDL 30  meds reviewed pravachol MVI lasix rocephin colace)   Feeding Route   PO With assist  (needs set up)   Swallow Other (Comment)  (patient has poor dentition on bottom jaw, has dental appointment to fix partial next week per son)   Adequacy of Intake   Nutrition Modality PO  (regular house diet)   Estimated calorie intake compared to estimated need patient had good appetite for lunch   Nutrition Prognosis   Nutrition Concerns Elderly; Other (Comment)  (poor dentition lower jaw, no edema per RN documentation, skin documentation reviewed)   Comorbid Concerns Other (Comment)  (HTN)   Nutrition Precautions None   Nutrition Considerations Other (Comment)  (education not appropriate patient advanced aged)   PES Statement   Problem Clinical   Functional (1) Biting/Chewing (masticatory) difficulty NC-1 2   Related to Partial/Complete Edentulous   As evidenced by: Per patient interview   Patient Nutrition Goals   Goal meet PO needs   Goal Status initiated   Timeframe to complete goal by d/c   Recommendations/Interventions   Summary Patient admitted with stroke like symptoms, per team meeting no acute infarct per MRI imaging  Patient has loose bottom dentures, not with her, speech recommending level 3 texture to improve intakes ease of chewing  No significant wt  change per family  Patient for d/c today  No new recommendations at this time  Malnutrition/BMI Present No   Interventions Diet: continued as ordered   Nutrition Recommendations No new recommendations   Nutrition Complexity Risk   Nutrition complexity level Sign off  (patient for d/c today)   Discontinue all nutrition reviews and/or follow ups for this patient?  Yes

## 2019-12-30 NOTE — DISCHARGE SUMMARY
Discharge- [facility] 6/15/1923, 80 y o  female MRN: [id number]    Unit/Bed#: -01 Encounter: [id number]    Primary Care Provider: No primary care provider on file  Date and time admitted to hospital: 12/28/2019  9:58 AM        * Stroke-like symptom  Assessment & Plan  · All symptoms resolved - current NIH stroke score scale is 0    · CT head and CTA head and neck reviewed   · CTA head/neck severe narrowing M1 segment of the right middle cerebral artery with diminished flow throughout the distal right middle cerebral artery   · MRI of the brain is grossly within limits  · Okay for discharge back to her skilled nursing facility  · Continue daily aspirin and Pravachol  · Neurology consultation canceled    Acute cystitis without hematuria  Assessment & Plan  · Patient has an E coli UTI  · Will discharge on a 3 day course of doxycycline  · Patient initially received quinolones which this infection is resistant to    Cognitive impairment  Assessment & Plan  · Continue supportive care       PVD (peripheral vascular disease) ([address] )  Assessment & Plan  · Continue with ASA and statin       Essential hypertension  Assessment & Plan  · Blood pressure is stable  · Discharge on pre-admit meds at pre-admit dosages      Other constipation  Assessment & Plan  · Resolved    Hyperlipidemia  Assessment & Plan  · Discharge home on statin          Discharging Physician / Practitioner: Ray Ramsey MD  PCP: No primary care provider on file    Admission Date:   Admission Orders (From admission, onward)     Ordered        12/29/19 1215  Inpatient Admission  Once         12/28/19 1210  Place in Observation (expected length of stay for this patient is less than two midnights)  Once                   Discharge Date: 12/30/19    Resolved Problems  Date Reviewed: 12/29/2019    None          Consultations During Hospital Stay:  · Curbside neurology      Procedures Performed:   · None    Significant Findings / Test Results:   · CT brain-stroke alert protocol-cerebral atrophy with extensive chronic small-vessel ischemic white matter disease  No acute pathology  · CTA head and neck-Severe narrowing of the M1 segment of the right middle cerebral artery with diminished flow throughout the distal right middle cerebral artery territory branches  · CT abdomen pelvis-1   Colonic diverticulosis  2   Mild constipation and fecal impaction  3   Cholelithiasis without CT evidence of acute cholecystitis  4   Hiatal hernia  5   Mild abnormal appearance of the urinary bladder   Although the findings may be due to under distention, correlate for cystitis/urinary tract infection  · Chest x-ray-no acute cardiopulmonary disease  · MRI brain-no mass effect, acute intercranial hemorrhage or evidence of recent infarction  Age-related involutional and severe chronic microvascular ischemic changes noted  Incidental Findings:   · None     Test Results Pending at Discharge (will require follow up): · None     Outpatient Tests Requested:  · None    Complications:     None    Reason for Admission:  Judah Zambrano out CVA    Hospital Course:     Arjun Shafer is a 80 y o  female patient who originally presented to the hospital on 12/28/2019 due to weakness and facial droop  Please refer to the initial history and physical examination completed by Parish Macedo for the initial presenting features and complaints  In brief, the patient is a 14-year-old female who was admitted to the hospital because of weakness and facial droop  In the emergency room a stroke alert was called  The CT brain and the CTA of the head and neck had the results as outlined above  She was admitted on the stroke pathway  She did well from a speech and swallow evaluation her  She also did well from a PT and OT evaluation  Initially a neurology consultation was sought    In view of the patient being completely asymptomatic with an NIH stroke scale score of 0, and a normal MRI a formal neurology consultation was canceled  Patient was diagnosed with an E coli UTI  Initially she had received levofloxacin, however the organism was resistant  She was deemed medically stable for discharge back to her skilled nursing facility on the afternoon of 12/30/2019  A prescription for doxycycline was given to the patient  Preliminarily, a 2D echocardiogram did not yield any acute pathology that would further warrant any type of inpatient workup  Patient's son, Peyton Colón, was brought up to [facility] at the time of discharge and the patient will follow up in the outpatient setting with her regular primary care physician  Please see above list of diagnoses and related plan for additional information  Condition at Discharge: good     Discharge Day Visit / Exam:     Subjective:  Patient seen and examined, is a little hard of hearing, no new complaints no distress  Vitals: Blood Pressure: 98/59 (12/30/19 1100)  Pulse: 62 (12/30/19 1100)  Temperature: (!) 96 9 °F (36 1 °C) (12/30/19 1100)  Temp Source: Temporal (12/30/19 1100)  Respirations: 18 (12/30/19 1100)  Height: 5' 4" (162 6 cm) (12/28/19 1358)  Weight - Scale: 85 kg (187 lb 5 oz) (12/28/19 1358)  SpO2: 91 % (12/30/19 1100)  Exam:   Physical Exam   Constitutional: She is oriented to person, place, and time  She appears well-developed and well-nourished  HENT:   Head: Normocephalic and atraumatic  Nose: Nose normal    Mouth/Throat: Oropharynx is clear and moist    Eyes: Pupils are equal, round, and reactive to light  Conjunctivae and EOM are normal    Neck: Normal range of motion  Neck supple  No JVD present  No thyromegaly present  Cardiovascular: Normal rate, regular rhythm and intact distal pulses  Exam reveals no gallop and no friction rub  No murmur heard  Pulmonary/Chest: Effort normal and breath sounds normal  No respiratory distress  Abdominal: Soft  Bowel sounds are normal  She exhibits no distension and no mass  There is no tenderness  There is no guarding  Musculoskeletal: Normal range of motion  She exhibits no edema  Lymphadenopathy:     She has no cervical adenopathy  Neurological: She is alert and oriented to person, place, and time  No cranial nerve deficit  Skin: Skin is warm  No rash noted  No erythema  Psychiatric: She has a normal mood and affect  Her behavior is normal    Vitals reviewed  Discussion with Family:  Patient's son Delilah Patel, was brought up to par with the plan of care at time of discharge at phone #[phone], all questions answered to his satisfaction    Discharge instructions/Information to patient and family:   See after visit summary for information provided to patient and family  Provisions for Follow-Up Care:  See after visit summary for information related to follow-up care and any pertinent home health orders  Disposition:     Assisted Living Facility at [facility]    For Discharges to [facility]:   · Not Applicable to this Patient - Not Applicable to this Patient    Planned Readmission:    None     Discharge Statement:  I spent 40 minutes discharging the patient  This time was spent on the day of discharge  I had direct contact with the patient on the day of discharge  Greater than 50% of the total time was spent examining patient, answering all patient questions, arranging and discussing plan of care with patient as well as directly providing post-discharge instructions  Additional time then spent on discharge activities  Discharge Medications:  See after visit summary for reconciled discharge medications provided to patient and family        ** Please Note: This note has been constructed using a voice recognition system **

## 2019-12-30 NOTE — ASSESSMENT & PLAN NOTE
· Patient has an E coli UTI  · Will discharge on a 3 day course of doxycycline  · Patient initially received quinolones which this infection is resistant to

## 2019-12-30 NOTE — PLAN OF CARE
Problem: PHYSICAL THERAPY ADULT  Goal: Performs mobility at highest level of function for planned discharge setting  See evaluation for individualized goals  Description  Treatment/Interventions: Functional transfer training, LE strengthening/ROM, Therapeutic exercise, Endurance training, Bed mobility, Gait training  Equipment Recommended: Wheelchair, Sarahi Eid       See flowsheet documentation for full assessment, interventions and recommendations  Outcome: Progressing  Note:   Prognosis: Fair  Problem List: Decreased strength, Decreased endurance, Impaired balance, Decreased mobility, Impaired hearing, Impaired vision  Assessment: Pt  found resting in bed but agreeable to participate in PT treatment  Pt  performed bed mobility tasks including rolling to L sinde and then to R side with min Ax1 and the use of BSR's to get all the way over onto her side  Pt  then transfered from supine to sit with min Ax1 with BLE management to get to the EOB with B feet touching the floor  Pt  did not have and reports of dizziness today not reports of pain throughout the session  Pt  then transfered from sit to stand with minAx2 and performed SPS with min Ax2 with RW into stationary chair at bedside  Pt  was then assisted into the chair with mon Ax2 with cues to reach back for the arms of the chair prior to sitting  Pt  was positioned for comfort  Pt  then performed ther ex on BLE as per flow sheet  SCD's were then re-attached and pt  was left with all needs in reach  Pt  tolerated treatment well but would benefit from STR to help improve her functional mobility to return safely to her prior living environment  Continue current POC and progress as tolerarted  Barriers to Discharge: Decreased caregiver support     Recommendation: Short-term skilled PT     PT - OK to Discharge: Yes    See flowsheet documentation for full assessment     Chiquita Ramirez PTA

## 2019-12-30 NOTE — OCCUPATIONAL THERAPY NOTE
Occupational Therapy/Physical Therapy Cancellation Note     Chart review performed  At this time, OT/PT treatment session cancelled due to patient leaving room for MRI  OT/PT will follow and treat as appropriate        Toñito Shukla MS, OTR/L

## 2019-12-30 NOTE — SPEECH THERAPY NOTE
Pt is awake alert and oreinted, order for evaluation d/t stroke protocol  Lower dentures not at bedside so pt w/ mild difficulty breaking down harder solids, such as ayala  , otherwise tolerating regular a diet  Would suggest consideration to change diet to dysphagia 3 textures, until lower dentures are bedside  No formal evaluations indicated at this time  This is a screen only  Order acknowledged  William Shepherd  [place], [place], CCC/SLP  [id number]  [email or web address]  Available via tiger text

## 2019-12-30 NOTE — PLAN OF CARE
Problem: Neurological Deficit  Goal: Neurological status is stable or improving  Description  Interventions:  - Monitor and assess patient's level of consciousness, motor function, sensory function, and level of assistance needed for ADLs  - Monitor and report changes from baseline  Collaborate with interdisciplinary team to initiate plan and implement interventions as ordered  - Provide and maintain a safe environment  - Consider fall precautions         12/30/2019 1436 by Kiki Deleon, RN  Outcome: Progressing  12/30/2019 1436 by Kiki Deleon RN  Outcome: Progressing

## 2019-12-30 NOTE — SOCIAL WORK
Visit with patient in her hospital room to initiate discharge planning  Patient lives at assisted living at [facility]  Same called and patient able to return on discharge  Will need wheelchair [facility] transport  Spoke with son in her room earlier today  Patient is wheelchair dependent  Needs assistance to transfer  Patient discharged today, arrangements made with [facility] to transport her back at 1500  Patient signed IMM, denies questions  Son notified and denies questions  [facility]

## 2019-12-30 NOTE — NURSING NOTE
Patient discharged "[facility]"  IV removed  Verbalizes understanding of discharge instructions  Patient has all belongings   Escorted by wheelchair Danielle Patel

## 2019-12-30 NOTE — PHYSICAL THERAPY NOTE
12/30/19 1239   Pain Assessment   Pain Assessment No/denies pain   Pain Score No Pain   Restrictions/Precautions   Weight Bearing Precautions Per Order No   Other Precautions Fall Risk;Hard of hearing;Visual impairment   General   Chart Reviewed Yes   Response to Previous Treatment Patient with no complaints from previous session  Family/Caregiver Present No   Cognition   Overall Cognitive Status WFL   Arousal/Participation Alert; Cooperative   Attention Within functional limits   Orientation Level Oriented X4   Memory Within functional limits   Following Commands Follows one step commands without difficulty   Subjective   Subjective "I'd like to get up out of bed into the chair "   Bed Mobility   Rolling R 4  Minimal assistance   Additional items Assist x 1;Bedrails; Increased time required;Verbal cues   Rolling L 4  Minimal assistance   Additional items Assist x 1;Bedrails; Increased time required;Verbal cues   Supine to Sit 4  Minimal assistance   Additional items Assist x 1;HOB elevated; Increased time required; Bedrails;Verbal cues;LE management   Transfers   Sit to Stand 4  Minimal assistance   Additional items Assist x 2; Increased time required;Verbal cues; Bedrails   Stand to Sit 4  Minimal assistance   Additional items Assist x 1; Armrests; Increased time required; Impulsive   Stand pivot 4  Minimal assistance   Additional items Assist x 2;Armrests; Increased time required;Verbal cues   Balance   Static Sitting Fair +   Dynamic Sitting Fair   Static Standing Fair -   Dynamic Standing Poor +   Endurance Deficit   Endurance Deficit Yes   Activity Tolerance   Activity Tolerance Patient limited by fatigue   Exercises   Quad Sets Sitting;15 reps;AROM; Bilateral   Glute Sets Sitting;15 reps;AROM; Bilateral   Hip Flexion Sitting;15 reps;AROM; Bilateral   Hip Abduction Sitting;15 reps;AROM; Bilateral   Hip Adduction Sitting;15 reps;AROM; Bilateral   Knee AROM Long Arc Quad Sitting;15 reps;AROM; Bilateral   Ankle Pumps Sitting;15 reps;AROM; Bilateral   Assessment   Prognosis Fair   Problem List Decreased strength;Decreased endurance; Impaired balance;Decreased mobility; Impaired hearing; Impaired vision   Assessment Pt  found resting in bed but agreeable to participate in PT treatment  Pt  performed bed mobility tasks including rolling to L sinde and then to R side with min Ax1 and the use of BSR's to get all the way over onto her side  Pt  then transfered from supine to sit with min Ax1 with BLE management to get to the EOB with B feet touching the floor  Pt  did not have and reports of dizziness today not reports of pain throughout the session  Pt  then transfered from sit to stand with minAx2 and performed SPS with min Ax2 with RW into stationary chair at bedside  Pt  was then assisted into the chair with mon Ax2 with cues to reach back for the arms of the chair prior to sitting  Pt  was positioned for comfort  Pt  then performed ther ex on BLE as per flow sheet  SCD's were then re-attached and pt  was left with all needs in reach  Pt  tolerated treatment well but would benefit from STR to help improve her functional mobility to return safely to her prior living environment  Continue current POC and progress as tolerarted  Goals   Patient Goals to feel better   PT Treatment Day 1   Plan   Treatment/Interventions Functional transfer training;LE strengthening/ROM; Therapeutic exercise; Endurance training;Patient/family training;Bed mobility;Gait training   Progress Progressing toward goals   PT Frequency Other (Comment)  (3-5x/wk)   Recommendation   Recommendation Short-term skilled PT   Equipment Recommended Walker; Wheelchair   PT - OK to Discharge Yes   Additional Comments once medically stable   Shane Zuleta, EUFEMIA

## 2019-12-30 NOTE — ASSESSMENT & PLAN NOTE
· All symptoms resolved - current NIH stroke score scale is 0    · CT head and CTA head and neck reviewed   · CTA head/neck severe narrowing M1 segment of the right middle cerebral artery with diminished flow throughout the distal right middle cerebral artery   · MRI of the brain is grossly within limits  · Okay for discharge back to her skilled nursing facility  · Continue daily aspirin and Pravachol  · Neurology consultation canceled

## 2020-01-08 NOTE — PHYSICIAN ADVISOR
Current patient class: Inpatient  The patient is currently on Hospital Day: 3 at [address]      The patient was admitted to the hospital at 12:15 PM on 12/29/19 for the following diagnosis:  No admission diagnoses are documented for this encounter  There is documentation in the medical record of an expected length of stay of at least 2 midnights  The patient is therefore expected to satisfy the 2 midnight benchmark and given the 2 midnight presumption is appropriate for INPATIENT ADMISSION  Given this expectation of a satisfying stay, CMS instructs us that the patient is most often appropriate for inpatient admission under part A provided medical necessity is documented in the chart  After review of the relevant documentation, labs, vital signs and test results, the patient is appropriate for INPATIENT ADMISSION  Admission to the hospital as an inpatient is a complex decision making process which requires the practitioner to consider the patients presenting complaint, history and physical examination and all relevant testing  With this in mind, in this case, the patient was deemed appropriate for INPATIENT ADMISSION  After review of the documentation and testing available at the time of the admission I concur with this clinical determination of medical necessity  Rationale is as follows: The patient is a 80 yrs old Female who presented to the ED at 12/28/2019  9:58 AM with a chief complaint of STROKE Alert and Vomiting     The patient was admitted with stroke-like symptoms, constipation, acute cystitis and cognitive impairment  The plan of care include pathway, MRI, echocardiogram, aspirin and statin neurology consultation, urine culture, intravenous antibiotics  This patient is appropriate for inpatient admission; continued hospitalization is necessary to ensure stabilization of her clinical status      The patients vitals on arrival were ED Triage Vitals [12/28/19 1039]   Temperature Pulse Respirations Blood Pressure SpO2   (!) 95 2 °F (35 1 °C) 70 18 136/69 98 %      Temp Source Heart Rate Source Patient Position - Orthostatic VS BP Location FiO2 (%)   Tympanic Monitor Lying Left arm --      Pain Score       No Pain           Past Medical History:   Diagnosis Date    Blind left eye     Fx of fibula     Hypertension      Past Surgical History:   Procedure Laterality Date    EYE SURGERY      HYSTERECTOMY      THYROID SURGERY             Consults have been placed to:   IP CONSULT TO NUTRITION SERVICES    Vitals:    12/29/19 2300 12/30/19 0300 12/30/19 0700 12/30/19 1100   BP: 97/53 (!) 101/49 110/52 98/59   BP Location: Other (Comment) Right arm Left arm Left arm   Pulse: 78 68 80 62   Resp: 20 20 18 18   Temp: 98 6 °F (37 °C) (!) 97 3 °F (36 3 °C) (!) 96 9 °F (36 1 °C) (!) 96 9 °F (36 1 °C)   TempSrc: Temporal Temporal Tympanic Temporal   SpO2: 96% 91% 94% 91%   Weight:       Height:           Most recent labs:    No results for input(s): WBC, HGB, HCT, PLT, K, NA, CALCIUM, BUN, CREATININE, LIPASE, AMYLASE, INR, TROPONINI, CKTOTAL, AST, ALT, ALKPHOS, BILITOT in the last 72 hours  Scheduled Meds:  Continuous Infusions:  No current facility-administered medications for this encounter  PRN Meds:      Surgical procedures (if appropriate):

## 2020-01-17 ENCOUNTER — HOSPITAL ENCOUNTER (INPATIENT)
Facility: HOSPITAL | Age: 85
LOS: 3 days | Discharge: NURSING, REHAB OR LONG-TERM CARE | DRG: 312 | End: 2020-01-21
Attending: EMERGENCY MEDICINE | Admitting: HOSPITALIST
Payer: MEDICARE

## 2020-01-17 ENCOUNTER — APPOINTMENT (EMERGENCY)
Dept: CT IMAGING | Facility: HOSPITAL | Age: 85
DRG: 312 | End: 2020-01-17
Payer: MEDICARE

## 2020-01-17 ENCOUNTER — APPOINTMENT (EMERGENCY)
Dept: RADIOLOGY | Facility: HOSPITAL | Age: 85
DRG: 312 | End: 2020-01-17
Payer: MEDICARE

## 2020-01-17 DIAGNOSIS — Z76.0 MEDICATION REFILL: ICD-10-CM

## 2020-01-17 DIAGNOSIS — R55 VASOVAGAL SYNCOPE: Primary | ICD-10-CM

## 2020-01-17 DIAGNOSIS — R77.8 ELEVATED TROPONIN: ICD-10-CM

## 2020-01-17 DIAGNOSIS — R55 CONVULSIVE SYNCOPE: ICD-10-CM

## 2020-01-17 DIAGNOSIS — I87.2 EDEMA OF BOTH LOWER EXTREMITIES DUE TO PERIPHERAL VENOUS INSUFFICIENCY: Chronic | ICD-10-CM

## 2020-01-17 PROBLEM — R79.89 ELEVATED TROPONIN: Status: ACTIVE | Noted: 2020-01-17

## 2020-01-17 LAB
ALBUMIN SERPL BCP-MCNC: 3.6 G/DL (ref 3.5–5.7)
ALP SERPL-CCNC: 94 U/L (ref 55–165)
ALT SERPL W P-5'-P-CCNC: 8 U/L (ref 7–52)
ANION GAP SERPL CALCULATED.3IONS-SCNC: 8 MMOL/L (ref 4–13)
AST SERPL W P-5'-P-CCNC: 14 U/L (ref 13–39)
ATRIAL RATE: 61 BPM
BASOPHILS # BLD AUTO: 0 THOUSANDS/ΜL (ref 0–0.1)
BASOPHILS NFR BLD AUTO: 1 % (ref 0–2)
BILIRUB SERPL-MCNC: 0.4 MG/DL (ref 0.2–1)
BILIRUB UR QL STRIP: NEGATIVE
BUN SERPL-MCNC: 17 MG/DL (ref 7–25)
CALCIUM SERPL-MCNC: 9.1 MG/DL (ref 8.6–10.5)
CHLORIDE SERPL-SCNC: 107 MMOL/L (ref 98–107)
CLARITY UR: CLEAR
CO2 SERPL-SCNC: 27 MMOL/L (ref 21–31)
COLOR UR: YELLOW
CREAT SERPL-MCNC: 0.77 MG/DL (ref 0.6–1.2)
EOSINOPHIL # BLD AUTO: 0.3 THOUSAND/ΜL (ref 0–0.61)
EOSINOPHIL NFR BLD AUTO: 4 % (ref 0–5)
ERYTHROCYTE [DISTWIDTH] IN BLOOD BY AUTOMATED COUNT: 13.7 % (ref 11.5–14.5)
GFR SERPL CREATININE-BSD FRML MDRD: 65 ML/MIN/1.73SQ M
GLUCOSE SERPL-MCNC: 169 MG/DL (ref 65–99)
GLUCOSE UR STRIP-MCNC: NEGATIVE MG/DL
HCT VFR BLD AUTO: 44.4 % (ref 42–47)
HGB BLD-MCNC: 14.3 G/DL (ref 12–16)
HGB UR QL STRIP.AUTO: NEGATIVE
KETONES UR STRIP-MCNC: NEGATIVE MG/DL
LEUKOCYTE ESTERASE UR QL STRIP: NEGATIVE
LYMPHOCYTES # BLD AUTO: 1.2 THOUSANDS/ΜL (ref 0.6–4.47)
LYMPHOCYTES NFR BLD AUTO: 15 % (ref 21–51)
MCH RBC QN AUTO: 30.1 PG (ref 26–34)
MCHC RBC AUTO-ENTMCNC: 32.2 G/DL (ref 31–37)
MCV RBC AUTO: 94 FL (ref 81–99)
MONOCYTES # BLD AUTO: 0.5 THOUSAND/ΜL (ref 0.17–1.22)
MONOCYTES NFR BLD AUTO: 6 % (ref 2–12)
NEUTROPHILS # BLD AUTO: 5.5 THOUSANDS/ΜL (ref 1.4–6.5)
NEUTS SEG NFR BLD AUTO: 73 % (ref 42–75)
NITRITE UR QL STRIP: NEGATIVE
P AXIS: 59 DEGREES
PH UR STRIP.AUTO: 6.5 [PH]
PLATELET # BLD AUTO: 208 THOUSANDS/UL (ref 149–390)
PMV BLD AUTO: 8.9 FL (ref 8.6–11.7)
POTASSIUM SERPL-SCNC: 4.2 MMOL/L (ref 3.5–5.5)
PR INTERVAL: 152 MS
PROT SERPL-MCNC: 5.9 G/DL (ref 6.4–8.9)
PROT UR STRIP-MCNC: NEGATIVE MG/DL
QRS AXIS: -35 DEGREES
QRSD INTERVAL: 136 MS
QT INTERVAL: 448 MS
QTC INTERVAL: 450 MS
RBC # BLD AUTO: 4.75 MILLION/UL (ref 3.9–5.2)
SODIUM SERPL-SCNC: 142 MMOL/L (ref 134–143)
SP GR UR STRIP.AUTO: 1.01 (ref 1–1.03)
T WAVE AXIS: -17 DEGREES
TROPONIN I SERPL-MCNC: 0.07 NG/ML
UROBILINOGEN UR QL STRIP.AUTO: 0.2 E.U./DL
VENTRICULAR RATE: 61 BPM
WBC # BLD AUTO: 7.5 THOUSAND/UL (ref 4.8–10.8)

## 2020-01-17 PROCEDURE — 99285 EMERGENCY DEPT VISIT HI MDM: CPT

## 2020-01-17 PROCEDURE — 36415 COLL VENOUS BLD VENIPUNCTURE: CPT | Performed by: EMERGENCY MEDICINE

## 2020-01-17 PROCEDURE — 85025 COMPLETE CBC W/AUTO DIFF WBC: CPT | Performed by: EMERGENCY MEDICINE

## 2020-01-17 PROCEDURE — 81003 URINALYSIS AUTO W/O SCOPE: CPT | Performed by: EMERGENCY MEDICINE

## 2020-01-17 PROCEDURE — 80053 COMPREHEN METABOLIC PANEL: CPT | Performed by: EMERGENCY MEDICINE

## 2020-01-17 PROCEDURE — 84484 ASSAY OF TROPONIN QUANT: CPT | Performed by: EMERGENCY MEDICINE

## 2020-01-17 PROCEDURE — 84484 ASSAY OF TROPONIN QUANT: CPT | Performed by: NURSE PRACTITIONER

## 2020-01-17 PROCEDURE — 99220 PR INITIAL OBSERVATION CARE/DAY 70 MINUTES: CPT | Performed by: NURSE PRACTITIONER

## 2020-01-17 PROCEDURE — 93010 ELECTROCARDIOGRAM REPORT: CPT | Performed by: INTERNAL MEDICINE

## 2020-01-17 PROCEDURE — 99285 EMERGENCY DEPT VISIT HI MDM: CPT | Performed by: EMERGENCY MEDICINE

## 2020-01-17 PROCEDURE — 93005 ELECTROCARDIOGRAM TRACING: CPT

## 2020-01-17 PROCEDURE — 70450 CT HEAD/BRAIN W/O DYE: CPT

## 2020-01-17 PROCEDURE — 1123F ACP DISCUSS/DSCN MKR DOCD: CPT | Performed by: INTERNAL MEDICINE

## 2020-01-17 PROCEDURE — 71045 X-RAY EXAM CHEST 1 VIEW: CPT

## 2020-01-17 RX ORDER — SODIUM CHLORIDE 9 MG/ML
75 INJECTION, SOLUTION INTRAVENOUS CONTINUOUS
Status: DISCONTINUED | OUTPATIENT
Start: 2020-01-17 | End: 2020-01-18

## 2020-01-17 RX ORDER — TRIAMCINOLONE ACETONIDE 1 MG/G
CREAM TOPICAL 2 TIMES DAILY PRN
Status: DISCONTINUED | OUTPATIENT
Start: 2020-01-17 | End: 2020-01-21 | Stop reason: HOSPADM

## 2020-01-17 RX ORDER — GLYCERIN/PROPYLENE GLYCOL 0.6 %-0.6%
1 DROPPERETTE, SINGLE-USE DROP DISPENSER OPHTHALMIC (EYE) DAILY
Status: DISCONTINUED | OUTPATIENT
Start: 2020-01-17 | End: 2020-01-21 | Stop reason: HOSPADM

## 2020-01-17 RX ORDER — ACETAMINOPHEN 325 MG/1
650 TABLET ORAL EVERY 6 HOURS PRN
Status: DISCONTINUED | OUTPATIENT
Start: 2020-01-17 | End: 2020-01-21 | Stop reason: HOSPADM

## 2020-01-17 RX ORDER — TIMOLOL MALEATE 5 MG/ML
1 SOLUTION/ DROPS OPHTHALMIC DAILY
Status: DISCONTINUED | OUTPATIENT
Start: 2020-01-17 | End: 2020-01-21 | Stop reason: HOSPADM

## 2020-01-17 RX ORDER — ASPIRIN 81 MG/1
81 TABLET, CHEWABLE ORAL DAILY
Status: DISCONTINUED | OUTPATIENT
Start: 2020-01-17 | End: 2020-01-21 | Stop reason: HOSPADM

## 2020-01-17 RX ORDER — LATANOPROST 50 UG/ML
1 SOLUTION/ DROPS OPHTHALMIC
Status: DISCONTINUED | OUTPATIENT
Start: 2020-01-17 | End: 2020-01-21 | Stop reason: HOSPADM

## 2020-01-17 RX ORDER — DOCUSATE SODIUM 100 MG/1
100 CAPSULE, LIQUID FILLED ORAL 2 TIMES DAILY
Status: DISCONTINUED | OUTPATIENT
Start: 2020-01-17 | End: 2020-01-21 | Stop reason: HOSPADM

## 2020-01-17 RX ORDER — PRAVASTATIN SODIUM 40 MG
40 TABLET ORAL
Status: DISCONTINUED | OUTPATIENT
Start: 2020-01-17 | End: 2020-01-21 | Stop reason: HOSPADM

## 2020-01-17 RX ADMIN — SODIUM CHLORIDE 250 ML: 0.9 INJECTION, SOLUTION INTRAVENOUS at 10:38

## 2020-01-17 RX ADMIN — DOCUSATE SODIUM 100 MG: 100 CAPSULE, LIQUID FILLED ORAL at 15:14

## 2020-01-17 RX ADMIN — LATANOPROST 1 DROP: 50 SOLUTION OPHTHALMIC at 21:35

## 2020-01-17 RX ADMIN — ENOXAPARIN SODIUM 40 MG: 40 INJECTION SUBCUTANEOUS at 15:14

## 2020-01-17 RX ADMIN — PRAVASTATIN SODIUM 40 MG: 40 TABLET ORAL at 15:37

## 2020-01-17 RX ADMIN — TIMOLOL MALEATE 1 DROP: 5 SOLUTION OPHTHALMIC at 18:03

## 2020-01-17 RX ADMIN — Medication 1 TABLET: at 15:15

## 2020-01-17 RX ADMIN — ASPIRIN 81 MG 81 MG: 81 TABLET ORAL at 15:14

## 2020-01-17 RX ADMIN — Medication 1 APPLICATION: at 18:03

## 2020-01-17 RX ADMIN — DOCUSATE SODIUM 100 MG: 100 CAPSULE, LIQUID FILLED ORAL at 18:03

## 2020-01-17 RX ADMIN — SODIUM CHLORIDE 75 ML/HR: 9 INJECTION, SOLUTION INTRAVENOUS at 15:15

## 2020-01-17 NOTE — H&P
H&P- Vladimir Reddy 6/15/1923, 80 y o  female MRN: [id number]    Unit/Bed#: -02 Encounter: [id number]    Primary Care Provider: No primary care provider on file  Date and time admitted to hospital: 1/17/2020 10:22 AM        * Syncope  Assessment & Plan  · Likely vasovagal response from having a bowel movement versus orthostatic hypotension  · Will check orthostatic hypotension  · The patient presented recently with stroke-like symptom and CTA head and neck 12/28 shows 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 with diminished flow throughout the distal right middle cerebral artery territory branches  Internal carotid arteries without significant stenosis  · Echocardiogram 12/30 shows LVEF 60% with grade 1 diastolic dysfunction   · Fall precautions   · Compression stockings for BLEs edema     Elevated troponin  Assessment & Plan  · Likely due to increase demand from syncopal episode   · Rule out ACS   · Patient is asymptomatic   · Will consult cardiology   · Trend troponin    Cognitive impairment  Assessment & Plan  · Continue supportive care     Essential hypertension  Assessment & Plan  · Blood pressure is well controlled  · Not on any medication except furosemide which the patient is taking both for lower extremity edema as well  · Will hold off on furosemide at this time while getting IV fluid- resume in the a m  VTE Prophylaxis: Enoxaparin (Lovenox)  Code Status: Full code   POLST: POLST is not applicable to this patient  Discussion with family: son    Anticipated Length of Stay:  Patient will be admitted on an Observation basis with an anticipated length of stay of  < 2 midnights  Justification for Hospital Stay: syncope     Chief Complaint:   Syncope    History of Present Illness:    Vladimir Reddy is a 80 y o  female who presents with syncope  The patient is a resident the [facility]  She has been feeling in her normal state of health until this a m   When she was in the bathroom having diarrhea  She states that when she tried to get up she was feeling lightheadedness and dizziness  She does not remember whether she has lost consciousness  Per ED staff, the patient was noted to have shaking all over her body with "passing out" while on the toilet  Unclear whether the patient did have a true syncope  Subsequently, the patient was brought into the ED for further evaluation  The patient was seen examined by me in her room with her son at bedside  Currently she denies any lightheadedness, dizziness, chest pain or palpitation  She states feeling much improved currently  She is eating lunch with good appetite  Review of Systems:  Review of Systems   Constitutional: Negative for activity change, appetite change, chills, diaphoresis and fever  HENT: Negative for congestion, ear pain, hearing loss, tinnitus and trouble swallowing  Eyes: Negative for photophobia, pain, discharge, itching and visual disturbance  Respiratory: Negative for cough, shortness of breath, wheezing and stridor  Cardiovascular: Negative for chest pain, palpitations and leg swelling  Gastrointestinal: Positive for diarrhea  Negative for abdominal pain, blood in stool, constipation, nausea and vomiting  Endocrine: Negative for cold intolerance, heat intolerance, polydipsia, polyphagia and polyuria  Genitourinary: Negative for difficulty urinating, dysuria, frequency, hematuria and urgency  Musculoskeletal: Negative for back pain, gait problem and neck stiffness  Skin: Negative for pallor, rash and wound  Allergic/Immunologic: Negative for environmental allergies, food allergies and immunocompromised state  Neurological: Negative for dizziness, tremors, speech difficulty, weakness (generalized weakness), light-headedness, numbness and headaches  Hematological: Negative for adenopathy  Does not bruise/bleed easily     Psychiatric/Behavioral: Negative for confusion, hallucinations and sleep disturbance  Past Medical and Surgical History:   Past Medical History:   Diagnosis Date    Blind left eye     Fx of fibula     Hypertension        Past Surgical History:   Procedure Laterality Date    EYE SURGERY      HYSTERECTOMY      THYROID SURGERY         Meds/Allergies:  Prior to Admission medications    Medication Sig Start Date End Date Taking?  Authorizing Provider   Acetaminophen (APAP) 325 MG tablet Take 650 mg by mouth every 4 (four) hours as needed for mild pain    Historical Provider, MD   aspirin 81 MG tablet Take 81 mg by mouth daily    Historical Provider, MD   Coenzyme Q10 (CO Q 10) 100 MG CAPS Take by mouth daily     Historical Provider, MD   dextromethorphan-guaiFENesin (ROBITUSSIN DM)  mg/5 mL syrup Take 5 mL by mouth 3 (three) times a day as needed for cough 5/27/19   Faisal Mercado MD   Difluprednate (DUREZOL) 0 05 % EMUL Apply to eye Install 1 drop into right eye every morning    Historical Provider, MD   furosemide (LASIX) 40 mg tablet TAKE ONE TABLET BY MOUTH ONCE DAILY  Patient taking differently: 60 mg Take 1 5 tab 7/22/19   VALDEZ Cruz   latanoprost (XALATAN) 0 005 % ophthalmic solution Administer 1 drop to the right eye daily at bedtime     Historical Provider, MD   loperamide (IMODIUM) 2 mg capsule Take 2 mg by mouth 4 (four) times a day as needed for diarrhea    Historical Provider, MD   Multiple Vitamin (MULTIVITAMINS PO) Take by mouth daily    Historical Provider, MD   nystatin (MYCOSTATIN) powder Apply topically 2 (two) times a day as needed    Historical Provider, MD   pravastatin (PRAVACHOL) 40 mg tablet Take 1 tablet (40 mg total) by mouth daily with dinner 12/30/19   Emiliano Bender MD   simvastatin (ZOCOR) 20 mg tablet Take 20 mg by mouth daily at bedtime     Historical Provider, MD   sodium chloride (IRISH 128) 5 % hypertonic ophthalmic ointment Administer 1 drop to the right eye daily     Historical Provider, MD   timolol (BETIMOL) 0 5 % ophthalmic solution Administer 1 drop to the right eye daily    Historical Provider, MD   triamcinolone (KENALOG) 0 1 % cream Apply topically 2 (two) times a day  Patient taking differently: Apply topically 2 (two) times a day as needed  2/8/19   VALDEZ Meek     I have reveiwed home medications using records provided by [facility]  Allergies: No Known Allergies    Social History:  Marital Status:    Occupation: retired   Patient Pre-hospital Living Situation: assisted living   Patient Pre-hospital Level of Mobility: assisted   Patient Pre-hospital Diet Restrictions:  none  Substance Use History:   Social History     Substance and Sexual Activity   Alcohol Use Never    Frequency: Never    Binge frequency: Never     Social History     Tobacco Use   Smoking Status Never Smoker   Smokeless Tobacco Never Used     Social History     Substance and Sexual Activity   Drug Use No       Family History:  I have reviewed the patients family history    Physical Exam:   Vitals:   Blood Pressure: 116/51 (01/17/20 1321)  Pulse: 63 (01/17/20 1321)  Temperature: (!) 97 °F (36 1 °C) (01/17/20 1321)  Temp Source: Temporal (01/17/20 1321)  Respirations: 18 (01/17/20 1321)  Weight - Scale: 83 4 kg (183 lb 13 8 oz) (01/17/20 1321)  SpO2: 92 % (01/17/20 1321)    Physical Exam   Constitutional: She is oriented to person, place, and time  She appears well-developed and well-nourished  No distress  HENT:   Head: Normocephalic and atraumatic  Mouth/Throat: Oropharynx is clear and moist    Eyes: Pupils are equal, round, and reactive to light  Conjunctivae and EOM are normal    Neck: Normal range of motion  Neck supple  No thyromegaly present  Cardiovascular: Normal rate, regular rhythm and normal heart sounds  Exam reveals no gallop and no friction rub  No murmur heard  Pulmonary/Chest: Effort normal and breath sounds normal  She has no wheezes  She has no rales  Abdominal: Soft   Bowel sounds are normal  She exhibits no distension  There is no tenderness  There is no rebound  Musculoskeletal: Normal range of motion  She exhibits edema (+1-2 BLEs)  She exhibits no tenderness or deformity  Neurological: She is alert and oriented to person, place, and time  No cranial nerve deficit  Skin: Skin is warm and dry  No rash noted  No erythema  Psychiatric: She has a normal mood and affect  Her behavior is normal  Thought content normal    Vitals reviewed  Additional Data:   Lab Results: I have personally reviewed pertinent reports  Results from last 7 days   Lab Units 01/17/20  1038   WBC Thousand/uL 7 50   HEMOGLOBIN g/dL 14 3   HEMATOCRIT % 44 4   PLATELETS Thousands/uL 208   NEUTROS PCT % 73   LYMPHS PCT % 15*   MONOS PCT % 6   EOS PCT % 4     Results from last 7 days   Lab Units 01/17/20  1037   POTASSIUM mmol/L 4 2   CHLORIDE mmol/L 107   CO2 mmol/L 27   BUN mg/dL 17   CREATININE mg/dL 0 77   CALCIUM mg/dL 9 1   ALK PHOS U/L 94   ALT U/L 8   AST U/L 14                   Imaging: I have personally reviewed pertinent reports  XR chest 1 view   Final Result by Adrienne Mena DO (01/17 1120)      Trace left pleural effusion  Workstation performed: [id number]         CT head without contrast   Final Result by Stefani Stuart MD (01/17 1055)      No acute intracranial abnormality  Microangiopathic changes  Workstation performed: [id number]             EKG, Pathology, and Other Studies Reviewed on Admission:   · EKG: NSR HR 61 with RBBB    NetAccess / Epic Records Reviewed: Yes     ** Please Note: This note has been constructed using a voice recognition system   **

## 2020-01-17 NOTE — ASSESSMENT & PLAN NOTE
· Likely vasovagal response from having a bowel movement versus orthostatic hypotension  · Will check orthostatic hypotension  · The patient presented recently with stroke-like symptom and CTA head and neck 12/28 shows 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 with diminished flow throughout the distal right middle cerebral artery territory branches  Internal carotid arteries without significant stenosis     · Echocardiogram 12/30 shows LVEF 60% with grade 1 diastolic dysfunction   · Fall precautions   · Compression stockings for BLEs edema

## 2020-01-17 NOTE — ASSESSMENT & PLAN NOTE
· Likely due to increase demand from syncopal episode   · Rule out ACS   · Patient is asymptomatic   · Will consult cardiology   · Trend troponin

## 2020-01-17 NOTE — ED PROVIDER NOTES
Emergency Department Note    Encounter Date 1/17/2020    Diagnosis  1  Vasovagal syncope    2  Convulsive syncope    3  Elevated troponin        MDM  Number of Diagnoses or Management Options  Convulsive syncope:   Elevated troponin:   Vasovagal syncope:   Diagnosis management comments: Presents with vasovagal syncope  Afebrile  VSS  Exam reveals nontoxic appearing patient in no acute distress who is at her neurologic and psychiatric baseline with no concerning findings  Labs reveal troponin 0 07 otherwise no clinically significant abnormality  UA unremarkable  ECG reveals normal sinus rhythm, LAD, RBBB, inverted T-waves leads 3, AVF and V3 representing no significant change from previous and no acute ischemic changes  Diagnostic imaging reveals no acute intracranial process on a background of chronic changes with trace left pleural effusion no other acute pulmonary process  Clinical suspicion that troponin elevation is ischemic is very low at the time of this report  Discussed with Cardiology on-call who agrees and is happy to see this patient in consult as requested  Gave small bolus of IV fluids  Patient remained complaint flea in the emergency department  Discussed with hospitalist who is happy to admit for observation  CC  Chief Complaint   Patient presents with    Syncope     Diarrhea       PMH significant for left eye blindness and HTN from nursing home via EMS with report of shaking in seizure-like activity with passing out while on the toilet just PTA complains of gradual onset lightheadedness and nauseousness when having a bowel movement then passing out PTA  Denies fever, rash, joint pain/swelling, headache, vision changes, hearing changes, chest pain, shortness of breath, abdominal pain and changes in bladder habits  History  No current facility-administered medications for this encounter       Current Outpatient Medications:     Acetaminophen (APAP) 325 MG tablet, Take 650 mg by mouth every 4 (four) hours as needed for mild pain, Disp: , Rfl:     aspirin 81 MG tablet, Take 81 mg by mouth daily, Disp: , Rfl:     Coenzyme Q10 (CO Q 10) 100 MG CAPS, Take by mouth daily , Disp: , Rfl:     dextromethorphan-guaiFENesin (ROBITUSSIN DM)  mg/5 mL syrup, Take 5 mL by mouth 3 (three) times a day as needed for cough, Disp: 118 mL, Rfl: 0    Difluprednate (DUREZOL) 0 05 % EMUL, Apply to eye Install 1 drop into right eye every morning, Disp: , Rfl:     furosemide (LASIX) 40 mg tablet, TAKE ONE TABLET BY MOUTH ONCE DAILY (Patient taking differently: 60 mg Take 1 5 tab), Disp: 90 tablet, Rfl: 2    latanoprost (XALATAN) 0 005 % ophthalmic solution, Administer 1 drop to the right eye daily at bedtime , Disp: , Rfl:     loperamide (IMODIUM) 2 mg capsule, Take 2 mg by mouth 4 (four) times a day as needed for diarrhea, Disp: , Rfl:     Multiple Vitamin (MULTIVITAMINS PO), Take by mouth daily, Disp: , Rfl:     nystatin (MYCOSTATIN) powder, Apply topically 2 (two) times a day as needed, Disp: , Rfl:     pravastatin (PRAVACHOL) 40 mg tablet, Take 1 tablet (40 mg total) by mouth daily with dinner, Disp: 30 tablet, Rfl: 0    simvastatin (ZOCOR) 20 mg tablet, Take 20 mg by mouth daily at bedtime , Disp: , Rfl:     sodium chloride (IRISH 128) 5 % hypertonic ophthalmic ointment, Administer 1 drop to the right eye daily , Disp: , Rfl:     timolol (BETIMOL) 0 5 % ophthalmic solution, Administer 1 drop to the right eye daily, Disp: , Rfl:     triamcinolone (KENALOG) 0 1 % cream, Apply topically 2 (two) times a day (Patient taking differently: Apply topically 2 (two) times a day as needed ), Disp: 30 g, Rfl: 1     Past Medical History:   Diagnosis Date    Blind left eye     Fx of fibula     Hypertension        Past Surgical History:   Procedure Laterality Date    EYE SURGERY      HYSTERECTOMY      THYROID SURGERY         Family History   Family history unknown: Yes        Social History Socioeconomic History    Marital status:      Spouse name: Not on file    Number of children: Not on file    Years of education: Not on file    Highest education level: Not on file   Occupational History    Not on file   Social Needs    Financial resource strain: Not on file    Food insecurity:     Worry: Not on file     Inability: Not on file    Transportation needs:     Medical: Not on file     Non-medical: Not on file   Tobacco Use    Smoking status: Never Smoker    Smokeless tobacco: Never Used   Substance and Sexual Activity    Alcohol use: Never     Frequency: Never     Binge frequency: Never    Drug use: No    Sexual activity: Not on file   Lifestyle    Physical activity:     Days per week: Not on file     Minutes per session: Not on file    Stress: Not on file   Relationships    Social connections:     Talks on phone: Not on file     Gets together: Not on file     Attends Confucianist service: Not on file     Active member of club or organization: Not on file     Attends meetings of clubs or organizations: Not on file     Relationship status: Not on file    Intimate partner violence:     Fear of current or ex partner: Not on file     Emotionally abused: Not on file     Physically abused: Not on file     Forced sexual activity: Not on file   Other Topics Concern    Not on file   Social History Narrative    Not on file       Review of Systems   All other systems reviewed and are negative  Vital Signs  Vitals:    01/17/20 1025 01/17/20 1129   BP: 133/63 107/61   TempSrc: Temporal    Pulse: 60 62   Resp: 18 18   Patient Position - Orthostatic VS: Sitting Sitting   Temp: (!) 96 6 °F (35 9 °C)        Physical Exam   Constitutional: She appears well-developed and well-nourished  HENT:   Head: Normocephalic and atraumatic  Eyes: EOM are normal    Left eye haziness consistent with known blindness   Neck: Normal range of motion  Neck supple     Cardiovascular: Normal rate and regular rhythm  Pulmonary/Chest: Effort normal and breath sounds normal    Abdominal: Soft  There is no tenderness  Musculoskeletal: Normal range of motion  She exhibits no deformity  Neurological: She is alert  No focal deficit   Skin: Skin is warm and dry  Psychiatric: She has a normal mood and affect  Her behavior is normal    Nursing note and vitals reviewed         ED Medications  Medications   sodium chloride 0 9 % bolus 250 mL (0 mL Intravenous Stopped 1/17/20 1128)       Labs  Labs Reviewed   CBC AND DIFFERENTIAL - Abnormal       Result Value Ref Range Status    WBC 7 50  4 80 - 10 80 Thousand/uL Final    RBC 4 75  3 90 - 5 20 Million/uL Final    Hemoglobin 14 3  12 0 - 16 0 g/dL Final    Hematocrit 44 4  42 0 - 47 0 % Final    MCV 94  81 - 99 fL Final    MCH 30 1  26 0 - 34 0 pg Final    MCHC 32 2  31 0 - 37 0 g/dL Final    RDW 13 7  11 5 - 14 5 % Final    MPV 8 9  8 6 - 11 7 fL Final    Platelets 814  454 - 390 Thousands/uL Final    Neutrophils Relative 73  42 - 75 % Final    Lymphocytes Relative 15 (*) 21 - 51 % Final    Monocytes Relative 6  2 - 12 % Final    Eosinophils Relative 4  0 - 5 % Final    Basophils Relative 1  0 - 2 % Final    Neutrophils Absolute 5 50  1 40 - 6 50 Thousands/µL Final    Lymphocytes Absolute 1 20  0 60 - 4 47 Thousands/µL Final    Monocytes Absolute 0 50  0 17 - 1 22 Thousand/µL Final    Eosinophils Absolute 0 30  0 00 - 0 61 Thousand/µL Final    Basophils Absolute 0 00  0 00 - 0 10 Thousands/µL Final   COMPREHENSIVE METABOLIC PANEL - Abnormal    Sodium 142  134 - 143 mmol/L Final    Potassium 4 2  3 5 - 5 5 mmol/L Final    Chloride 107  98 - 107 mmol/L Final    CO2 27  21 - 31 mmol/L Final    ANION GAP 8  4 - 13 mmol/L Final    BUN 17  7 - 25 mg/dL Final    Creatinine 0 77  0 60 - 1 20 mg/dL Final    Comment: Standardized to IDMS reference method    Glucose 169 (*) 65 - 99 mg/dL Final    Comment:   If the patient is fasting, the ADA then defines impaired fasting glucose as > 100 mg/dL and diabetes as > or equal to 123 mg/dL  Specimen collection should occur prior to Sulfasalazine administration due to the potential for falsely depressed results  Specimen collection should occur prior to Sulfapyridine administration due to the potential for falsely elevated results  Calcium 9 1  8 6 - 10 5 mg/dL Final    AST 14  13 - 39 U/L Final    Comment:   Specimen collection should occur prior to Sulfasalazine administration due to the potential for falsely depressed results  ALT 8  7 - 52 U/L Final    Comment:   Specimen collection should occur prior to Sulfasalazine administration due to the potential for falsely depressed results       Alkaline Phosphatase 94  55 - 165 U/L Final    Total Protein 5 9 (*) 6 4 - 8 9 g/dL Final    Albumin 3 6  3 5 - 5 7 g/dL Final    Total Bilirubin 0 40  0 20 - 1 00 mg/dL Final    eGFR 65  ml/min/1 73sq m Final    Narrative:     [place] guidelines for Chronic Kidney Disease (CKD):     Stage 1 with normal or high GFR (GFR > 90 mL/min/1 73 square meters)    Stage 2 Mild CKD (GFR = 60-89 mL/min/1 73 square meters)    Stage 3A Moderate CKD (GFR = 45-59 mL/min/1 73 square meters)    Stage 3B Moderate CKD (GFR = 30-44 mL/min/1 73 square meters)    Stage 4 Severe CKD (GFR = 15-29 mL/min/1 73 square meters)    Stage 5 End Stage CKD (GFR <15 mL/min/1 73 square meters)  Note: GFR calculation is accurate only with a steady state creatinine   TROPONIN I - Abnormal    Troponin I 0 07 (*) <=0 03 ng/mL Final    Comment:   Results indicate test should be repeated on new specimen collected within 4-6 hours of the original   UA W REFLEX TO MICROSCOPIC WITH REFLEX TO CULTURE - Normal    Color, UA Yellow  Yellow, Straw Final    Clarity, UA Clear  Hazy, Clear Final    Specific Gravity, UA 1 015  >1 005-<1 030 Final    pH, UA 6 5  5 0, 5 5, 6 0, 6 5, 7 0, 7 5 Final    Leukocytes, UA Negative  Negative Final    Nitrite, UA Negative  Negative Final Protein, UA Negative  Negative, Interference- unable to analyze mg/dl Final    Glucose, UA Negative  Negative mg/dl Final    Ketones, UA Negative  Negative mg/dl Final    Urobilinogen, UA 0 2  0 2, 1 0 E U /dl E U /dl Final    Bilirubin, UA Negative  Negative Final    Blood, UA Negative  Negative Final        ECG  Normal sinus rhythm, LAD, RBBB, inverted T-waves leads 3, AVF and V3 representing no significant change from previous and no acute ischemic changes  Imaging  XR chest 1 view   Final Result by Alo Hogan DO (01/17 1120)      Trace left pleural effusion  Workstation performed: [id number]         CT head without contrast   Final Result by Twyla Gottlieb MD (01/17 4404)      No acute intracranial abnormality  Microangiopathic changes  Workstation performed: [id number]             Procedures   None     ED Course        Disposition  Time reflects when diagnosis was documented in both MDM as applicable and the Disposition within this note     Time User Action Codes Description Comment    1/17/2020 11:51 AM Laneiana Hole Add [R55] Vasovagal syncope     1/17/2020 11:51 AM Laneiana Hole Add [R55] Convulsive syncope     1/17/2020 11:51 AM Daniala Quin Add [R79 89] Elevated troponin       ED Disposition     ED Disposition Condition Date/Time Comment    Admit Stable Fri Jan 17, 2020 11:57 AM Case was discussed with hospitalist and the patient's admission status was agreed to be Admission Status: observation status to the service of Dr Montez Krueger           Follow-up Information    None          ED Provider  Electronically signed by:     Martha Randall DO  01/17/20 1200

## 2020-01-17 NOTE — ASSESSMENT & PLAN NOTE
· Blood pressure is well controlled  · Not on any medication except furosemide which the patient is taking both for lower extremity edema as well  · Will hold off on furosemide at this time while getting IV fluid- resume in the a m

## 2020-01-18 LAB
ALBUMIN SERPL BCP-MCNC: 3.7 G/DL (ref 3.5–5.7)
ALP SERPL-CCNC: 100 U/L (ref 55–165)
ALT SERPL W P-5'-P-CCNC: 8 U/L (ref 7–52)
ANION GAP SERPL CALCULATED.3IONS-SCNC: 5 MMOL/L (ref 4–13)
AST SERPL W P-5'-P-CCNC: 11 U/L (ref 13–39)
BASOPHILS # BLD AUTO: 0.1 THOUSANDS/ΜL (ref 0–0.1)
BASOPHILS NFR BLD AUTO: 1 % (ref 0–2)
BILIRUB SERPL-MCNC: 0.5 MG/DL (ref 0.2–1)
BUN SERPL-MCNC: 12 MG/DL (ref 7–25)
CALCIUM SERPL-MCNC: 9 MG/DL (ref 8.6–10.5)
CHLORIDE SERPL-SCNC: 106 MMOL/L (ref 98–107)
CO2 SERPL-SCNC: 30 MMOL/L (ref 21–31)
CREAT SERPL-MCNC: 0.64 MG/DL (ref 0.6–1.2)
EOSINOPHIL # BLD AUTO: 0.2 THOUSAND/ΜL (ref 0–0.61)
EOSINOPHIL NFR BLD AUTO: 3 % (ref 0–5)
ERYTHROCYTE [DISTWIDTH] IN BLOOD BY AUTOMATED COUNT: 13.4 % (ref 11.5–14.5)
GFR SERPL CREATININE-BSD FRML MDRD: 76 ML/MIN/1.73SQ M
GLUCOSE SERPL-MCNC: 92 MG/DL (ref 65–99)
HCT VFR BLD AUTO: 43.9 % (ref 42–47)
HGB BLD-MCNC: 14.5 G/DL (ref 12–16)
LYMPHOCYTES # BLD AUTO: 1.7 THOUSANDS/ΜL (ref 0.6–4.47)
LYMPHOCYTES NFR BLD AUTO: 19 % (ref 21–51)
MAGNESIUM SERPL-MCNC: 2.1 MG/DL (ref 1.9–2.7)
MCH RBC QN AUTO: 30.4 PG (ref 26–34)
MCHC RBC AUTO-ENTMCNC: 33 G/DL (ref 31–37)
MCV RBC AUTO: 92 FL (ref 81–99)
MONOCYTES # BLD AUTO: 0.7 THOUSAND/ΜL (ref 0.17–1.22)
MONOCYTES NFR BLD AUTO: 8 % (ref 2–12)
NEUTROPHILS # BLD AUTO: 6.4 THOUSANDS/ΜL (ref 1.4–6.5)
NEUTS SEG NFR BLD AUTO: 70 % (ref 42–75)
PLATELET # BLD AUTO: 225 THOUSANDS/UL (ref 149–390)
PMV BLD AUTO: 9.2 FL (ref 8.6–11.7)
POTASSIUM SERPL-SCNC: 3.6 MMOL/L (ref 3.5–5.5)
PROT SERPL-MCNC: 5.8 G/DL (ref 6.4–8.9)
RBC # BLD AUTO: 4.75 MILLION/UL (ref 3.9–5.2)
SODIUM SERPL-SCNC: 141 MMOL/L (ref 134–143)
WBC # BLD AUTO: 9.1 THOUSAND/UL (ref 4.8–10.8)

## 2020-01-18 PROCEDURE — 80053 COMPREHEN METABOLIC PANEL: CPT | Performed by: NURSE PRACTITIONER

## 2020-01-18 PROCEDURE — 97167 OT EVAL HIGH COMPLEX 60 MIN: CPT

## 2020-01-18 PROCEDURE — 85025 COMPLETE CBC W/AUTO DIFF WBC: CPT | Performed by: NURSE PRACTITIONER

## 2020-01-18 PROCEDURE — 93005 ELECTROCARDIOGRAM TRACING: CPT

## 2020-01-18 PROCEDURE — 97530 THERAPEUTIC ACTIVITIES: CPT

## 2020-01-18 PROCEDURE — 99222 1ST HOSP IP/OBS MODERATE 55: CPT | Performed by: INTERNAL MEDICINE

## 2020-01-18 PROCEDURE — 99232 SBSQ HOSP IP/OBS MODERATE 35: CPT | Performed by: NURSE PRACTITIONER

## 2020-01-18 PROCEDURE — 97163 PT EVAL HIGH COMPLEX 45 MIN: CPT

## 2020-01-18 PROCEDURE — 83735 ASSAY OF MAGNESIUM: CPT | Performed by: NURSE PRACTITIONER

## 2020-01-18 RX ORDER — FUROSEMIDE 40 MG/1
40 TABLET ORAL DAILY
Status: DISCONTINUED | OUTPATIENT
Start: 2020-01-19 | End: 2020-01-19

## 2020-01-18 RX ADMIN — TIMOLOL MALEATE 1 DROP: 5 SOLUTION OPHTHALMIC at 09:32

## 2020-01-18 RX ADMIN — Medication 1 APPLICATION: at 09:32

## 2020-01-18 RX ADMIN — DOCUSATE SODIUM 100 MG: 100 CAPSULE, LIQUID FILLED ORAL at 09:32

## 2020-01-18 RX ADMIN — LATANOPROST 1 DROP: 50 SOLUTION OPHTHALMIC at 21:48

## 2020-01-18 RX ADMIN — Medication 1 TABLET: at 09:32

## 2020-01-18 RX ADMIN — DOCUSATE SODIUM 100 MG: 100 CAPSULE, LIQUID FILLED ORAL at 17:57

## 2020-01-18 RX ADMIN — PRAVASTATIN SODIUM 40 MG: 40 TABLET ORAL at 15:45

## 2020-01-18 RX ADMIN — ENOXAPARIN SODIUM 40 MG: 40 INJECTION SUBCUTANEOUS at 09:31

## 2020-01-18 RX ADMIN — ASPIRIN 81 MG 81 MG: 81 TABLET ORAL at 09:32

## 2020-01-18 NOTE — ASSESSMENT & PLAN NOTE
· Likely due to increase demand from syncopal episode   · Troponin- 0 07, 0 07, 0 07  · EKG shows no ischemic changes  · ACS ruled out   · Cardiology input appreciated   · Patient is asymptomatic

## 2020-01-18 NOTE — UTILIZATION REVIEW
Initial Clinical Review  OBSERVATION 1/17/20 CONVERTED TO INPATIENT 1/18/20 @1414 DUE TO SYNCOPAL EPISODE    01/18/20 1415  Inpatient Admission Once     Transfer Service: General Medicine       Question Answer Comment   Admitting Physician Sandi FRIEDMAN    Level of Care Med Surg    Estimated length of stay More than 2 Midnights    Certification I certify that inpatient services are medically necessary for this patient for a duration of greater than two midnights  See H&P and MD Progress Notes for additional information about the patient's course of treatment  01/18/20 1414       ED Arrival Information     Expected Arrival Acuity Means of Arrival Escorted By Service Admission Type    - 1/17/2020 10:16 Urgent Ambulance [facility] General Medicine Urgent    Arrival Complaint    diarrhea        Chief Complaint   Patient presents with    Syncope     Diarrhea     Assessment/Plan: 80year old female to the ED from nursing home via EMS with complaints of syncope  Admitted under observation for syncope, elevated troponin  Patient had gradual onset lightheadedness and nausea when having a bowel movement, then she had a syncopal episode  Has been having diarrhea  ECG reveals normal sinus rhythm, LAD, RBBB, inverted T-waves leads 3, AVF and V3 representing no significant change from previous and no acute ischemic changes  Syncope  Assessment & Plan  · Likely vasovagal response from having a bowel movement versus orthostatic hypotension  · Will check orthostatic hypotension  · The patient presented recently with stroke-like symptom and CTA head and neck 12/28 shows 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 with diminished flow throughout the distal right middle cerebral artery territory branches  Internal carotid arteries without significant stenosis     · Echocardiogram 12/30 shows LVEF 60% with grade 1 diastolic dysfunction   · Fall precautions   · Compression stockings for BLEs edema    Elevated troponin  Assessment & Plan  · Likely due to increase demand from syncopal episode   · Rule out ACS   · Patient is asymptomatic   · Will consult cardiology   · Trend troponin     Cognitive impairment  Assessment & Plan  · Continue supportive care      Essential hypertension  Assessment & Plan  · Blood pressure is well controlled  · Not on any medication except furosemide which the patient is taking both for lower extremity edema as well  · Will hold off on furosemide at this time while getting IV fluid- resume in the a m      ED Triage Vitals [01/17/20 1025]   Temperature Pulse Respirations Blood Pressure SpO2   (!) 96 6 °F (35 9 °C) 60 18 133/63 94 %      Temp Source Heart Rate Source Patient Position - Orthostatic VS BP Location FiO2 (%)   Temporal Monitor Sitting Left arm --      Pain Score       No Pain        Wt Readings from Last 1 Encounters:   01/17/20 83 4 kg (183 lb 13 8 oz)     Additional Vital Signs:   Date/Time  Temp  Pulse  Resp  BP  SpO2  O2 Device  Patient Position - Orthostatic VS   01/18/20 0636  97 5 °F (36 4 °C)  80  18  126/59  90 %  None (Room air)  Lying   01/18/20 0300  98 1 °F (36 7 °C)  79  18  120/58  91 %  None (Room air)  Lying   01/17/20 2229  99 5 °F (37 5 °C)  76  18  124/68  93 %  None (Room air)  Lying   01/17/20 1832  97 7 °F (36 5 °C)  71  17  110/64  95 %  None (Room air)  Lying   01/17/20 1321  97 °F (36 1 °C)Abnormal   63  18  116/51  92 %  None (Room air)  Lying   01/17/20 1129    62  18  107/61           Pertinent Labs/Diagnostic Test Results:     EKG 1/17:  Normal sinus rhythm  Left axis deviation  Right bundle branch block  Moderate voltage criteria for LVH, may be normal variant  Abnormal ECG  CXR 1/17:  Trace left pleural effusion  CT head 1/17: No acute intracranial abnormality   Microangiopathic changes    Results from last 7 days   Lab Units 01/18/20  0513 01/17/20  1038   WBC Thousand/uL 9 10 7 50   HEMOGLOBIN g/dL 14 5 14 3   HEMATOCRIT % 43 9 44 4 PLATELETS Thousands/uL 225 208   NEUTROS ABS Thousands/µL 6 40 5 50         Results from last 7 days   Lab Units 01/18/20  0513 01/17/20  1037   SODIUM mmol/L 141 142   POTASSIUM mmol/L 3 6 4 2   CHLORIDE mmol/L 106 107   CO2 mmol/L 30 27   ANION GAP mmol/L 5 8   BUN mg/dL 12 17   CREATININE mg/dL 0 64 0 77   EGFR ml/min/1 73sq m 76 65   CALCIUM mg/dL 9 0 9 1   MAGNESIUM mg/dL 2 1  --      Results from last 7 days   Lab Units 01/18/20  0513 01/17/20  1037   AST U/L 11* 14   ALT U/L 8 8   ALK PHOS U/L 100 94   TOTAL PROTEIN g/dL 5 8* 5 9*   ALBUMIN g/dL 3 7 3 6   TOTAL BILIRUBIN mg/dL 0 50 0 40         Results from last 7 days   Lab Units 01/18/20  0513 01/17/20  1037   GLUCOSE RANDOM mg/dL 92 169*     Results from last 7 days   Lab Units 01/17/20  1804 01/17/20  1448 01/17/20  1037   TROPONIN I ng/mL 0 07* 0 07* 0 07*       Results from last 7 days   Lab Units 01/17/20  1128   CLARITY UA  Clear   COLOR UA  Yellow   SPEC GRAV UA  1 015   PH UA  6 5   GLUCOSE UA mg/dl Negative   KETONES UA mg/dl Negative   BLOOD UA  Negative   PROTEIN UA mg/dl Negative   NITRITE UA  Negative   BILIRUBIN UA  Negative   UROBILINOGEN UA E U /dl 0 2   LEUKOCYTES UA  Negative     ED Treatment:   Medication Administration from 01/17/2020 1016 to 01/17/2020 1252       Date/Time Order Dose Route Action     01/17/2020 1038 sodium chloride 0 9 % bolus 250 mL 250 mL Intravenous New Bag        Past Medical History:   Diagnosis Date    Blind left eye     Fx of fibula     Hypertension      Admitting Diagnosis: Vasovagal syncope [R55]  Syncope [R55]  Convulsive syncope [R55]  Elevated troponin [R79 89]  Age/Sex: 80 y o  female  Admission Orders:  Tele  UP with assist  ORthostatic bp    Scheduled Medications:    Medications:  aspirin 81 mg Oral Daily   docusate sodium 100 mg Oral BID   enoxaparin 40 mg Subcutaneous Daily   [START ON 1/19/2020] furosemide 60 mg Oral Daily   latanoprost 1 drop Right Eye HS   multivitamin-minerals 1 tablet Oral Daily   pravastatin 40 mg Oral Daily With Dinner   SOOTHE NIGHTTIME 1 application Right Eye Daily   timolol 1 drop Right Eye Daily     Continuous IV Infusions:     PRN Meds:    acetaminophen 650 mg Oral Q6H PRN   triamcinolone  Topical BID PRN       IP CONSULT TO CARDIOLOGY  IP CONSULT TO CASE MANAGEMENT    Network Utilization Review Department  [email or web address]  ATTENTION: Please call with any questions or concerns to [phone] and carefully listen to the prompts so that you are directed to the right person  All voicemails are confidential   Tita Ventura all requests for admission clinical reviews, approved or denied determinations and any other requests to dedicated fax number below belonging to the campus where the patient is receiving treatment   List of dedicated fax numbers for the Facilities:  [address] DENIALS (Administrative/Medical Necessity) [phone]   [address] (Maternity/NICU/Pediatrics) [phone]   Colette Underwood [phone]   Pauly Hernandez [phone]   Amalia Doll [phone]   Sidney Gonzalez [phone]   [address] [place] [address] [phone]   [facility]  [phone]   [address] [facility] [address]  [address] [facility] [address] [facility] [phone]

## 2020-01-18 NOTE — CONSULTS
[address]  [place], [address]  [phone]     Tax ID: [id number]      NPI: [id number]                                                Cardiology Consult  Rylan Olmstead 80 y o  female   YOB: 1923 MRN: [id number]  Unit/Bed#: [facility] [address] 121-02 Encounter: [id number]      Physician Requesting Consult: Logan Madsen MD  Reason for Consult / Principal Problem: Syncope    Assessment and Plan  1  Syncope/Fall  · Echo - 12/30/19 - LVEF 60%, mild LVH, grade 1 diastolic dysfunction, mild-to-moderate aortic stenosis with mean gradient 18, moderate MAC, mild pulmonary hypertension  2  Non-MI troponin elevation  3  Aortic stenosis, mild-moderate  4  Hypertension  5  Cognitive impairement  6  Advanced age      Plan  Syncope/Fall  · Secondary to orthostatic hypotension  · States has had similar symptoms for while when she gets up quickly  · Has very limited ambulation at baseline, and walks with a walker  · Counseled regarding need to get up slowly, leg exercises at the side of the bed before getting up  · Liberalize salt intake for now  · Compression stockings  · If still remains dizzy on standing up, consider additional IV fluids    Non-MI troponin elevation  · ECG - normal sinus rhythm, right bundle-branch block, left axis deviation/LAFB, voltage criteria for LVH - unchanged from previous ECGs from 2019  · No chest pain  · Troponin flat at 0 07  · Recent echo without any significant wall motion abnormalities  · No further cardiac evaluation for now    Aortic stenosis, mild-moderate  · Personally reviewed recent echocardiogram, normal LV function, mild-to-moderate aortic stenosis  · Asymptomatic  · Continue to monitor    History of Present Illness   HPI: Rylan Olmstead is a 80y o  year old female who presents with a fall    She was in her usual state of health, sitting on the toilet seat, due to diarrhea   She got up, felt dizzy and fell forward onto her hands  It is unclear if she truly passed out  She does not report any other symptoms of chest pain, shortness of breath or palpitations around this time  She was subsequently brought in to the ED for evaluation She notes having a longstanding history of dizziness when she gets up too fast      She has limited ambulation at baseline, walks very short distances with a walker, else usually travels in a wheelchair  No exertional symptoms with the same      Past Medical History:   Diagnosis Date    Blind left eye     Fx of fibula     Hypertension      Past Surgical History:   Procedure Laterality Date    EYE SURGERY      HYSTERECTOMY      THYROID SURGERY       Family History   Family history unknown: Yes     Meds/Allergies   all current active meds have been reviewed and current meds:   Current Facility-Administered Medications   Medication Dose Route Frequency    acetaminophen (TYLENOL) tablet 650 mg  650 mg Oral Q6H PRN    aspirin chewable tablet 81 mg  81 mg Oral Daily    docusate sodium (COLACE) capsule 100 mg  100 mg Oral BID    enoxaparin (LOVENOX) subcutaneous injection 40 mg  40 mg Subcutaneous Daily    [START ON 1/19/2020] furosemide (LASIX) tablet 60 mg  60 mg Oral Daily    latanoprost (XALATAN) 0 005 % ophthalmic solution 1 drop  1 drop Right Eye HS    multivitamin-minerals (CENTRUM) tablet 1 tablet  1 tablet Oral Daily    pravastatin (PRAVACHOL) tablet 40 mg  40 mg Oral Daily With Dinner    SOOTHE NIGHTTIME OINT 1 application  1 application Right Eye Daily    timolol (TIMOPTIC) 0 5 % ophthalmic solution 1 drop  1 drop Right Eye Daily    triamcinolone (KENALOG) 0 1 % cream   Topical BID PRN     Medications Prior to Admission   Medication    aspirin 81 MG tablet    Coenzyme Q10 (CO Q 10) 100 MG CAPS    Difluprednate (DUREZOL) 0 05 % EMUL    furosemide (LASIX) 40 mg tablet    latanoprost (XALATAN) 0 005 % ophthalmic solution    Multiple Vitamin (MULTIVITAMINS PO)    simvastatin (ZOCOR) 20 mg tablet    sodium chloride (IRISH 128) 5 % hypertonic ophthalmic ointment    timolol (BETIMOL) 0 5 % ophthalmic solution    Acetaminophen (APAP) 325 MG tablet    dextromethorphan-guaiFENesin (ROBITUSSIN DM)  mg/5 mL syrup    loperamide (IMODIUM) 2 mg capsule    nystatin (MYCOSTATIN) powder    pravastatin (PRAVACHOL) 40 mg tablet    triamcinolone (KENALOG) 0 1 % cream     No Known Allergies  Social History     Socioeconomic History    Marital status:       Spouse name: None    Number of children: None    Years of education: None    Highest education level: None   Occupational History    None   Social Needs    Financial resource strain: None    Food insecurity:     Worry: None     Inability: None    Transportation needs:     Medical: None     Non-medical: None   Tobacco Use    Smoking status: Never Smoker    Smokeless tobacco: Never Used   Substance and Sexual Activity    Alcohol use: Never     Frequency: Never     Binge frequency: Never    Drug use: No    Sexual activity: None   Lifestyle    Physical activity:     Days per week: None     Minutes per session: None    Stress: None   Relationships    Social connections:     Talks on phone: None     Gets together: None     Attends Jain service: None     Active member of club or organization: None     Attends meetings of clubs or organizations: None     Relationship status: None    Intimate partner violence:     Fear of current or ex partner: None     Emotionally abused: None     Physically abused: None     Forced sexual activity: None   Other Topics Concern    None   Social History Narrative    None         Review of Systems  All other systems negative, except as noted in HPI    Vitals:    01/18/20 0300 01/18/20 0636 01/18/20 1002 01/18/20 1003   BP: 120/58 126/59 138/70 110/80   BP Location: Left arm Right arm Right arm Right arm   Pulse: 79 80     Resp: 18 18     Temp: 98 1 °F (36 7 °C) 97 5 °F (36 4 °C)     TempSrc: Temporal Temporal     SpO2: 91% 90%     Weight:       Height:         Orthostatic Blood Pressures      Most Recent Value   Blood Pressure  110/80 filed at 01/18/2020 1003   Patient Position - Orthostatic VS  Sitting - Orthostatic VS filed at 01/18/2020 1003        Body mass index is 31 56 kg/m²  Wt Readings from Last 5 Encounters:   01/17/20 83 4 kg (183 lb 13 8 oz)   12/28/19 85 kg (187 lb 5 oz)   12/07/19 73 kg (161 lb)   05/26/19 73 2 kg (161 lb 6 oz)   01/21/19 77 2 kg (170 lb 1 6 oz)     I/O last 3 completed shifts: In: 250 [IV Piggyback:250]  Out: -       Physical Exam   Constitutional: She is oriented to person, place, and time  She appears well-developed and well-nourished  No distress  HENT:   Head: Normocephalic and atraumatic  Eyes: No scleral icterus  Neck: No JVD present  Cardiovascular: Normal rate and regular rhythm  Exam reveals no gallop and no friction rub  Murmur heard  Pulmonary/Chest: Effort normal and breath sounds normal  No respiratory distress  She has no rales  Abdominal: Soft  She exhibits no distension  There is no tenderness  Neurological: She is alert and oriented to person, place, and time  Psychiatric: She has a normal mood and affect  Her behavior is normal    Nursing note and vitals reviewed              Labs:  Results from last 7 days   Lab Units 01/18/20  0513 01/17/20  1038   WBC Thousand/uL 9 10 7 50   HEMOGLOBIN g/dL 14 5 14 3   HEMATOCRIT % 43 9 44 4   RDW % 13 4 13 7   PLATELETS Thousands/uL 225 208     Results from last 7 days   Lab Units 01/18/20  0513 01/17/20  1037   POTASSIUM mmol/L 3 6 4 2   CHLORIDE mmol/L 106 107   CO2 mmol/L 30 27   MAGNESIUM mg/dL 2 1  --    BUN mg/dL 12 17   CREATININE mg/dL 0 64 0 77   CALCIUM mg/dL 9 0 9 1   AST U/L 11* 14   ALT U/L 8 8   ALK PHOS U/L 100 94     Results from last 7 days   Lab Units 01/17/20  1804 01/17/20  1448 01/17/20  1037   TROPONIN I ng/mL 0 07* 0 07* 0 07*                     Telemetry: NSR, no significant tachy or jacquie-arrhythmia  Imaging: Ct Abdomen Pelvis Wo Contrast    Result Date: 12/28/2019  Narrative: CT ABDOMEN AND PELVIS WITHOUT IV CONTRAST INDICATION:   Nausea/vomiting  COMPARISON:  None  TECHNIQUE:  CT examination of the abdomen and pelvis was performed without intravenous contrast   Axial, sagittal, and coronal 2D reformatted images were created from the source data and submitted for interpretation  Radiation dose length product (DLP) for this visit:  840 1 mGy-cm   This examination, like all CT scans performed in the [facility], was performed utilizing techniques to minimize radiation dose exposure, including the use of iterative reconstruction and automated exposure control  Enteric contrast material not given  FINDINGS: ABDOMEN LOWER CHEST:  No clinically significant abnormality identified in the visualized lower chest  LIVER/BILIARY TREE:  Mild fatty infiltrative changes in the liver  GALLBLADDER:  Incompletely distended  Multiple calculi  No pericholecystic inflammatory change  SPLEEN:  Unremarkable  PANCREAS:  Unremarkable  ADRENAL GLANDS:  Unremarkable  KIDNEYS/URETERS:  Unremarkable  No hydronephrosis  STOMACH AND BOWEL:  Evaluation of the GI tract limited due to lack of oral contrast material  Stomach incompletely distended and filled with ingested food products  Moderate-sized hiatal hernia  Fecalization of small bowel contents, compatible with delayed small bowel transit  The cecum, ascending colon and transverse colon are decompressed, up to the level of the splenic flexure  Distally, the colon is normally distended with feces  The rectum is moderately distended and feces filled  There are extensive diverticular changes of the distal descending colon and sigmoid colon  Nothing to suggest acute diverticulitis  APPENDIX:  No findings to suggest appendicitis  ABDOMINOPELVIC CAVITY:  No ascites or free intraperitoneal air  No lymphadenopathy   VESSELS:  Atherosclerotic changes are present  No evidence of aneurysm  PELVIS REPRODUCTIVE ORGANS:  Patient is status post hysterectomy  URINARY BLADDER:  Incompletely distended  Mild circumferential wall thickening  ABDOMINAL WALL/INGUINAL REGIONS:  Unremarkable  OSSEOUS STRUCTURES: Osseous structures demineralized  No acute fracture or destructive osseous lesion  Degenerative changes lumbar spine  Impression: 1  Colonic diverticulosis  2   Mild constipation and fecal impaction  3   Cholelithiasis without CT evidence of acute cholecystitis  4   Hiatal hernia  5   Mild abnormal appearance of the urinary bladder  Although the findings may be due to under distention, correlate for cystitis/urinary tract infection  The study was marked in [facility] for immediate notification  Workstation performed: [id number]     X-ray Chest 1 View Portable    Result Date: 12/29/2019  Narrative: CHEST INDICATION:   Stroke  COMPARISON:  5/25/2019 EXAM PERFORMED/VIEWS:  XR CHEST PORTABLE FINDINGS: Cardiomediastinal silhouette appears unremarkable  The lungs are clear  Elevated right hemidiaphragm as before  No pneumothorax or pleural effusion  Osseous structures appear within normal limits for patient age  Impression: No acute cardiopulmonary disease  Workstation performed: [id number]     Ct Head Without Contrast    Result Date: 1/17/2020  Narrative: CT BRAIN - WITHOUT CONTRAST INDICATION:   Syncope, simple, normal neuro exam  COMPARISON:  MRI brain dated 12/30/2019 and CT dated 12/28/2019 TECHNIQUE:  CT examination of the brain was performed  In addition to axial images, coronal 2D reformatted images were created and submitted for interpretation  Radiation dose length product (DLP) for this visit:  872 8 mGy-cm   This examination, like all CT scans performed in the [facility], was performed utilizing techniques to minimize radiation dose exposure, including the use of iterative reconstruction and automated exposure control    IMAGE QUALITY:  Diagnostic  FINDINGS: PARENCHYMA: Decreased attenuation is noted in periventricular and subcortical white matter demonstrating an appearance that is statistically most likely to represent advanced microangiopathic change; this appearance is similar when compared to most recent prior examination  No CT signs of acute infarction  No intracranial mass, mass effect or midline shift  No acute parenchymal hemorrhage  VENTRICLES AND EXTRA-AXIAL SPACES:  Ventricles and extra-axial CSF spaces are prominent commensurate with the degree of volume loss  No hydrocephalus  No acute extra-axial hemorrhage  VISUALIZED ORBITS AND PARANASAL SINUSES:  Unremarkable  CALVARIUM AND EXTRACRANIAL SOFT TISSUES:  Normal      Impression: No acute intracranial abnormality  Microangiopathic changes  Workstation performed: [id number]     Mri Brain Wo Contrast    Result Date: 12/30/2019  Narrative: MRI BRAIN WITHOUT CONTRAST INDICATION: TIA, initial exam  COMPARISON:   CT from 12/28/2019 TECHNIQUE:  Sagittal T1, axial T2, axial FLAIR, axial T1, axial Trufant and axial diffusion imaging  IMAGE QUALITY:  Diagnostic  FINDINGS: BRAIN PARENCHYMA:  There is no discrete mass, mass effect or midline shift  There is no intracranial hemorrhage  There is no evidence of acute infarction and diffusion imaging is unremarkable  There is severe periventricular, subcortical and deep white matter chronic microvascular ischemic change  There is age-related involutional change  There is chronic microvascular ischemic change involving the evert    VENTRICLES:  Ventricles and extra-axial CSF spaces are prominent commensurate with the degree of volume loss  No hydrocephalus  No acute extra-axial hemorrhage  SELLA AND PITUITARY GLAND:  Normal  ORBITS:  The patient is status post bilateral cataract surgery  PARANASAL SINUSES:  Normal  VASCULATURE:  Evaluation of the major intracranial vasculature demonstrates appropriate flow voids   CALVARIUM AND SKULL BASE:  Normal  EXTRACRANIAL SOFT TISSUES:  Normal      Impression: No mass effect, acute intracranial hemorrhage or evidence of recent infarction  Age-related involutional and severe chronic microvascular ischemic change  Workstation performed: [id number]     Xr Chest 1 View    Result Date: 1/17/2020  Narrative: CHEST INDICATION:   Syncope  COMPARISON:  December 28, 2019 EXAM PERFORMED/VIEWS:  XR CHEST 1 VIEW FINDINGS: Cardiomediastinal silhouette appears unremarkable  Trace left pleural effusion  No pneumothorax  Osseous structures appear within normal limits for patient age  Impression: Trace left pleural effusion  Workstation performed: [id number]     Ct Stroke Alert Brain    Result Date: 12/28/2019  Narrative: CT BRAIN - STROKE ALERT PROTOCOL INDICATION:   Altered mental status  Left-sided facial droop as per EMS  No focal neurologic deficits as per the emergency room physician  Vomiting  COMPARISON:  Numerous prior studies  Most recent study January 6, 2018  TECHNIQUE:  CT examination of the brain was performed  In addition to axial images, coronal reformatted images were created and submitted for interpretation  Radiation dose length product (DLP) for this visit:  0699 646 28 85 mGy-cm   This examination, like all CT scans performed in the [facility], was performed utilizing techniques to minimize radiation dose exposure, including the use of iterative reconstruction and automated exposure control  IMAGE QUALITY:  Diagnostic  FINDINGS: PARENCHYMA:  Decreased attenuation is noted in the supratentorial white matter demonstrating an appearance most consistent with severe microangiopathic change  No intracranial mass, mass effect or midline shift  No acute intracranial hemorrhage  No CT signs of acute infarction  Mild calcification of the cavernous internal carotid ateries   VENTRICLES AND EXTRA-AXIAL SPACES:  Enlargement of ventricles and extra-axial CSF spaces consistent with cerebral and cerebellar atrophy  VISUALIZED ORBITS AND PARANASAL SINUSES:  Globes are symmetric and intact  Bilateral lens surgery  Minimal mucosal thickening left sphenoid sinus  Remaining paranasal sinuses and mastoid air cells clear  CALVARIUM AND EXTRACRANIAL SOFT TISSUES:   Normal      Impression: Cerebral atrophy with extensive chronic small vessel ischemic white matter disease  If there is continued concern for acute infarction, a follow-up MRI of the brain with diffusion-weighted imaging should be obtained  Findings were directly discussed with Zoie Jung on 12/28/2019 10:09 AM  Workstation performed: [id number]     Cta Stroke Alert (head/neck)    Result Date: 12/28/2019  Narrative: CTA NECK AND BRAIN WITH CONTRAST INDICATION: Syncope, simple, normal neuro exam COMPARISON:   Numerous prior studies, most recent of which is a noncontrast CT brain December 28, 2019 and January 6, 2018  TECHNIQUE:   Post contrast imaging was performed after administration of iodinated contrast through the neck and brain  Post contrast axial 0 625 mm images timed to opacify the arterial system  3D rendering was performed on an independent workstation  MIP reconstructions performed  Coronal reconstructions were performed of the noncontrast portion of the brain  Radiation dose length product (DLP) for this visit:  226 1 mGy-cm   This examination, like all CT scans performed in the [facility], was performed utilizing techniques to minimize radiation dose exposure, including the use of iterative reconstruction and automated exposure control  IV Contrast:  85 mL of iohexol (OMNIPAQUE)  IMAGE QUALITY:   Diagnostic FINDINGS: CERVICAL VASCULATURE AORTIC ARCH AND GREAT VESSELS:  Mild athersclerotic disease of the arch, proximal great vessels and visualized subclavian vessels  No significant stenosis  RIGHT VERTEBRAL ARTERY CERVICAL SEGMENT:  Normal origin  The vessel is normal in caliber throughout the neck   LEFT VERTEBRAL ARTERY CERVICAL SEGMENT:  Normal origin  The vessel is normal in caliber throughout the neck  RIGHT EXTRACRANIAL CAROTID SEGMENT:  Normal caliber common carotid artery  Normal bifurcation and cervical internal carotid artery  No stenosis or dissection  LEFT EXTRACRANIAL CAROTID SEGMENT:  Mild atherosclerotic disease of the distal common carotid artery and proximal cervical internal carotid artery without significant stenosis compared to the more distal ICA  NASCET criteria was used to determine the degree of internal carotid artery diameter stenosis  INTRACRANIAL VASCULATURE INTERNAL CAROTID ARTERIES:  Normal enhancement of the intracranial portions of the internal carotid arteries  Moderate hard and soft atherosclerotic plaque formation are present in the cavernous and supraclinoid segments bilaterally  There is ectasia of the supraclinoid left internal carotid artery  Normal ophthalmic artery origins  Normal ICA terminus  ANTERIOR CIRCULATION:  Symmetric A1 segments and anterior cerebral arteries with normal enhancement  Normal anterior communicating artery  MIDDLE CEREBRAL ARTERY CIRCULATION:  There is severe stenosis of the M1 segment of the right middle cerebral artery (series 4, image 814)  Distally, there is flow present in the terminal branches of the right middle cerebral artery, however flow is slightly diminished  The left middle cerebral artery circulation is normal  DISTAL VERTEBRAL ARTERIES:  Normal distal vertebral arteries  Posterior inferior cerebellar artery origins are normal  Normal vertebral basilar junction  BASILAR ARTERY:  Basilar artery is normal in caliber  Normal superior cerebellar arteries  POSTERIOR CEREBRAL ARTERIES: Both posterior cerebral arteries arises from the basilar tip  Both arteries demonstrate normal enhancement  Posterior communicating arteries are absent bilaterally   DURAL VENOUS SINUSES:  Normal  NON VASCULAR ANATOMY BONY STRUCTURES:  Reversal of the cervical lordotic curvature  Degenerative changes diffusely throughout the cervical spine  SOFT TISSUES OF THE NECK:  Thyroid gland enlarged and heterogeneous in CT density  Dense calcifications right lobe  No discrete nodules  THORACIC INLET:  Unremarkable  Impression: Severe narrowing of the M1 segment of the right middle cerebral artery with diminished flow throughout the distal right middle cerebral artery territory branches  Findings were directly discussed with Pedro Luis Mast on 2019 10:21 AM  Workstation performed: [id number]       Cardiac testing:   Results for orders placed during the hospital encounter of 19   Echo complete with contrast if indicated    Narrative [address] [employer] [address]    Transthoracic Echocardiogram  2D, M-mode, Doppler, and Color Doppler    Study date:  30-Dec-2019    Patient: Isaiah Beltrán  MR number: [id number]  Account number: [de-identified]  : [id number]  Age: 80 years  Gender: Female  Status: Inpatient  [facility]   Height: 64 in  Weight: 186 6 lb  BP: 101/ 49 mmHg    Indications: CVA  Diagnoses: I63 9 - Cerebral infarction, unspecified    Sonographer:  Alcira Beyer RDCS  Referring Physician:  VALDZE Dietz  Group:  [facility]  Interpreting Physician:  Jim Marie MD    SUMMARY    LEFT VENTRICLE:  Systolic function was normal  Ejection fraction was estimated to be 60 %  There were no regional wall motion abnormalities  There was mild concentric hypertrophy  Doppler parameters were consistent with abnormal left ventricular relaxation (grade 1 diastolic dysfunction)  MITRAL VALVE:  There was moderate annular calcification  There was trace regurgitation  AORTIC VALVE:  The valve was trileaflet  Leaflets exhibited moderately increased thickness, mild calcification, and moderately reduced cuspal separation    Transaortic velocity was increased due to valvular stenosis  There was mild to moderate stenosis  Valve mean gradient was 18 mmHg  Estimated aortic valve area (by VTI) was 1 13 cmï¾²  TRICUSPID VALVE:  There was mild to moderate regurgitation  The findings suggest mild pulmonary hypertension  HISTORY: Consistent with transient ischemic attack or stroke  Lower extremity edema  PRIOR HISTORY: Risk factors: hypertension and hypercholesterolemia  PROCEDURE: The study was performed in the [facility]  This was a routine study  The transthoracic approach was used  The study included complete 2D imaging, M-mode, complete spectral Doppler, and color Doppler  The  heart rate was 68 bpm, at the start of the study  Images were obtained from the parasternal, apical, subcostal, and suprasternal notch acoustic windows  This was a technically difficult study  LEFT VENTRICLE: Size was normal  Systolic function was normal  Ejection fraction was estimated to be 60 %  There were no regional wall motion abnormalities  Wall thickness was mildly increased  There was mild concentric hypertrophy  DOPPLER: Doppler parameters were consistent with abnormal left ventricular relaxation (grade 1 diastolic dysfunction)  RIGHT VENTRICLE: The size was normal  Systolic function was normal  Wall thickness was normal     LEFT ATRIUM: Size was normal     RIGHT ATRIUM: Size was normal     MITRAL VALVE: There was moderate annular calcification  Valve structure was normal  There was normal leaflet separation  DOPPLER: The transmitral velocity was within the normal range  There was no evidence for stenosis  There was trace  regurgitation  AORTIC VALVE: The valve was trileaflet  Leaflets exhibited moderately increased thickness, mild calcification, and moderately reduced cuspal separation  DOPPLER: Transaortic velocity was increased due to valvular stenosis  There was mild  to moderate stenosis   There was no significant regurgitation  TRICUSPID VALVE: The valve structure was normal  There was normal leaflet separation  DOPPLER: The transtricuspid velocity was within the normal range  There was no evidence for stenosis  There was mild to moderate regurgitation  Estimated  peak PA pressure was 45 mmHg  The findings suggest mild pulmonary hypertension  PULMONIC VALVE: Leaflets exhibited normal thickness, no calcification, and normal cuspal separation  DOPPLER: The transpulmonic velocity was within the normal range  There was trace regurgitation  PERICARDIUM: There was no pericardial effusion  The pericardium was normal in appearance  AORTA: The root exhibited normal size  SYSTEMIC VEINS: IVC: The inferior vena cava was normal in size      MEASUREMENT TABLES    2D MEASUREMENTS  LVOT   (Reference normals)  Diam   20 mm   (--)    DOPPLER MEASUREMENTS  LVOT   (Reference normals)  Peak victor manuel   101 cm/s   (--)  Mean victor manuel   71 cm/s   (--)  VTI   24 cm   (--)  Peak gradient   4 mmHg   (--)  Mean gradient   2 mmHg   (--)  Stroke vol   75 4 ml   (--)  Aortic valve   (Reference normals)  Peak victor manuel   293 cm/s   (--)  Mean victor manuel   194 cm/s   (--)  VTI   66 cm   (--)  Peak gradient   34 mmHg   (--)  Mean gradient   18 mmHg   (--)  Obstr index, VTI   0 36    (--)  Valve area, VTI   1 13 cmï¾²   (--)  Obstr index, Vmax   0 34    (--)  Valve area, Vmax   1 07 cmï¾²   (--)  Obstr index, Vmean   0 37    (--)  Valve area, Vmean   1 16 cmï¾²   (--)    SYSTEM MEASUREMENT TABLES    2D  %FS: 27 56 %  AV Diam: 2 5 cm  Ao asc: 2 95 cm  EDV(Teich): 86 94 ml  EF(Teich): 53 75 %  ESV(Teich): 40 21 ml  IVSd: 1 34 cm  LA Area: 15 62 cm2  LA Diam: 3 58 cm  LVEDV MOD A4C: 78 87 ml  LVEF MOD A4C: 62 51 %  LVESV MOD A4C: 29 57 ml  LVIDd: 4 38 cm  LVIDs: 3 18 cm  LVLd A4C: 6 85 cm  LVLs A4C: 5 93 cm  LVOT Diam: 1 97 cm  LVPWd: 1 21 cm  RA Area: 13 11 cm2  RV Diam : 3 15 cm  SI(Teich): 24 59 ml/m2  SV MOD A4C: 49 3 ml  SV(Cube): 52 23 ml  SV(Teich): 46 73 ml    CW  AV Env  Ti: 325 32 ms  AV VTI: 70 64 cm  AV Vmax: 2 95 m/s  AV Vmean: 2 18 m/s  AV maxP 91 mmHg  AV meanP 49 mmHg  TR Vmax: 3 13 m/s  TR maxP 16 mmHg    MM  TAPSE: 1 69 cm    PW  MJ (VTI): 1 07 cm2  MJ Vmax: 1 05 cm2  E': 0 04 m/s  E/E': 18 5  LVOT Env  Ti: 384 47 ms  LVOT VTI: 24 85 cm  LVOT Vmax: 1 02 m/s  LVOT Vmean: 0 65 m/s  LVOT maxP 17 mmHg  LVOT meanP 98 mmHg  MV A [place]asanna: 1 14 m/s  MV Dec [place]: 2 08 m/s2  MV DecT: 384 56 ms  MV E [place]: 0 8 m/s  MV E/A Ratio: 0 7    [employer] Accredited Echocardiography Laboratory    Prepared and electronically signed by    Hua Shaffer MD  Signed 30-Dec-2019 19:25:32       No results found for this or any previous visit  No results found for this or any previous visit  No results found for this or any previous visit

## 2020-01-18 NOTE — PLAN OF CARE
Problem: PHYSICAL THERAPY ADULT  Goal: Performs mobility at highest level of function for planned discharge setting  See evaluation for individualized goals  Description  Treatment/Interventions: Functional transfer training, LE strengthening/ROM, Therapeutic exercise, Endurance training, Patient/family training, Equipment eval/education, Bed mobility, Gait training, Spoke to nursing, OT, Spoke to advanced practitioner  Equipment Recommended: Walker(pt  has own)       See flowsheet documentation for full assessment, interventions and recommendations  Note:   Prognosis: Fair  Problem List: Decreased strength, Decreased endurance, Impaired balance, Decreased mobility, Decreased coordination, Decreased cognition, Impaired judgement, Decreased safety awareness, Obesity  Assessment: Pt is 80 y o  female seen for PT evaluation s/p [facility]/17/2020 w/ Syncope  PT consulted to assess pt's functional mobility and d/c needs  Order placed for PT eval and tx, w/ activity as tolerated order  Comorbidities affecting pt's physical performance at time of assessment include: weakness, syncope, HTN, cognitive impairment,PVD  PTA, pt was resident of [facility]  Personal factors affecting pt at time of IE include: communication issues, inability to ambulate household distances, inability to navigate community distances, inability to navigate level surfaces w/o external assistance, unable to perform dynamic tasks in community, decreased cognition, hearing impairments, visual impairments, decreased initiation and engagement and limited insight into impairments   Please find objective findings from PT assessment regarding body systems outlined above with impairments and limitations including weakness, impaired balance, decreased endurance, impaired coordination, gait deviations, decreased activity tolerance, decreased functional mobility tolerance, decreased safety awareness, impaired judgement, fall risk and decreased cognition  The following objective measures performed on IE also reveal limitations: Barthel Index: 20/100  Pt's clinical presentation is currently unstable/unpredictable  Pt to benefit from continued PT tx to address deficits as defined above and maximize level of functional independent mobility and consistency  From PT/mobility standpoint, recommendation at time of d/c would be STR pending progress in order to facilitate return to PLOF  Recommendation: Short-term skilled PT     PT - OK to Discharge: Yes(if medically stable to STR)    See flowsheet documentation for full assessment

## 2020-01-18 NOTE — OCCUPATIONAL THERAPY NOTE
Occupational Therapy Evaluation      [facility]    1/18/2020    Principal Problem:    Syncope  Active Problems:    Essential hypertension    Cognitive impairment    Elevated troponin      Past Medical History:   Diagnosis Date    Blind left eye     Fx of fibula     Hypertension        Past Surgical History:   Procedure Laterality Date    EYE SURGERY      HYSTERECTOMY      THYROID SURGERY          01/18/20 0817   Note Type   Note type Eval/Treat   Restrictions/Precautions   Weight Bearing Precautions Per Order No   Other Precautions Telemetry; Fall Risk;Bed Alarm;Hard of hearing;Visual impairment   Pain Assessment   Pain Assessment No/denies pain   Pain Score No Pain   Home Living   Type of Home Assisted living   Home Layout One level;Performs ADLs on one level; Able to live on main level with bedroom/bathroom; Access; Ramped entrance;Elevator   Bathroom Shower/Tub None   Bathroom Toilet Raised   Bathroom Equipment Shower chair   [facility]; Wheelchair-manual   Additional Comments RW for transfers mostly per pt, spends time in w/c at facility   Prior Function   Level of [place] Needs assistance with IADLs; Needs assistance with ADLs and functional mobility   Lives With Facility staff   Receives Help From Personal care attendant   ADL Assistance Needs assistance   IADLs Needs assistance   Falls in the last 6 months 0   Vocational Retired   ADL   Eating Assistance 5  Supervision/Setup   Eating Deficit Setup   Grooming Assistance 4  Minimal Assistance   [address] 3  Moderate [place] [address] Unable to assess   [address] 3  Moderate Assistance    [facility] Unable to assess   [address]  Unable to assess   Bed Mobility   Rolling R 3  Moderate assistance   Additional items Assist x 1;Bedrails; Increased time required;Verbal cues;LE management   Supine to Sit 3  Moderate assistance   Additional items Assist x 2;Bedrails; Increased time required;Verbal cues;LE management  (c/o lightheadedness)   Sit to Supine 3  Moderate assistance   Additional items Assist x 2;Bedrails; Increased time required;Verbal cues;LE management   Transfers   Sit to Stand 3  Moderate assistance   Additional items Assist x 2;Bedrails; Increased time required;Verbal cues  (x # trials)   Stand to Sit 3  Moderate assistance   Additional items Assist x 2;Bedrails; Impulsive;Verbal cues   Toilet transfer Unable to assess   Functional Mobility   Functional Mobility   (unable to assess)   Balance   Static Sitting Fair   Dynamic Sitting Fair -   Static Standing Poor -   Dynamic Standing   (unable)   Activity Tolerance   Activity Tolerance Patient limited by fatigue   Medical Staff Made Aware yes   Nurse Made Aware yes   RUE Assessment   RUE Assessment   (strength F+)   LUE Assessment   LUE Assessment   (Strength F+)   Hand Function   Gross Motor Coordination Functional   Fine Motor Coordination Functional   Cognition   Overall Cognitive Status Impaired   Arousal/Participation Alert; Cooperative   Attention Attends with cues to redirect   Orientation Level Oriented to person;Oriented to place; Disoriented to time;Disoriented to situation   Memory Decreased recall of precautions;Decreased recall of recent events   Following Commands Follows one step commands with increased time or repetition   Assessment   Limitation Decreased ADL status; Decreased UE strength;Decreased cognition;Decreased endurance;Decreased self-care trans   Prognosis Good   Assessment Pt is a 80 y o  female seen for OT evaluation s/p admit to [address] on 1/17/2020 w/ Syncope  Comorbidities affecting pt's functional performance at time of assessment include: HTN and Cognative impairment, PVD, see H & P for additional PMHx  Personal factors affecting pt at time of IE include:difficulty performing ADLS   Prior to admission, pt was participating in self care ADL's and assisting with functional transfers at facility, D for IADL's  Upon evaluation: Pt requires an increased level of assistance with self care ADL's and functional transfers/toileting  r/t the following deficits impacting occupational performance: weakness, decreased strength, decreased balance, decreased tolerance and decreased safety awareness  Pt to benefit from continued skilled OT tx while in the hospital to address deficits as defined above and maximize level of functional independence w ADL's and functional mobility  Occupational Performance areas to address include: bathing/shower, dressing and functional mobility  From OT standpoint, recommendation at time of d/c would be return to [facility] with in house therapies  Goals   Patient Goals to rest now   STG Time Frame 3-5   Short Term Goal #1   (increase light UB self care to set up/SBA)   Short Term Goal #2 increase bed mobility to Min A X 2 for self care participation   LTG Time Frame 7-10   Long Term Goal #1 increase bed mobility to MI for self care participation  (increase B/L UE syrength X 1/2 grade for increased abilityns)   Long Term Goal #2 increase LB self care to SBA to knees, for successful return to [facility]   Long Term Goal increase functional transfers to Min A X 1-2, for self toileting and  decreased burden of caregivers   Plan   Treatment Interventions UE strengthening/ROM;ADL retraining;Functional transfer training; Endurance training;Cognitive reorientation;Patient/family training; Activityengagement   Goal Expiration Date 01/28/20   OT Treatment Day 0   OT Frequency 3-5x/wk   Recommendation   OT Discharge Recommendation   (return to [facility] with in house therapies)   OT - OK to Discharge   (to [facility] when medically stable)   Barthel Index   Feeding 5   Bathing 0   Grooming Score 0   Dressing Score 5   Bladder Score 0   Bowels Score 5   Toilet Use Score 0   Transfers (Bed/Chair) Score 5   Mobility (Level Surface) Score 0   Stairs Score 0   Barthel Index Score 20   Mireya Diggs OT

## 2020-01-18 NOTE — PLAN OF CARE
Problem: OCCUPATIONAL THERAPY ADULT  Goal: Performs self-care activities at highest level of function for planned discharge setting  See evaluation for individualized goals  Description  Treatment Interventions: UE strengthening/ROM, ADL retraining, Functional transfer training, Endurance training, Cognitive reorientation, Patient/family training, Activityengagement          See flowsheet documentation for full assessment, interventions and recommendations  Note:   Limitation: Decreased ADL status, Decreased UE strength, Decreased cognition, Decreased endurance, Decreased self-care trans  Prognosis: Good  Assessment: Pt is a 80 y o  female seen for OT evaluation s/p admit to [facility] on 1/17/2020 w/ Syncope  Comorbidities affecting pt's functional performance at time of assessment include: HTN and Cognative impairment, PVD, see H & P for additional PMHx  Personal factors affecting pt at time of IE include:difficulty performing ADLS  Prior to admission, pt was participating in self care ADL's and assisting with functional transfers at facility, D for IADL's  Upon evaluation: Pt requires an increased level of assistance with self care ADL's and functional transfers/toileting  r/t the following deficits impacting occupational performance: weakness, decreased strength, decreased balance, decreased tolerance and decreased safety awareness  Pt to benefit from continued skilled OT tx while in the hospital to address deficits as defined above and maximize level of functional independence w ADL's and functional mobility  Occupational Performance areas to address include: bathing/shower, dressing and functional mobility  From OT standpoint, recommendation at time of d/c would be return to [facility] with in house therapies         OT Discharge Recommendation: [facility])  OT - OK to Discharge: ([facility])

## 2020-01-18 NOTE — PLAN OF CARE
Problem: Potential for Falls  Goal: Patient will remain free of falls  Description  INTERVENTIONS:  - Assess patient frequently for physical needs  -  Identify cognitive and physical deficits and behaviors that affect risk of falls    -  [place] fall precautions as indicated by assessment   - Educate patient/family on patient safety including physical limitations  - Instruct patient to call for assistance with activity based on assessment  - Modify environment to reduce risk of injury  - Consider OT/PT consult to assist with strengthening/mobility  Outcome: Progressing     Problem: Prexisting or High Potential for Compromised Skin Integrity  Goal: Skin integrity is maintained or improved  Description  INTERVENTIONS:  - Identify patients at risk for skin breakdown  - Assess and monitor skin integrity  - Assess and monitor nutrition and hydration status  - Monitor labs   - Assess for incontinence   - Turn and reposition patient  - Assist with mobility/ambulation  - Relieve pressure over bony prominences  - Avoid friction and shearing  - Provide appropriate hygiene as needed including keeping skin clean and dry  - Evaluate need for skin moisturizer/barrier cream  - Collaborate with interdisciplinary team   - Patient/family teaching  - Consider wound care consult   Outcome: Progressing     Problem: PAIN - ADULT  Goal: Verbalizes/displays adequate comfort level or baseline comfort level  Description  Interventions:  - Encourage patient to monitor pain and request assistance  - Assess pain using appropriate pain scale  - Administer analgesics based on type and severity of pain and evaluate response  - Implement non-pharmacological measures as appropriate and evaluate response  - Consider cultural and social influences on pain and pain management  - Notify physician/advanced practitioner if interventions unsuccessful or patient reports new pain  Outcome: Progressing     Problem: INFECTION - ADULT  Goal: Absence or prevention of progression during hospitalization  Description  INTERVENTIONS:  - Assess and monitor for signs and symptoms of infection  - Monitor lab/diagnostic results  - Monitor all insertion sites, i e  indwelling lines, tubes, and drains  - [place] appropriate cooling/warming therapies per order  - Administer medications as ordered  - Instruct and encourage patient and family to use good hand hygiene technique     Outcome: Progressing     Problem: SAFETY ADULT  Goal: Maintain or return to baseline ADL function  Description  INTERVENTIONS:  -  Assess patient's ability to carry out ADLs; assess patient's baseline for ADL function and identify physical deficits which impact ability to perform ADLs (bathing, care of mouth/teeth, toileting, grooming, dressing, etc )  - Assess/evaluate cause of self-care deficits   - Assess range of motion  - Assess patient's mobility; develop plan if impaired  - Assess patient's need for assistive devices and provide as appropriate  - Encourage maximum independence but intervene and supervise when necessary  - Involve family in performance of ADLs  - Assess for home care needs following discharge   - Consider OT consult to assist with ADL evaluation and planning for discharge  - Provide patient education as appropriate  Outcome: Progressing  Goal: Maintain or return mobility status to optimal level  Description  INTERVENTIONS:  - Assess patient's baseline mobility status (ambulation, transfers, stairs, etc )    - Identify cognitive and physical deficits and behaviors that affect mobility  - Identify mobility aids required to assist with transfers and/or ambulation (gait belt, sit-to-stand, lift, walker, cane, etc )  - [place] fall precautions as indicated by assessment  - Record patient progress and toleration of activity level on Mobility SBAR; progress patient to next Phase/Stage  - Instruct patient to call for assistance with activity based on assessment  - Consider rehabilitation consult to assist with strengthening/weightbearing, etc   Outcome: Progressing     Problem: DISCHARGE PLANNING  Goal: Discharge to home or other facility with appropriate resources  Description  INTERVENTIONS:  - Identify barriers to discharge w/patient and caregiver  - Arrange for needed discharge resources and transportation as appropriate  - Identify discharge learning needs (meds, wound care, etc )  - Refer to Case Management Department for coordinating discharge planning if the patient needs post-hospital services based on physician/advanced practitioner order or complex needs related to functional status, cognitive ability, or social support system   Outcome: Progressing     Problem: Knowledge Deficit  Goal: Patient/family/caregiver demonstrates understanding of disease process, treatment plan, medications, and discharge instructions  Description  Complete learning assessment and assess knowledge base    Interventions:  - Provide teaching at level of understanding  - Provide teaching via preferred learning methods  Outcome: Progressing     Problem: CARDIOVASCULAR - ADULT  Goal: Maintains optimal cardiac output and hemodynamic stability  Description  INTERVENTIONS:  - Monitor I/O, vital signs and rhythm  - Monitor for S/S and trends of decreased cardiac output  - Assess quality of pulses, skin color and temperature  - Assess for signs of decreased coronary artery perfusion  - Instruct patient to report change in severity of symptoms   Outcome: Progressing  Goal: Absence of cardiac dysrhythmias or at baseline rhythm  Description  INTERVENTIONS:  - Continuous cardiac monitoring, vital signs, obtain 12 lead EKG if ordered  - Administer antiarrhythmic and heart rate control medications as ordered  - Monitor electrolytes and administer replacement therapy as ordered  Outcome: Progressing     Problem: GENITOURINARY - ADULT  Goal: Absence of urinary retention  Description  INTERVENTIONS:  - Assess patient's ability to void and empty bladder  - Monitor I/O  - Bladder scan as needed  - Discuss with physician/AP medications to alleviate retention as needed  - Discuss catheterization for long term situations as appropriate   Outcome: Progressing     Problem: METABOLIC, FLUID AND ELECTROLYTES - ADULT  Goal: Electrolytes maintained within normal limits  Description  INTERVENTIONS:  - Monitor labs and assess patient for signs and symptoms of electrolyte imbalances  - Administer electrolyte replacement as ordered  - Monitor response to electrolyte replacements, including repeat lab results as appropriate  - Instruct patient on fluid and nutrition as appropriate  Outcome: Progressing  Goal: Fluid balance maintained  Description  INTERVENTIONS:  - Monitor labs   - Monitor I/O and WT  - Instruct patient on fluid and nutrition as appropriate  - Assess for signs & symptoms of volume excess or deficit  Outcome: Progressing     Problem: SKIN/TISSUE INTEGRITY - ADULT  Goal: Skin integrity remains intact  Description  INTERVENTIONS  - Identify patients at risk for skin breakdown  - Assess and monitor skin integrity  - Assess and monitor nutrition and hydration status  - Monitor labs (i e  albumin)  - Assess for incontinence   - Turn and reposition patient  - Assist with mobility/ambulation  - Relieve pressure over bony prominences  - Avoid friction and shearing  - Provide appropriate hygiene as needed including keeping skin clean and dry  - Evaluate need for skin moisturizer/barrier cream  - Collaborate with interdisciplinary team (i e  Nutrition, Rehabilitation, etc )   - Patient/family teaching  Outcome: Progressing  Goal: Incision(s), wounds(s) or drain site(s) healing without S/S of infection  Description  INTERVENTIONS  - Assess and document risk factors for skin impairment   - Assess and document dressing, incision, wound bed, drain sites and surrounding tissue  - Consider nutrition services referral as needed  - Oral mucous membranes remain intact  - Provide patient/ family education  Outcome: Progressing     Problem: MUSCULOSKELETAL - ADULT  Goal: Maintain or return mobility to safest level of function  Description  INTERVENTIONS:  - Assess patient's ability to carry out ADLs; assess patient's baseline for ADL function and identify physical deficits which impact ability to perform ADLs (bathing, care of mouth/teeth, toileting, grooming, dressing, etc )  - Assess/evaluate cause of self-care deficits   - Assess range of motion  - Assess patient's mobility  - Assess patient's need for assistive devices and provide as appropriate  - Encourage maximum independence but intervene and supervise when necessary  - Involve family in performance of ADLs  - Assess for home care needs following discharge   - Consider OT consult to assist with ADL evaluation and planning for discharge  - Provide patient education as appropriate  Outcome: Progressing

## 2020-01-18 NOTE — PROGRESS NOTES
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 with diminished flow throughout the distal right middle cerebral artery territory branches  Internal carotid arteries without significant stenosis  · +orthostatic hypotension- received some IVF   · Echocardiogram 12/30 shows LVEF 60% with grade 1 diastolic dysfunction   · Fall precautions   · Compression stockings for BLEs edema     Elevated troponin  Assessment & Plan  · Likely due to increase demand from syncopal episode   · Troponin- 0 07, 0 07, 0 07  · EKG shows no ischemic changes  · ACS ruled out   · Cardiology input appreciated   · Patient is asymptomatic       Cognitive impairment  Assessment & Plan  · Continue supportive care       Essential hypertension  Assessment & Plan  · Blood pressure is well controlled  · Not on any medication except furosemide which the patient is taking both for lower extremity edema as well  · Will resume furosemide in AM at 40 mg daily         VTE Pharmacologic Prophylaxis: Pharmacologic: Heparin    Patient Centered Rounds: I have performed bedside rounds with nursing staff today  Discussions with Specialists or Other Care Team Provider: cardiology, nursing, PT/OT, Rx   Education and Discussions with Family / Patient: patient and son     Current Length of Stay: 0 day(s)    Current Patient Status: Inpatient   Certification Statement: The patient will continue to require additional inpatient hospital stay due to patient initially admitted under observation    As the patient is not safe to be discharged after PT and OT evaluation (recommended SNF) the patient status is changed to inpatient    Discharge Plan: pending hospital course     Code Status: Level 1 - Full Code    Subjective:   Feeling better  Objective:     Vitals:   Temp (24hrs), Av 2 °F (36 8 °C), Min:97 5 °F (36 4 °C), Max:99 5 °F (37 5 °C)    Temp:  [97 5 °F (36 4 °C)-99 5 °F (37 5 °C)] 97 5 °F (36 4 °C)  HR:  [71-80] 80  Resp:  [17-18] 18  BP: (110-138)/(58-80) 110/80  SpO2:  [90 %-95 %] 90 %  Body mass index is 31 56 kg/m²  Input and Output Summary (last 24 hours): Intake/Output Summary (Last 24 hours) at 2020 1437  Last data filed at 2020 1400  Gross per 24 hour   Intake 240 ml   Output 800 ml   Net -560 ml       Physical Exam:     Physical Exam   Constitutional: She is oriented to person, place, and time  She appears well-developed  No distress  Frail      HENT:   Head: Normocephalic and atraumatic  Mouth/Throat: Oropharynx is clear and moist    Eyes: Pupils are equal, round, and reactive to light  Conjunctivae and EOM are normal    Neck: Normal range of motion  Neck supple  No thyromegaly present  Cardiovascular: Normal rate, regular rhythm and normal heart sounds  Exam reveals no gallop and no friction rub  No murmur heard  Pulmonary/Chest: Effort normal and breath sounds normal  She has no wheezes  She has no rales  Abdominal: Soft  Bowel sounds are normal  She exhibits no distension  There is no tenderness  There is no rebound  Musculoskeletal: Normal range of motion  She exhibits no edema (non-pitting BLEs), tenderness or deformity  Neurological: She is alert and oriented to person, place, and time  No cranial nerve deficit  Skin: Skin is warm and dry  No rash noted  No erythema  Psychiatric: She has a normal mood and affect  Her behavior is normal  Thought content normal    Vitals reviewed        Additional Data:     Labs:    Results from last 7 days   Lab Units 01/18/20  0513   WBC Thousand/uL 9 10   HEMOGLOBIN g/dL 14 5   HEMATOCRIT % 43 9   PLATELETS Thousands/uL 225   NEUTROS PCT % 70   LYMPHS PCT % 19*   MONOS PCT % 8   EOS PCT % 3     Results from last 7 days   Lab Units 01/18/20  0513   POTASSIUM mmol/L 3 6   CHLORIDE mmol/L 106   CO2 mmol/L 30   BUN mg/dL 12   CREATININE mg/dL 0 64   CALCIUM mg/dL 9 0   ALK PHOS U/L 100   ALT U/L 8   AST U/L 11*                   * I Have Reviewed All Lab Data Listed Above  * Additional Pertinent Lab Tests Reviewed: Travis 66 Admission  Reviewed    Imaging:  Imaging Reports Reviewed Today Include: n/a     Recent Cultures (last 7 days):           Last 24 Hours Medication List:     Current Facility-Administered Medications:  acetaminophen 650 mg Oral Q6H PRN VALDEZ Casanova   aspirin 81 mg Oral Daily VALDEZ Casanova   docusate sodium 100 mg Oral BID VALDEZ Casanova   enoxaparin 40 mg Subcutaneous Daily VALDEZ Casanova   [START ON 1/19/2020] furosemide 40 mg Oral Daily VALDEZ Casanova   latanoprost 1 drop Right Eye HS VALDEZ Casanova   multivitamin-minerals 1 tablet Oral Daily VALDEZ Casanova   pravastatin 40 mg Oral Daily With Dinner VALDEZ Casanova   SOOTHE NIGHTTIME 1 application Right Eye Daily VALDEZ Casanova   timolol 1 drop Right Eye Daily VALDEZ Casanova   triamcinolone  Topical BID PRN VALDEZ Casanova        Today, Patient Was Seen By: VALDEZ Casanova    ** Please Note: Dictation voice to text software may have been used in the creation of this document   **

## 2020-01-18 NOTE — NURSING NOTE
Pt alert and oriented, pleasant and cooperative  Heart is nsr on telemetry  Denies chest pain, palpitation, dizzyness or sob  Currently resting comfortably in bed  Hourly rounding discussed, no needs at this time  Call bell within reach

## 2020-01-18 NOTE — PHYSICAL THERAPY NOTE
Physical Therapy Evaluation     Patient's Name: Dana Armstrong    Admitting Diagnosis  Vasovagal syncope [R55]  Syncope [R55]  Convulsive syncope [R55]  Elevated troponin [R79 89]    Problem List  Patient Active Problem List   Diagnosis    Hyperlipidemia    PVD (peripheral vascular disease) ([address] )    Cough    Essential hypertension    Edema of both lower extremities due to peripheral venous insufficiency    Stroke-like symptom    Acute cystitis without hematuria    Cognitive impairment    Other constipation    Syncope    Elevated troponin       Past Medical History  Past Medical History:   Diagnosis Date    Blind left eye     Fx of fibula     Hypertension        Past Surgical History  Past Surgical History:   Procedure Laterality Date    EYE SURGERY      HYSTERECTOMY      THYROID SURGERY          01/18/20 0816   Note Type   Note type Eval/Treat   Pain Assessment   Pain Assessment No/denies pain   Pain Score No Pain   Home Living   Type of Home Assisted living   Home Layout One level;Performs ADLs on one level; Able to live on main level with bedroom/bathroom; Access; Ramped entrance   [address] chair   [address] [facility]; Wheelchair-manual   Additional Comments Pt  reports w/c usage and RW for transfers  Prior Function   Level of [place] Needs assistance with IADLs; Needs assistance with ADLs and functional mobility   Lives With Facility staff   Receives Help From Personal care attendant   ADL Assistance Needs assistance   IADLs Needs assistance   Falls in the last 6 months 0   Vocational Retired   Restrictions/Precautions   [facility] Bearing Precautions Per Order No   Other Precautions Telemetry; Fall Risk;Bed Alarm;Hard of hearing;Visual impairment  (L cataract)   General   Family/Caregiver Present No   Cognition   Overall Cognitive Status Impaired   Arousal/Participation Alert Orientation Level Oriented to person;Oriented to place; Disoriented to time;Disoriented to situation   Memory Decreased recall of recent events;Decreased recall of precautions   Following Commands Follows one step commands with increased time or repetition   RUE Assessment   RUE Assessment   (Please see OT assessment )   LUE Assessment   LUE Assessment   (Please see OT assessment )   RLE Assessment   RLE Assessment X  (3/5 gross musculature)   LLE Assessment   LLE Assessment X  (3/5 gross musculature)   Coordination   Movements are Fluid and Coordinated 0   Light Touch   RLE Light Touch Grossly intact   LLE Light Touch Grossly intact   Sharp/Dull   RLE Sharp/Dull Grossly intact   LLE Sharp/Dull Grossly intact   Bed Mobility   Supine to Sit 3  Moderate assistance   Additional items Assist x 2;Bedrails; Increased time required;Verbal cues;LE management   Sit to Supine 3  Moderate assistance   Additional items Assist x 2;Bedrails; Increased time required;Verbal cues;LE management   Transfers   Sit to Stand 3  Moderate assistance   Additional items Assist x 2;Bedrails; Increased time required;Verbal cues  (x 3 trials)   Stand to Sit 3  Moderate assistance   Additional items Assist x 2;Bedrails; Impulsive;Verbal cues   Stand pivot Unable to assess  (2/2 safety concerns, medical complications i e dizziness)   Ambulation/Elevation   Gait pattern Not appropriate; Not tested  (initiated, however pt  could not sidestep toward [facility])   Balance   Static Sitting Fair   Dynamic Sitting Fair -   Static Standing Poor -   Dynamic Standing   (unable to assess)   Endurance Deficit   Endurance Deficit Yes   Activity Tolerance   Activity Tolerance Patient limited by fatigue;Treatment limited secondary to medical complications (Comment)   Medical Staff Made Aware yes, [address]   Nurse Made Aware yes,[facility] [employer] [facility]   Assessment   Prognosis Fair   Problem List Decreased strength;Decreased endurance; Impaired balance;Decreased mobility; Decreased coordination;Decreased cognition; Impaired judgement;Decreased safety awareness; Obesity   Assessment Pt is 80 y o  female seen for PT evaluation s/p admit to [address] on 1/17/2020 w/ Syncope  PT consulted to assess pt's functional mobility and d/c needs  Order placed for PT eval and tx, w/ activity as tolerated order  Comorbidities affecting pt's physical performance at time of assessment include: weakness, syncope, HTN, cognitive impairment,PVD  PTA, pt was resident of [facility]  Personal factors affecting pt at time of IE include: communication issues, inability to ambulate household distances, inability to navigate community distances, inability to navigate level surfaces w/o external assistance, unable to perform dynamic tasks in community, decreased cognition, hearing impairments, visual impairments, decreased initiation and engagement and limited insight into impairments  Please find objective findings from PT assessment regarding body systems outlined above with impairments and limitations including weakness, impaired balance, decreased endurance, impaired coordination, gait deviations, decreased activity tolerance, decreased functional mobility tolerance, decreased safety awareness, impaired judgement, fall risk and decreased cognition  The following objective measures performed on IE also reveal limitations: Barthel Index: 20/100  Pt's clinical presentation is currently unstable/unpredictable  Pt to benefit from continued PT tx to address deficits as defined above and maximize level of functional independent mobility and consistency  From PT/mobility standpoint, recommendation at time of d/c would be STR pending progress in order to facilitate return to PLOF  Goals   Patient Goals get some rest   LTG Expiration Date 01/28/20   Long Term Goal #1 1 )Patient will complete bed mobility with min A of 1 for decrease need for caregiver assistance, decrease burden of care   2 ) Patient will complete transfers with min A of 2 to decrease risk of falls, facilitate upright standing posture  3 ) BLE strength to greater than/equal to 4-/5 gross musculature to increase ability to safely transfer, control descent to chair  4 ) Patient will exhibit increase static standing balance to Fair, for 30-45 seconds without LOB and min A of 2 to improve activity tolerance & endurance  5 ) Patient will exhibit increase dynamic ambulatory balance to Fair for 25-50 feet w/RW min A of 2 to improve ability to mobilize to toilet, chair and decrease risk for additional medical complications  6 ) Patient will exhibit good self monitoring and ability to follow 2 step commands to increase complexity of tasks and resume ADL's without LOB  PT Treatment Day 1   Plan   Treatment/Interventions Functional transfer training;LE strengthening/ROM; Therapeutic exercise; Endurance training;Patient/family training;Equipment eval/education; Bed mobility;Gait training;Spoke to nursing;OT;Spoke to advanced practitioner   PT Frequency Other (Comment)  (3-5x/wk)   Recommendation   Recommendation Short-term skilled PT   Equipment Recommended Walker  (pt  has own)   PT - OK to Discharge Yes  (if medically stable to STR)   Additional Comments Upon conclusion, pt  was resting in bed w/all needs within reach, nursing staff at bedside, and SCD's active  Barthel Index   Feeding 5   Bathing 0   Grooming Score 0   Dressing Score 5   Bladder Score 0   Bowels Score 5   Toilet Use Score 0   Transfers (Bed/Chair) Score 5   Mobility (Level Surface) Score 0   Stairs Score 0   Barthel Index Score 20     Additional skilled interventions: Therapeutic Activity x 10 minutes    S: No reported pain prior or during session, A&Ox2      O: therapeutic activity x 10 minutes including mobilization education: bed mobility, supine<->sit, static/dynamic sitting balance w/ postural facilitation, sit<->stand transfers, static/dynamic standing balance w/ postural facilitation,and continued safety training for increased environmental awareness and impulsivity  A: Patient exhibited weakness, impaired balance, gait dysfunction, decreased endurance, activity intolerance, and decline from PLOF to benefit from continued interventions  P: Continue PT tx with progression of functional tasks as patient can safely tolerate, 3-5x/week      Jaclyn Simpson, PT

## 2020-01-18 NOTE — ASSESSMENT & PLAN NOTE
· Blood pressure is well controlled  · Not on any medication except furosemide which the patient is taking both for lower extremity edema as well  · Will resume furosemide in AM at 40 mg daily

## 2020-01-18 NOTE — SOCIAL WORK
CM met with pt at bedside and spoke with pt son Luis Jacobs via phone to discuss discharge planning  Pt is a resident of [facility]  She is mostly w/c bound and does not ambulate much at the facility per son  STR recommended by PT after evaluation  CM discussed same with son  Son is not in agreement with STR  He does not see the benefit of STR as pt has not ambulated much for some time  CM informed son we will discuss recommendation of STR and pt return to the [facility] with their administration on Monday to determine pt can return  Pt has been getting PT at the facility and son would like same to continue upon return  Pt will need w/c [facility] transport back to [facility]  CM to follow  CM reviewed d/c planning process including the following: identifying help at home, patient preference for d/c planning needs, availability of treatment team to discuss questions or concerns patient and/or family may have regarding understanding medications and recognizing signs and symptoms once discharged  CM also encouraged patient to follow up with all recommended appointments after discharge  Patient advised of importance for patient and family to participate in managing patients medical well being

## 2020-01-18 NOTE — NURSING NOTE
One attempt to get out of bed unassisted  Thought she had to go meet someone to go to [facility], thought it was 8 am  Reoriented, repositioned back in bed, no needs at this time  Call bell within reach

## 2020-01-18 NOTE — ASSESSMENT & PLAN NOTE
· Likely vasovagal response from having a bowel movement and orthostatic hypotension  · The patient presented recently with stroke-like symptom and CTA head and neck 12/28 shows 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 with diminished flow throughout the distal right middle cerebral artery territory branches  Internal carotid arteries without significant stenosis     · +orthostatic hypotension- received some IVF   · Echocardiogram 12/30 shows LVEF 60% with grade 1 diastolic dysfunction   · Fall precautions   · Compression stockings for BLEs edema

## 2020-01-19 LAB
ATRIAL RATE: 83 BPM
ATRIAL RATE: 83 BPM
P AXIS: 38 DEGREES
P AXIS: 49 DEGREES
PR INTERVAL: 138 MS
PR INTERVAL: 140 MS
QRS AXIS: -39 DEGREES
QRS AXIS: -40 DEGREES
QRSD INTERVAL: 132 MS
QRSD INTERVAL: 132 MS
QT INTERVAL: 386 MS
QT INTERVAL: 394 MS
QTC INTERVAL: 453 MS
QTC INTERVAL: 462 MS
T WAVE AXIS: 15 DEGREES
T WAVE AXIS: 4 DEGREES
VENTRICULAR RATE: 83 BPM
VENTRICULAR RATE: 83 BPM

## 2020-01-19 PROCEDURE — 97530 THERAPEUTIC ACTIVITIES: CPT

## 2020-01-19 PROCEDURE — 93010 ELECTROCARDIOGRAM REPORT: CPT | Performed by: INTERNAL MEDICINE

## 2020-01-19 PROCEDURE — 99232 SBSQ HOSP IP/OBS MODERATE 35: CPT | Performed by: NURSE PRACTITIONER

## 2020-01-19 RX ORDER — FUROSEMIDE 40 MG/1
40 TABLET ORAL
Status: DISCONTINUED | OUTPATIENT
Start: 2020-01-20 | End: 2020-01-21 | Stop reason: HOSPADM

## 2020-01-19 RX ADMIN — ASPIRIN 81 MG 81 MG: 81 TABLET ORAL at 09:23

## 2020-01-19 RX ADMIN — ENOXAPARIN SODIUM 40 MG: 40 INJECTION SUBCUTANEOUS at 09:23

## 2020-01-19 RX ADMIN — PRAVASTATIN SODIUM 40 MG: 40 TABLET ORAL at 16:06

## 2020-01-19 RX ADMIN — Medication 1 APPLICATION: at 09:24

## 2020-01-19 RX ADMIN — LATANOPROST 1 DROP: 50 SOLUTION OPHTHALMIC at 21:40

## 2020-01-19 RX ADMIN — TIMOLOL MALEATE 1 DROP: 5 SOLUTION OPHTHALMIC at 09:24

## 2020-01-19 RX ADMIN — FUROSEMIDE 40 MG: 40 TABLET ORAL at 09:23

## 2020-01-19 RX ADMIN — Medication 1 TABLET: at 09:23

## 2020-01-19 RX ADMIN — DOCUSATE SODIUM 100 MG: 100 CAPSULE, LIQUID FILLED ORAL at 09:23

## 2020-01-19 RX ADMIN — DOCUSATE SODIUM 100 MG: 100 CAPSULE, LIQUID FILLED ORAL at 17:28

## 2020-01-19 NOTE — PROGRESS NOTES
Progress Note - Hayley Segal 6/15/1923, 80 y o  female MRN: [id number]    Unit/Bed#: -02 Encounter: [id number]    Primary Care Provider: No primary care provider on file  Date and time admitted to hospital: 1/17/2020 10:22 AM        * Syncope  Assessment & Plan  · Likely vasovagal response from having a bowel movement and orthostatic hypotension  · The patient presented recently with stroke-like symptom and CTA head and neck 12/28 shows 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 with diminished flow throughout the distal right middle cerebral artery territory branches  Internal carotid arteries without significant stenosis  · +orthostatic hypotension- received some IVF   · Echocardiogram 12/30 shows LVEF 60% with grade 1 diastolic dysfunction   · Fall precautions   · Compression stockings for BLEs edema   · Liberalize salt intake     Elevated troponin  Assessment & Plan  · Likely due to increase demand from syncopal episode   · Troponin- 0 07, 0 07, 0 07  · EKG shows no ischemic changes  · ACS ruled out   · Cardiology input appreciated   · Patient is asymptomatic       Cognitive impairment  Assessment & Plan  · Continue supportive care         Essential hypertension  Assessment & Plan  · Blood pressure is well controlled  · Not on any medication except furosemide which the patient is taking both for lower extremity edema as well  · Continue lasix at 40 mg every other day         VTE Pharmacologic Prophylaxis: Pharmacologic: Enoxaparin (Lovenox)    Patient Centered Rounds: I have performed bedside rounds with nursing staff today      Discussions with Specialists or Other Care Team Provider: nursing, PT/OT, Rx   Education and Discussions with Family / Patient: patient and son     Current Length of Stay: 1 day(s)    Current Patient Status: Inpatient   Certification Statement: The patient will continue to require additional inpatient hospital stay due to syncope     Discharge Plan: pending hospital course     Code Status: Level 1 - Full Code    Subjective:   Feeling good  Denies any pain  Objective:     Vitals:   Temp (24hrs), Av 3 °F (36 8 °C), Min:97 9 °F (36 6 °C), Max:99 °F (37 2 °C)    Temp:  [97 9 °F (36 6 °C)-99 °F (37 2 °C)] 97 9 °F (36 6 °C)  HR:  [65-71] 68  Resp:  [16-20] 20  BP: (115-127)/(54-57) 127/54  SpO2:  [92 %-93 %] 93 %  Body mass index is 31 56 kg/m²  Input and Output Summary (last 24 hours): Intake/Output Summary (Last 24 hours) at 2020 1142  Last data filed at 2020 1700  Gross per 24 hour   Intake 240 ml   Output 400 ml   Net -160 ml       Physical Exam:     Physical Exam   Constitutional: She is oriented to person, place, and time  She appears well-developed  No distress  Frail      HENT:   Head: Normocephalic and atraumatic  Mouth/Throat: Oropharynx is clear and moist    Eyes: Pupils are equal, round, and reactive to light  Conjunctivae and EOM are normal    Neck: Normal range of motion  Neck supple  No thyromegaly present  Cardiovascular: Normal rate, regular rhythm and normal heart sounds  Exam reveals no gallop and no friction rub  No murmur heard  Pulmonary/Chest: Effort normal and breath sounds normal  She has no wheezes  She has no rales  Abdominal: Soft  Bowel sounds are normal  She exhibits no distension  There is no tenderness  There is no rebound  Musculoskeletal: Normal range of motion  She exhibits edema (non-pitting edema )  She exhibits no tenderness or deformity  Neurological: She is alert and oriented to person, place, and time  No cranial nerve deficit  Skin: Skin is warm and dry  No rash noted  No erythema  Psychiatric: She has a normal mood and affect  Her behavior is normal  Thought content normal    Vitals reviewed        Additional Data:     Labs:    Results from last 7 days   Lab Units 20  0513   WBC Thousand/uL 9 10   HEMOGLOBIN g/dL 14 5   HEMATOCRIT % 43 9   PLATELETS Thousands/uL 225   NEUTROS PCT % 70   LYMPHS PCT % 19*   MONOS PCT % 8   EOS PCT % 3     Results from last 7 days   Lab Units 01/18/20  0513   POTASSIUM mmol/L 3 6   CHLORIDE mmol/L 106   CO2 mmol/L 30   BUN mg/dL 12   CREATININE mg/dL 0 64   CALCIUM mg/dL 9 0   ALK PHOS U/L 100   ALT U/L 8   AST U/L 11*                   * I Have Reviewed All Lab Data Listed Above  * Additional Pertinent Lab Tests Reviewed: Travis 66 Admission  Reviewed    Imaging:  Imaging Reports Reviewed Today Include: n/a     Recent Cultures (last 7 days):           Last 24 Hours Medication List:     Current Facility-Administered Medications:  acetaminophen 650 mg Oral Q6H PRN KalaVALDEZ Ma   aspirin 81 mg Oral Daily VALDEZ Hartman   docusate sodium 100 mg Oral BID VALDEZ Hartman   enoxaparin 40 mg Subcutaneous Daily VALDEZ Hartman   [START ON 1/20/2020] furosemide 40 mg Oral Q48H VALDEZ Treadwell   latanoprost 1 drop Right Eye HS VALDEZ Hartman   multivitamin-minerals 1 tablet Oral Daily VALDEZ Hartman   pravastatin 40 mg Oral Daily With [employer], VALDEZ   SOOTHE NIGHTTIME 1 application Right Eye Daily KalaVALDEZ Ma   timolol 1 drop Right Eye Daily VALDEZ Hartman   triamcinolone  Topical BID PRN VALDEZ Hartman        Today, Patient Was Seen By: VALDEZ Hartman    ** Please Note: Dictation voice to text software may have been used in the creation of this document   **

## 2020-01-19 NOTE — NURSING NOTE
Family at bedside  Pt remains incontinent of urine with external catheter in place  Alert and oriented x 2-3  Trace edema to ble  Chest clear on RA  Appetite fair

## 2020-01-19 NOTE — ASSESSMENT & PLAN NOTE
· Blood pressure is well controlled  · Not on any medication except furosemide which the patient is taking both for lower extremity edema as well  · Continue lasix at 40 mg every other day

## 2020-01-19 NOTE — PHYSICAL THERAPY NOTE
Physical Therapy Treatment Session Note    Patient's Name: Tessy Part    Admitting Diagnosis  Vasovagal syncope [R55]  Syncope [R55]  Convulsive syncope [R55]  Elevated troponin [R79 89]    Problem List  Patient Active Problem List   Diagnosis    Hyperlipidemia    PVD [facility]peripheral vascular disease) ([facility] [address] )    Cough    Essential hypertension    Edema of both lower extremities due to peripheral venous insufficiency    Stroke-like symptom    Acute cystitis without hematuria    Cognitive impairment    Other constipation    Syncope    Elevated troponin       Past Medical History  Past Medical History:   Diagnosis Date    Blind left eye     Fx of fibula     Hypertension        Past Surgical History  Past Surgical History:   Procedure Laterality Date    EYE SURGERY      HYSTERECTOMY      THYROID SURGERY          01/19/20 1058   Pain Assessment   Pain Assessment No/denies pain   Pain Score No Pain   Restrictions/Precautions   Weight Bearing Precautions Per Order No   Other Precautions Fall Risk;Bed Alarm;Cognitive;Visual impairment;Hard of hearing   General   Chart Reviewed Yes   Response to Previous Treatment Patient unable to report, no changes reported from family or staff   Family/Caregiver Present No   Cognition   Overall Cognitive Status Impaired   Arousal/Participation Alert; Cooperative   Attention Attends with cues to redirect   Orientation Level Oriented to person;Oriented to place; Disoriented to time;Disoriented to situation   Memory Decreased recall of recent events;Decreased recall of precautions   Following Commands Follows one step commands with increased time or repetition   Subjective   Subjective "I'm doing ok"   Bed Mobility   Rolling R 3  Moderate assistance   Additional items Assist x 1;HOB elevated; Bedrails;Verbal cues;LE management   Rolling L 3  Moderate assistance   Supine to Sit 3  Moderate assistance   Additional items Assist x 1;HOB elevated; Bedrails; Increased time required;Verbal cues;LE management   Sit to Supine 3  Moderate assistance   Additional items Assist x 2;Bedrails; Increased time required;Verbal cues;LE management   Transfers   Sit to Stand 2  Maximal assistance   Additional items Assist x 2;Bedrails; Increased time required;Verbal cues  (x 3 trials)   Stand to Sit 2  Maximal assistance  (stood <5 seconds each trial)   Additional items Assist x 2;Bedrails; Impulsive;Verbal cues  (x 3 trials)   Stand pivot Unable to assess  (2/2 safety concerns,impulsivity)   Ambulation/Elevation   Gait pattern Not appropriate; Not tested  (pt  could not negotiate a step toward [address])   Balance   Static Sitting Fair   Dynamic Sitting Fair -   Static Standing Poor +   Dynamic Standing   (unable to assess)   Endurance Deficit   Endurance Deficit Yes   Activity Tolerance   Activity Tolerance Patient limited by fatigue   Medical Staff Made Aware yes   Nurse Made Aware yes   Assessment   Prognosis Fair   Problem List Decreased strength;Decreased endurance; Impaired balance;Decreased mobility; Decreased coordination;Decreased cognition; Impaired judgement;Decreased safety awareness; Obesity   Assessment Pt seen for PT treatment session this date with interventions consisting of therapeutic activity consisting of training: bed mobility, supine<>sit transfers, sit<>stand transfers, static sitting tolerance at EOB for 10 minutes w/ B UE support, static standing tolerance for <1 minutes w/ B UE support, vc and tactile cues for static sitting posture faciliation and vc and tactile cues for static standing posture faciliation  Pt agreeable to PT treatment session upon arrival, pt found supine in bed w/ HOB elevated, in no apparent distress and A&O x 2  In comparison to previous session, pt with no improvements as evidenced by inability to progress functional tasks,impulsivity   Post session: pt returned BTB, bed alarm engaged, all needs in reach and RN notified of session findings/recommendations Continue to recommend STR at time of d/c in order to maximize pt's functional independence and safety w/ mobility  Pt continues to be functioning below baseline level, and remains limited 2* factors listed above and including weakness, impaired balance, decreased endurance, impulsivity,decreased activity tolerance  PT will continue to see pt while here in order to address the deficits listed above and provide interventions consistent w/ POC in effort to achieve LTGs  Goals   Patient Goals have some rest   LTG Expiration Date 01/28/20   Long Term Goal #1 LTGs remain appropriate   PT Treatment Day 2   Plan   Treatment/Interventions Functional transfer training;LE strengthening/ROM; Therapeutic exercise; Endurance training;Cognitive reorientation;Patient/family training;Equipment eval/education; Bed mobility;Gait training;Spoke to nursing;OT   Progress No functional improvements   PT Frequency Other (Comment)  (3-5x/wk)   Recommendation   Recommendation Short-term skilled PT   Equipment Recommended Walker   PT - OK to Discharge Yes  (if medically stable to STR)   Additional Comments Upon conclusion, pt  was resting in bed w/all needs within reach,bed alarm engaged and SCD's active         Jacob Orantes, PT

## 2020-01-19 NOTE — OCCUPATIONAL THERAPY NOTE
Occupational Therapy Treatment Note      Rollo Dubin    1/19/2020    Principal Problem:    Syncope  Active Problems:    Essential hypertension    Cognitive impairment    Elevated troponin      Past Medical History:   Diagnosis Date    Blind left eye     Fx of fibula     Hypertension        Past Surgical History:   Procedure Laterality Date    EYE SURGERY      HYSTERECTOMY      THYROID SURGERY          01/19/20 1100   Restrictions/Precautions   Weight Bearing Precautions Per Order No   Other Precautions Bed Alarm; Fall Risk   Pain Assessment   Pain Assessment No/denies pain   Pain Score No Pain   Bed Mobility   Rolling R 5  Supervision   Additional items Assist x 1;HOB elevated; Bedrails;Verbal cues;LE management   Rolling L 3  Moderate assistance   Supine to Sit 3  Moderate assistance   Additional items Assist x 1;HOB elevated; Bedrails; Increased time required;Verbal cues;LE management   Sit to Supine 3  Moderate assistance   Additional items Assist x 2;Bedrails; Increased time required;Verbal cues;LE management   Transfers   Sit to Stand 2  Maximal assistance   Additional items Assist x 2;Bedrails; Increased time required;Verbal cues  (x3 trials)   Stand to Sit 2  Maximal assistance  (stood <5 seconds each trial)   Additional items Assist x 2;Bedrails; Impulsive;Verbal cues  (x3 trials)   Stand pivot Unable to assess  (due to safety concerns)   Cognition   Overall Cognitive Status Impaired   Arousal/Participation Alert; Cooperative   Attention Attends with cues to redirect   Orientation Level Oriented to person;Oriented to place; Disoriented to time;Disoriented to situation   Memory Decreased recall of recent events;Decreased recall of precautions   Following Commands Follows one step commands with increased time or repetition   Activity Tolerance   Activity Tolerance Patient limited by fatigue   Assessment   Assessment Patient participated in Skilled OT session this date with interventions consisting of safety awareness and fall prevention techniques   Patient agreeable to OT treatment session, upon arrival patient was found supine in bed  Patient requiring verbal cues for safety and frequent rest periods  Patient continues to be functioning below baseline level, occupational performance remains limited secondary to factors listed above and increased risk for falls and injury  From OT standpoint, recommendation at time of d/c would be Short Term Rehab  Patient to benefit from continued Occupational Therapy treatment while in the hospital to address deficits as defined above and maximize level of functional independence with ADLs and functional mobility  Plan   Treatment Interventions UE strengthening/ROM;ADL retraining;Functional transfer training; Endurance training;Cognitive reorientation;Patient/family training; Activityengagement   Goal Expiration Date 01/28/20   OT Treatment Day 1   OT Frequency 3-5x/wk   Recommendation   OT Discharge Recommendation Short Term Rehab     Yanira Tyler MS, OTR/L

## 2020-01-19 NOTE — PLAN OF CARE
Problem: OCCUPATIONAL THERAPY ADULT  Goal: Performs self-care activities at highest level of function for planned discharge setting  See evaluation for individualized goals  Description  Treatment Interventions: UE strengthening/ROM, ADL retraining, Functional transfer training, Endurance training, Cognitive reorientation, Patient/family training, Activityengagement          See flowsheet documentation for full assessment, interventions and recommendations  Outcome: Not Progressing  Note:   Limitation: Decreased ADL status, Decreased UE strength, Decreased cognition, Decreased endurance, Decreased self-care trans  Prognosis: Good  Assessment: Patient participated in Skilled OT session this date with interventions consisting of safety awareness and fall prevention techniques   Patient agreeable to OT treatment session, upon arrival patient was found supine in bed  Patient requiring verbal cues for safety and frequent rest periods  Patient continues to be functioning below baseline level, occupational performance remains limited secondary to factors listed above and increased risk for falls and injury  From OT standpoint, recommendation at time of d/c would be Short Term Rehab  Patient to benefit from continued Occupational Therapy treatment while in the hospital to address deficits as defined above and maximize level of functional independence with ADLs and functional mobility       OT Discharge Recommendation: Short Term Rehab  OT - OK to Discharge: (to [facility] when medically stable)    Yanira Pabon, MS, OTR/L

## 2020-01-19 NOTE — ASSESSMENT & PLAN NOTE
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

## 2020-01-19 NOTE — PLAN OF CARE
Problem: PHYSICAL THERAPY ADULT  Goal: Performs mobility at highest level of function for planned discharge setting  See evaluation for individualized goals  Description  Treatment/Interventions: Functional transfer training, LE strengthening/ROM, Therapeutic exercise, Endurance training, Patient/family training, Equipment eval/education, Bed mobility, Gait training, Spoke to nursing, OT, Spoke to advanced practitioner  Equipment Recommended: Walker(pt  has own)       See flowsheet documentation for full assessment, interventions and recommendations  Outcome: Not Progressing  Note:   Prognosis: Fair  Problem List: Decreased strength, Decreased endurance, Impaired balance, Decreased mobility, Decreased coordination, Decreased cognition, Impaired judgement, Decreased safety awareness, Obesity  Assessment: Pt seen for PT treatment session this date with interventions consisting of therapeutic activity consisting of training: bed mobility, supine<>sit transfers, sit<>stand transfers, static sitting tolerance at EOB for 10 minutes w/ B UE support, static standing tolerance for <1 minutes w/ B UE support, vc and tactile cues for static sitting posture faciliation and vc and tactile cues for static standing posture faciliation  Pt agreeable to PT treatment session upon arrival, pt found supine in bed w/ HOB elevated, in no apparent distress and A&O x 2  In comparison to previous session, pt with no improvements as evidenced by inability to progress functional tasks,impulsivity  Post session: pt returned BTB, bed alarm engaged, all needs in reach and RN notified of session findings/recommendations Continue to recommend STR at time of d/c in order to maximize pt's functional independence and safety w/ mobility  Pt continues to be functioning below baseline level, and remains limited 2* factors listed above and including weakness, impaired balance, decreased endurance, impulsivity,decreased activity tolerance   PT will continue to see pt while here in order to address the deficits listed above and provide interventions consistent w/ POC in effort to achieve LTGs  Recommendation: Short-term skilled PT     PT - OK to Discharge: Yes(if medically stable to STR)    See flowsheet documentation for full assessment

## 2020-01-20 PROBLEM — I10 ESSENTIAL HYPERTENSION: Status: ACTIVE | Noted: 2019-05-25

## 2020-01-20 PROCEDURE — 97110 THERAPEUTIC EXERCISES: CPT

## 2020-01-20 PROCEDURE — 97530 THERAPEUTIC ACTIVITIES: CPT

## 2020-01-20 PROCEDURE — 99232 SBSQ HOSP IP/OBS MODERATE 35: CPT | Performed by: NURSE PRACTITIONER

## 2020-01-20 RX ADMIN — TIMOLOL MALEATE 1 DROP: 5 SOLUTION OPHTHALMIC at 08:57

## 2020-01-20 RX ADMIN — DOCUSATE SODIUM 100 MG: 100 CAPSULE, LIQUID FILLED ORAL at 17:22

## 2020-01-20 RX ADMIN — LATANOPROST 1 DROP: 50 SOLUTION OPHTHALMIC at 21:07

## 2020-01-20 RX ADMIN — Medication 1 TABLET: at 08:58

## 2020-01-20 RX ADMIN — DOCUSATE SODIUM 100 MG: 100 CAPSULE, LIQUID FILLED ORAL at 08:58

## 2020-01-20 RX ADMIN — ASPIRIN 81 MG 81 MG: 81 TABLET ORAL at 08:58

## 2020-01-20 RX ADMIN — ENOXAPARIN SODIUM 40 MG: 40 INJECTION SUBCUTANEOUS at 08:57

## 2020-01-20 RX ADMIN — FUROSEMIDE 40 MG: 40 TABLET ORAL at 08:58

## 2020-01-20 RX ADMIN — PRAVASTATIN SODIUM 40 MG: 40 TABLET ORAL at 17:22

## 2020-01-20 RX ADMIN — Medication 1 APPLICATION: at 08:57

## 2020-01-20 NOTE — PLAN OF CARE
Problem: PHYSICAL THERAPY ADULT  Goal: Performs mobility at highest level of function for planned discharge setting  See evaluation for individualized goals  Description  Treatment/Interventions: Functional transfer training, LE strengthening/ROM, Therapeutic exercise, Endurance training, Patient/family training, Equipment eval/education, Bed mobility, Gait training, Spoke to nursing, OT, Spoke to advanced practitioner  Equipment Recommended: Walker(pt  has own)       See flowsheet documentation for full assessment, interventions and recommendations  Outcome: Progressing  Note:   Prognosis: Fair  Problem List: Decreased strength, Decreased endurance, Impaired balance, Decreased mobility, Decreased coordination, Decreased cognition, Impaired judgement, Decreased safety awareness, Obesity  Assessment: Pt seen for PT treatment session this date with interventions consisting of therapeutic activity consisting of training: bed mobility, supine<>sit transfers, static sitting tolerance at EOB for 10 minutes w/ B UE support and vc and tactile cues for static sitting posture faciliation  Pt agreeable to PT treatment session upon arrival, pt found supine in bed w/ HOB elevated, in no apparent distress and responsive  In comparison to previous session, pt with improvements in tolerating increased static unsupported sitting attempt at EOB  Pt continue to be limited to OOB activity 2/2 lightheadedness  Post session: pt returned BTB, bed alarm engaged, all needs in reach and RN notified of session findings/recommendations  Continue to recommend STR at time of d/c in order to maximize pt's functional independence and safety w/ mobility  Pt continues to be functioning below baseline level, and remains limited 2* factors listed abovfe  PT will continue to see pt while here in order to address the deficits listed above and provide interventions consistent w/ POC in effort to achieve STGs  Barriers to Discharge:  Other (Comment)(pt from [facility])     Recommendation: Short-term skilled PT     PT - OK to Discharge: Yes(when medically cleared, if to STR)    See flowsheet documentation for full assessment

## 2020-01-20 NOTE — ASSESSMENT & PLAN NOTE
· Likely due to increase demand from syncopal episode   · Troponin- 0 07, 0 07, 0 07  · EKG shows no ischemic changes  · ACS ruled out   · Cardiology input appreciated   · Apply Jakob stockings  · Patient is asymptomatic

## 2020-01-20 NOTE — PHYSICAL THERAPY NOTE
Physical Therapy Treatment Note       01/20/20 0839   Pain Assessment   Pain Assessment No/denies pain   Pain Score No Pain   Restrictions/Precautions   Weight Bearing Precautions Per Order No   Other Precautions Bed Alarm; Fall Risk;Cognitive   General   Chart Reviewed Yes   Response to Previous Treatment Patient with no complaints from previous session  ;Patient reporting fatigue but able to participate  Family/Caregiver Present No   Cognition   Arousal/Participation Alert; Responsive; Cooperative   Attention Attends with cues to redirect   Orientation Level Oriented to person;Oriented to place   Memory Decreased recall of recent events;Decreased recall of precautions   Following Commands Follows one step commands with increased time or repetition   Comments pt agreeable to PT session   Subjective   Subjective "I'm doing ok, I'm hungry"   Bed Mobility   Rolling R 5  Supervision   Additional items Assist x 1;HOB elevated; Bedrails; Increased time required   Supine to Sit 4  Minimal assistance   Additional items Assist x 1;HOB elevated; Bedrails; Increased time required;Verbal cues   Sit to Supine 3  Moderate assistance   Additional items Assist x 2; Increased time required;Verbal cues;LE management   Additional Comments static sitting tolerance at EOB = 10 mins, (+) dizziness/lightheadedness during  BP Taken = 138/69, HR 86 bpm  Pt reports at baseline she transfers c use of RW and AX1-2 from bed<>w/c at [facility]   Transfers   Sit to Stand Unable to assess  (PT deferred 2/2 pt reports of lightheadedness)   Balance   Static Sitting Fair   Dynamic Sitting Fair -   Static Standing   (DNT)   Endurance Deficit   Endurance Deficit Yes   Activity Tolerance   Activity Tolerance Patient limited by fatigue   Nurse Made Aware Yes, AYAZ Barnes verbalized pt appropriate for PT session   Assessment   Prognosis Fair   Problem List Decreased strength;Decreased endurance; Impaired balance;Decreased mobility; Decreased coordination;Decreased cognition; Impaired judgement;Decreased safety awareness; Obesity   Assessment Pt seen for PT treatment session this date with interventions consisting of therapeutic activity consisting of training: bed mobility, supine<>sit transfers, static sitting tolerance at EOB for 10 minutes w/ B UE support and vc and tactile cues for static sitting posture faciliation  Pt agreeable to PT treatment session upon arrival, pt found supine in bed w/ HOB elevated, in no apparent distress and responsive  In comparison to previous session, pt with improvements in tolerating increased static unsupported sitting attempt at EOB  Pt continue to be limited to OOB activity 2/2 lightheadedness  Post session: pt returned BTB, bed alarm engaged, all needs in reach and RN notified of session findings/recommendations  Continue to recommend STR at time of d/c in order to maximize pt's functional independence and safety w/ mobility  Pt continues to be functioning below baseline level, and remains limited 2* factors listed abovfe  PT will continue to see pt while here in order to address the deficits listed above and provide interventions consistent w/ POC in effort to achieve STGs  Barriers to Discharge Other (Comment)  (pt from [facility])   Goals   Patient Goals "to return back to [facility]"   LTG Expiration Date 01/28/20   Long Term Goal #1 goals remain appropriate   PT Treatment Day 3   Plan   Treatment/Interventions Functional transfer training;LE strengthening/ROM; Therapeutic exercise; Endurance training;Cognitive reorientation;Patient/family training;Equipment eval/education; Bed mobility;Gait training;Spoke to nursing;OT   Progress Slow progress, decreased activity tolerance   PT Frequency   (3-5x/wk)   Recommendation   Recommendation Short-term skilled PT   Equipment Recommended Walker   PT - OK to Discharge Yes  (when medically cleared, if to STR)       Johny Melchor PT    Time of PT treatment session: [id number]

## 2020-01-20 NOTE — NURSING NOTE
Pt resting comfortably in bed  Knew where she was and the year, not sure about month  Denies pain, sob and dizzyness  No needs at this time  Call bell within reach

## 2020-01-20 NOTE — OCCUPATIONAL THERAPY NOTE
01/20/20 1102   Restrictions/Precautions   Weight Bearing Precautions Per Order No   Other Precautions Cognitive; Bed Alarm; Fall Risk   General   Family/Caregiver Present son and daughter-in-law arrived near end of session    Lifestyle   Intrinsic Gratification patient reports being part of an exercise group about twice a week at the [facility], and occasionally participated in [employer]   Pain Assessment   Pain Assessment No/denies pain   ADL   Where Assessed Other (Comment)  (sitting in bed supported)   Grooming Comments patient combed hair - family was going to assist with denture care post session   UB Bathing Assistance 4  Minimal Assistance   UB Bathing Deficit Setup;Verbal cueing;Supervision/safety; Increased time to complete   UB Bathing Comments patient applied deodorant   LB Bathing Assistance 1  Total Assistance   Bed Mobility   Rolling R 4  Minimal assistance   Additional items Assist x 1;Bedrails; Increased time required   Rolling L 4  Minimal assistance   Additional items Assist x 1;Bedrails; Increased time required   Therapeutic Excerise-Strength   UE Strength Yes   Right Upper Extremity- Strength   R Shoulder Flexion;ABduction;Horizontal ABduction   R Elbow Elbow flexion;Elbow extension   R Weight/Reps/Sets 10 reps 2 sets shoulder exers  and 20 reps with 1 pound weight elbow exer  Left Upper Extremity-Strength   L Weights/Reps/Sets All exers  Same as RUE above   Coordination   Gross Motor WFL   Dexterity WFL   Cognition   Overall Cognitive Status Impaired   Arousal/Participation Alert; Cooperative   Comments patient called her residence [facility] (instead of the [facility])    Activity Tolerance   Activity Tolerance Patient limited by fatigue   Assessment   Assessment Patient participated in Skilled OT session this date with interventions consisting of ADL re training with the use of correct body mechnaics, therapeutic exercise to: increase functional use of BUEs, increase BUE muscle strength  and therapeutic activities to: increase activity tolerance   Patient agreeable to OT treatment session, upon arrival patient was found seated in bed  PCA requested assist with bed mobility to complete bathing process  Patient requiring verbal cues for correct technique and one step directives and occasional rest periods and assistance as noted in flow sheet  Patient continues to be functioning below baseline level, occupational performance remains limited secondary to factors listed above and increased risk for falls and injury  From OT standpoint, recommendation at time of d/c would be Short Term Rehab  Patient to benefit from continued Occupational Therapy treatment while in the hospital to address deficits as defined above and maximize level of functional independence with ADLs and functional mobility  Plan   Treatment Interventions ADL retraining;Functional transfer training;UE strengthening/ROM; Activityengagement   Goal Expiration Date 01/28/20   OT Treatment Day 2   Janann Galeazzi, COTA/WILMAN

## 2020-01-20 NOTE — SOCIAL WORK
PT continues to recommended STR  CM discussed same with nursing director [facility] Garay at [facility] and pt son Arlean Duverney  Son now in agreement with STR  Per [facility] Gar[facility] pt able to return to [place] upon discharge from [facility]  A post acute care recommendation was made by your care team for STR  Discussed Freedom of Choice with POA  List of facilities given to POA via in person  POA aware the list is custom filtered for them by preference  and that [facility] post acute providers are designated  Son chose [facility]  Pt not a candidate for acute rehab  Referrals sent as requested  CM to follow

## 2020-01-20 NOTE — PLAN OF CARE
Problem: Potential for Falls  Goal: Patient will remain free of falls  Description  INTERVENTIONS:  - Assess patient frequently for physical needs  -  Identify cognitive and physical deficits and behaviors that affect risk of falls    -  Moody fall precautions as indicated by assessment   - Educate patient/family on patient safety including physical limitations  - Instruct patient to call for assistance with activity based on assessment  - Modify environment to reduce risk of injury  - Consider OT/PT consult to assist with strengthening/mobility  Outcome: Progressing     Problem: Prexisting or High Potential for Compromised Skin Integrity  Goal: Skin integrity is maintained or improved  Description  INTERVENTIONS:  - Identify patients at risk for skin breakdown  - Assess and monitor skin integrity  - Assess and monitor nutrition and hydration status  - Monitor labs   - Assess for incontinence   - Turn and reposition patient  - Assist with mobility/ambulation  - Relieve pressure over bony prominences  - Avoid friction and shearing  - Provide appropriate hygiene as needed including keeping skin clean and dry  - Evaluate need for skin moisturizer/barrier cream  - Collaborate with interdisciplinary team   - Patient/family teaching  - Consider wound care consult   Outcome: Progressing     Problem: PAIN - ADULT  Goal: Verbalizes/displays adequate comfort level or baseline comfort level  Description  Interventions:  - Encourage patient to monitor pain and request assistance  - Assess pain using appropriate pain scale  - Administer analgesics based on type and severity of pain and evaluate response  - Implement non-pharmacological measures as appropriate and evaluate response  - Consider cultural and social influences on pain and pain management  - Notify physician/advanced practitioner if interventions unsuccessful or patient reports new pain  Outcome: Progressing     Problem: INFECTION - ADULT  Goal: Absence or prevention of progression during hospitalization  Description  INTERVENTIONS:  - Assess and monitor for signs and symptoms of infection  - Monitor lab/diagnostic results  - Monitor all insertion sites, i e  indwelling lines, tubes, and drains  - Hume appropriate cooling/warming therapies per order  - Administer medications as ordered  - Instruct and encourage patient and family to use good hand hygiene technique     Outcome: Progressing     Problem: SAFETY ADULT  Goal: Maintain or return to baseline ADL function  Description  INTERVENTIONS:  -  Assess patient's ability to carry out ADLs; assess patient's baseline for ADL function and identify physical deficits which impact ability to perform ADLs (bathing, care of mouth/teeth, toileting, grooming, dressing, etc )  - Assess/evaluate cause of self-care deficits   - Assess range of motion  - Assess patient's mobility; develop plan if impaired  - Assess patient's need for assistive devices and provide as appropriate  - Encourage maximum independence but intervene and supervise when necessary  - Involve family in performance of ADLs  - Assess for home care needs following discharge   - Consider OT consult to assist with ADL evaluation and planning for discharge  - Provide patient education as appropriate  Outcome: Progressing  Goal: Maintain or return mobility status to optimal level  Description  INTERVENTIONS:  - Assess patient's baseline mobility status (ambulation, transfers, stairs, etc )    - Identify cognitive and physical deficits and behaviors that affect mobility  - Identify mobility aids required to assist with transfers and/or ambulation (gait belt, sit-to-stand, lift, walker, cane, etc )  - Hume fall precautions as indicated by assessment  - Record patient progress and toleration of activity level on Mobility SBAR; progress patient to next Phase/Stage  - Instruct patient to call for assistance with activity based on assessment  - Consider rehabilitation consult to assist with strengthening/weightbearing, etc   Outcome: Progressing     Problem: DISCHARGE PLANNING  Goal: Discharge to home or other facility with appropriate resources  Description  INTERVENTIONS:  - Identify barriers to discharge w/patient and caregiver  - Arrange for needed discharge resources and transportation as appropriate  - Identify discharge learning needs (meds, wound care, etc )  - Refer to Case Management Department for coordinating discharge planning if the patient needs post-hospital services based on physician/advanced practitioner order or complex needs related to functional status, cognitive ability, or social support system   Outcome: Progressing     Problem: Knowledge Deficit  Goal: Patient/family/caregiver demonstrates understanding of disease process, treatment plan, medications, and discharge instructions  Description  Complete learning assessment and assess knowledge base    Interventions:  - Provide teaching at level of understanding  - Provide teaching via preferred learning methods  Outcome: Progressing     Problem: CARDIOVASCULAR - ADULT  Goal: Maintains optimal cardiac output and hemodynamic stability  Description  INTERVENTIONS:  - Monitor I/O, vital signs and rhythm  - Monitor for S/S and trends of decreased cardiac output  - Assess quality of pulses, skin color and temperature  - Assess for signs of decreased coronary artery perfusion  - Instruct patient to report change in severity of symptoms   Outcome: Progressing  Goal: Absence of cardiac dysrhythmias or at baseline rhythm  Description  INTERVENTIONS:  - Continuous cardiac monitoring, vital signs, obtain 12 lead EKG if ordered  - Administer antiarrhythmic and heart rate control medications as ordered  - Monitor electrolytes and administer replacement therapy as ordered  Outcome: Progressing     Problem: GENITOURINARY - ADULT  Goal: Absence of urinary retention  Description  INTERVENTIONS:  - Assess patient's ability to void and empty bladder  - Monitor I/O  - Bladder scan as needed  - Discuss with physician/AP medications to alleviate retention as needed  - Discuss catheterization for long term situations as appropriate   Outcome: Progressing     Problem: METABOLIC, FLUID AND ELECTROLYTES - ADULT  Goal: Electrolytes maintained within normal limits  Description  INTERVENTIONS:  - Monitor labs and assess patient for signs and symptoms of electrolyte imbalances  - Administer electrolyte replacement as ordered  - Monitor response to electrolyte replacements, including repeat lab results as appropriate  - Instruct patient on fluid and nutrition as appropriate  Outcome: Progressing  Goal: Fluid balance maintained  Description  INTERVENTIONS:  - Monitor labs   - Monitor I/O and WT  - Instruct patient on fluid and nutrition as appropriate  - Assess for signs & symptoms of volume excess or deficit  Outcome: Progressing     Problem: SKIN/TISSUE INTEGRITY - ADULT  Goal: Skin integrity remains intact  Description  INTERVENTIONS  - Identify patients at risk for skin breakdown  - Assess and monitor skin integrity  - Assess and monitor nutrition and hydration status  - Monitor labs (i e  albumin)  - Assess for incontinence   - Turn and reposition patient  - Assist with mobility/ambulation  - Relieve pressure over bony prominences  - Avoid friction and shearing  - Provide appropriate hygiene as needed including keeping skin clean and dry  - Evaluate need for skin moisturizer/barrier cream  - Collaborate with interdisciplinary team (i e  Nutrition, Rehabilitation, etc )   - Patient/family teaching  Outcome: Progressing  Goal: Incision(s), wounds(s) or drain site(s) healing without S/S of infection  Description  INTERVENTIONS  - Assess and document risk factors for skin impairment   - Assess and document dressing, incision, wound bed, drain sites and surrounding tissue  - Consider nutrition services referral as needed  - Oral mucous membranes remain intact  - Provide patient/ family education  Outcome: Progressing     Problem: MUSCULOSKELETAL - ADULT  Goal: Maintain or return mobility to safest level of function  Description  INTERVENTIONS:  - Assess patient's ability to carry out ADLs; assess patient's baseline for ADL function and identify physical deficits which impact ability to perform ADLs (bathing, care of mouth/teeth, toileting, grooming, dressing, etc )  - Assess/evaluate cause of self-care deficits   - Assess range of motion  - Assess patient's mobility  - Assess patient's need for assistive devices and provide as appropriate  - Encourage maximum independence but intervene and supervise when necessary  - Involve family in performance of ADLs  - Assess for home care needs following discharge   - Consider OT consult to assist with ADL evaluation and planning for discharge  - Provide patient education as appropriate  Outcome: Progressing

## 2020-01-20 NOTE — PROGRESS NOTES
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 with diminished flow throughout the distal right middle cerebral artery territory branches  Internal carotid arteries without significant stenosis  · +orthostatic hypotension- received some IVF   · Echocardiogram 12/30 shows LVEF 60% with grade 1 diastolic dysfunction   · Fall precautions   · Compression stockings for BLEs edema   · Liberalize salt intake     Elevated troponin  Assessment & Plan  · Likely due to increase demand from syncopal episode   · Troponin- 0 07, 0 07, 0 07  · EKG shows no ischemic changes  · ACS ruled out   · Cardiology input appreciated   · Apply Jakob stockings  · Patient is asymptomatic       Cognitive impairment  Assessment & Plan  · Continue supportive care         Essential hypertension  Assessment & Plan  · Blood pressure is well controlled  · Not on any medication except furosemide which the patient is taking both for lower extremity edema as well  · Continue lasix at 40 mg every other day       VTE Pharmacologic Prophylaxis: Pharmacologic: Enoxaparin (Lovenox)    Patient Centered Rounds: I have performed bedside rounds with nursing staff today      Discussions with Specialists or Other Care Team Provider:  Nursing, Cardiology, PT OT, case management  Education and Discussions with Family / Patient:  Discussed with the patient    Current Length of Stay: 2 day(s)    Current Patient Status: Inpatient   Certification Statement: The patient will continue to require additional inpatient hospital stay due to Monitoring of patient for symptomatic falls due to blood pressure  Discharge Plan:  Patient for an acute rehab stay  Code Status: Level 1 - Full Code    Subjective:   Patient is sitting in bed  She is working with OT and starting to get washed up  Patient is forgetful at times secondary to her cognitive impairment  Patient does deny any dizziness  Objective:     Vitals:   Temp (24hrs), Av 5 °F (36 4 °C), Min:97 4 °F (36 3 °C), Max:97 5 °F (36 4 °C)    Temp:  [97 4 °F (36 3 °C)-97 5 °F (36 4 °C)] 97 4 °F (36 3 °C)  HR:  [66-87] 87  Resp:  [18] 18  BP: (103-126)/(52-56) 111/55  SpO2:  [91 %-93 %] 92 %  Body mass index is 31 56 kg/m²  Input and Output Summary (last 24 hours): Intake/Output Summary (Last 24 hours) at 2020 1546  Last data filed at 2020 1512  Gross per 24 hour   Intake    Output 800 ml   Net -800 ml       Physical Exam:     Physical Exam   Constitutional: Vital signs are normal  She appears well-developed and well-nourished  She is cooperative  HENT:   Head: Normocephalic and atraumatic  Nose: Nose normal    Mouth/Throat: Mucous membranes are normal    Eyes:       Neck: Full passive range of motion without pain  Cardiovascular: Normal rate, regular rhythm, normal heart sounds and normal pulses  Trace nonpitting bilateral lower extremity edema   Pulmonary/Chest: Effort normal and breath sounds normal    Abdominal: Soft  Normal appearance and bowel sounds are normal    Musculoskeletal:   Generalized weakness   Neurological: She is alert  Periods of forgetfulness   Skin: Skin is warm  Psychiatric: She has a normal mood and affect   Her speech is normal and behavior is normal        Additional Data:     Labs:    Results from last 7 days   Lab Units 20  0513   WBC Thousand/uL 9 10   HEMOGLOBIN g/dL 14 5   HEMATOCRIT % 43 9   PLATELETS Thousands/uL 225   NEUTROS PCT % 70   LYMPHS PCT % 19*   MONOS PCT % 8   EOS PCT % 3 Results from last 7 days   Lab Units 01/18/20  0513   POTASSIUM mmol/L 3 6   CHLORIDE mmol/L 106   CO2 mmol/L 30   BUN mg/dL 12   CREATININE mg/dL 0 64   CALCIUM mg/dL 9 0   ALK PHOS U/L 100   ALT U/L 8   AST U/L 11*                   * I Have Reviewed All Lab Data Listed Above  * Additional Pertinent Lab Tests Reviewed: Travis 66 Admission  Reviewed    Imaging:  Imaging Reports Reviewed Today Include:  None    Recent Cultures (last 7 days):           Last 24 Hours Medication List:     Current Facility-Administered Medications:  acetaminophen 650 mg Oral Q6H PRN Mary Reyes, AMANDONP   aspirin 81 mg Oral Daily Davidalee Eric, CRNP   docusate sodium 100 mg Oral BID Mary Reyes, AMANDONP   enoxaparin 40 mg Subcutaneous Daily Mary Reyes, AMANDONP   furosemide 40 mg Oral Q48H Mary Reyes, VALDEZ   latanoprost 1 drop Right Eye HS Mary Reyes, VALDEZ   multivitamin-minerals 1 tablet Oral Daily Mary Reyes, VALDEZ   pravastatin 40 mg Oral Daily With [employer], VALDEZ   SOOTHE NIGHTTIME 1 application Right Eye Daily Makaylae Greenadam, VALDEZ   timolol 1 drop Right Eye Daily Mary Greenadam, VALDEZ   triamcinolone  Topical BID PRN Mary Reyes, VALDEZ        Today, Patient Was Seen By: VALDEZ Blake    ** Please Note: Dictation voice to text software may have been used in the creation of this document   **

## 2020-01-20 NOTE — PLAN OF CARE
Problem: DISCHARGE PLANNING  Goal: Discharge to home or other facility with appropriate resources  Description  INTERVENTIONS:  - Identify barriers to discharge w/patient and caregiver  - Arrange for needed discharge resources and transportation as appropriate  - Identify discharge learning needs (meds, wound care, etc )  - Refer to Case Management Department for coordinating discharge planning if the patient needs post-hospital services based on physician/advanced practitioner order or complex needs related to functional status, cognitive ability, or social support system   Outcome: Progressing

## 2020-01-21 ENCOUNTER — HOSPITAL ENCOUNTER (INPATIENT)
Facility: HOSPITAL | Age: 85
LOS: 23 days | Discharge: NURSING, REHAB OR LONG-TERM CARE | DRG: 312 | End: 2020-02-13
Attending: INTERNAL MEDICINE | Admitting: INTERNAL MEDICINE
Payer: MEDICARE

## 2020-01-21 VITALS
SYSTOLIC BLOOD PRESSURE: 120 MMHG | TEMPERATURE: 97.5 F | DIASTOLIC BLOOD PRESSURE: 58 MMHG | WEIGHT: 183.86 LBS | HEART RATE: 62 BPM | OXYGEN SATURATION: 91 % | RESPIRATION RATE: 18 BRPM | HEIGHT: 64 IN | BODY MASS INDEX: 31.39 KG/M2

## 2020-01-21 DIAGNOSIS — I10 ESSENTIAL HYPERTENSION: ICD-10-CM

## 2020-01-21 DIAGNOSIS — I87.2 EDEMA OF BOTH LOWER EXTREMITIES DUE TO PERIPHERAL VENOUS INSUFFICIENCY: Chronic | ICD-10-CM

## 2020-01-21 DIAGNOSIS — E78.49 OTHER HYPERLIPIDEMIA: Primary | Chronic | ICD-10-CM

## 2020-01-21 DIAGNOSIS — I73.9 PVD (PERIPHERAL VASCULAR DISEASE) (HCC): Chronic | ICD-10-CM

## 2020-01-21 DIAGNOSIS — R55 VASOVAGAL SYNCOPE: ICD-10-CM

## 2020-01-21 DIAGNOSIS — Z11.59 SPECIAL SCREENING EXAMINATION FOR UNSPECIFIED VIRAL DISEASE: ICD-10-CM

## 2020-01-21 DIAGNOSIS — R29.90 STROKE-LIKE SYMPTOM: ICD-10-CM

## 2020-01-21 DIAGNOSIS — R05.9 COUGH: Chronic | ICD-10-CM

## 2020-01-21 DIAGNOSIS — K59.09 OTHER CONSTIPATION: ICD-10-CM

## 2020-01-21 DIAGNOSIS — N30.00 ACUTE CYSTITIS WITHOUT HEMATURIA: ICD-10-CM

## 2020-01-21 DIAGNOSIS — R77.8 ELEVATED TROPONIN: ICD-10-CM

## 2020-01-21 DIAGNOSIS — R41.89 COGNITIVE IMPAIRMENT: ICD-10-CM

## 2020-01-21 PROCEDURE — 99239 HOSP IP/OBS DSCHRG MGMT >30: CPT | Performed by: NURSE PRACTITIONER

## 2020-01-21 RX ORDER — DOCUSATE SODIUM 100 MG/1
100 CAPSULE, LIQUID FILLED ORAL 2 TIMES DAILY
Qty: 10 CAPSULE | Refills: 0 | Status: ON HOLD | OUTPATIENT
Start: 2020-01-21

## 2020-01-21 RX ORDER — FUROSEMIDE 40 MG/1
40 TABLET ORAL
Qty: 30 TABLET | Refills: 0 | Status: ON HOLD | OUTPATIENT
Start: 2020-01-21

## 2020-01-21 RX ADMIN — Medication 1 TABLET: at 09:03

## 2020-01-21 RX ADMIN — TIMOLOL MALEATE 1 DROP: 5 SOLUTION OPHTHALMIC at 09:03

## 2020-01-21 RX ADMIN — ENOXAPARIN SODIUM 40 MG: 40 INJECTION SUBCUTANEOUS at 09:02

## 2020-01-21 RX ADMIN — DOCUSATE SODIUM 100 MG: 100 CAPSULE, LIQUID FILLED ORAL at 09:03

## 2020-01-21 RX ADMIN — ASPIRIN 81 MG 81 MG: 81 TABLET ORAL at 09:03

## 2020-01-21 RX ADMIN — Medication 1 APPLICATION: at 09:04

## 2020-01-21 NOTE — SOCIAL WORK
Pt medically stable for discharge today per [facility]  Pt has been accepted to [facility]  for STR per [place] Bustos  Pt son Chiara Mack aware and in agreement  He is meeting with Jeffrey Braun at 1300 to sign paperwork  The [address] staff made aware pt will be going to [place] for STR before returning  Pt nurse aware

## 2020-01-21 NOTE — DISCHARGE SUMMARY
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 with diminished flow throughout the distal right middle cerebral artery territory branches  Internal carotid arteries without significant stenosis  · +orthostatic hypotension- received some IVF   · Echocardiogram 12/30 shows LVEF 60% with grade 1 diastolic dysfunction   · Fall precautions   · Compression stockings for BLEs edema   · Liberalize salt intake     Elevated troponin  Assessment & Plan  · Likely due to increase demand from syncopal episode   · Troponin- 0 07, 0 07, 0 07  · EKG shows no ischemic changes  · ACS ruled out   · Cardiology input appreciated   · Apply Jakob stockings  · Patient is asymptomatic       Cognitive impairment  Assessment & Plan  · Continue supportive care   · Patient is hard of hearing        Essential hypertension  Assessment & Plan  · Blood pressure is well controlled  · Not on any medication except furosemide which the patient is taking both for lower extremity edema as well  · Continue lasix at 40 mg every other day       Discharging Physician / Practitioner: VALDEZ Richards  PCP: No primary care provider on file    Admission Date:   Admission Orders (From admission, onward)     Ordered        01/18/20 1414  Inpatient Admission  Once         01/17/20 1158  Place in Observation  Once                   Discharge Date: 01/21/20    Resolved Problems  Date Reviewed: 1/21/2020    None          Consultations During Hospital Stay:  · Cardiology  · PT/OT    Procedures Performed: · CT head: no acute intracranial abnormality  Microangiopathic changes  · CXR: trace left pleural effusion    Significant Findings / Test Results:   · troponins x 3 at 0 07    Incidental Findings:   · none    Test Results Pending at Discharge (will require follow up):   · none     Outpatient Tests Requested:  · none    Complications:     none    Reason for Admission: syncope    Hospital Course:     Steven Anderson is a 80 y o  female patient who originally presented to the hospital on 1/17/2020 due to syncope  The patient [facility] a [facility]  She has been feeling in her normal state of health until this a m  When she was in the bathroom having diarrhea  She states that when she tried to get up she was feeling lightheadedness and dizziness  She does not remember whether she has lost consciousness  Per ED staff, the patient was noted to have shaking all over her body with "passing out" while on the toilet  Unclear whether the patient did have a true syncope  Subsequently, the patient was brought into the ED for further evaluation  Currently she denies any lightheadedness, dizziness, chest pain or palpitation  She states feeling much improved currently  She is eating lunch with good appetite  Patient had no further episodes of dizziness or lightheadedness nor any syncopal episodes  She was determined to be appropriate for rehabilitation at the [facility] and will be discharged today  Please see above list of diagnoses and related plan for additional information  Condition at Discharge: fair     Discharge Day Visit / Exam:     Subjective:  Patient is sitting in bed  She offers no complaints  She is eating all of her meal independently, but her son did cut everything up for her       Vitals: Blood Pressure: 120/58 (01/21/20 0630)  Pulse: 62 (01/21/20 0630)  Temperature: 97 5 °F (36 4 °C) (01/21/20 0630)  Temp Source: Temporal (01/21/20 0630)  Respirations: 18 (01/21/20 0630)  Height: 5' 4" (162 6 cm) (01/17/20 1819)  Weight - Scale: 83 4 kg (183 lb 13 8 oz) (01/17/20 1819)  SpO2: 91 % (01/21/20 0630)  Exam:   Physical Exam   Constitutional: Vital signs are normal  She appears well-developed and well-nourished  She is cooperative  HENT:   Head: Normocephalic and atraumatic  Right Ear: Decreased hearing is noted  Left Ear: Decreased hearing is noted  Nose: Nose normal    Mouth/Throat: Oropharynx is clear and moist and mucous membranes are normal    Eyes: Conjunctivae and EOM are normal    Neck: Full passive range of motion without pain  Cardiovascular: Normal rate, regular rhythm, normal heart sounds and normal pulses  Trace non pitting bilateral lower extremity edema  Pulmonary/Chest: Effort normal and breath sounds normal    Abdominal: Soft  Normal appearance and bowel sounds are normal    Musculoskeletal:   Generalized weakness, patient uses a roller walker  Neurological: She is alert  Pleasantly forgetful   Skin: Skin is warm  Psychiatric: She has a normal mood and affect  Her speech is normal and behavior is normal        Discussion with Family: discussed with the patient and her son and daughter in law at the bedside  Discharge instructions/Information to patient and family:   See after visit summary for information provided to patient and family  Provisions for Follow-Up Care:  See after visit summary for information related to follow-up care and any pertinent home health orders  Dis[facility]:     [facility]    For Discharges to [facility] SNF:   · Not Applicable to this Patient - Not Applicable to this Patient    Planned Readmission:    no     Discharge Statement:  I spent 45 minutes discharging the patient  This time was spent on the day of discharge  I had direct contact with the patient on the day of discharge   Greater than 50% of the total time was spent examining patient, answering all patient questions, arranging and discussing plan of care with patient as well as directly providing post-discharge instructions  Additional time then spent on discharge activities  Discharge Medications:  See after visit summary for reconciled discharge medications provided to patient and family        ** Please Note: This note has been constructed using a voice recognition system **

## 2020-01-30 PROCEDURE — 0HBRXZZ EXCISION OF TOE NAIL, EXTERNAL APPROACH: ICD-10-PCS | Performed by: PODIATRIST

## 2020-02-04 LAB
ANION GAP SERPL CALCULATED.3IONS-SCNC: 5 MMOL/L (ref 4–13)
AST SERPL W P-5'-P-CCNC: 12 U/L (ref 13–39)
BASOPHILS # BLD AUTO: 0.1 THOUSANDS/ΜL (ref 0–0.1)
BASOPHILS NFR BLD AUTO: 1 % (ref 0–2)
BUN SERPL-MCNC: 17 MG/DL (ref 7–25)
CALCIUM SERPL-MCNC: 9.3 MG/DL (ref 8.6–10.5)
CHLORIDE SERPL-SCNC: 104 MMOL/L (ref 98–107)
CO2 SERPL-SCNC: 33 MMOL/L (ref 21–31)
CREAT SERPL-MCNC: 0.72 MG/DL (ref 0.6–1.2)
EOSINOPHIL # BLD AUTO: 0.3 THOUSAND/ΜL (ref 0–0.61)
EOSINOPHIL NFR BLD AUTO: 4 % (ref 0–5)
ERYTHROCYTE [DISTWIDTH] IN BLOOD BY AUTOMATED COUNT: 13.3 % (ref 11.5–14.5)
GFR SERPL CREATININE-BSD FRML MDRD: 71 ML/MIN/1.73SQ M
GLUCOSE SERPL-MCNC: 87 MG/DL (ref 65–99)
HCT VFR BLD AUTO: 46.9 % (ref 42–47)
HGB BLD-MCNC: 15.5 G/DL (ref 12–16)
LDLC SERPL DIRECT ASSAY-MCNC: 89.5 MG/DL (ref 0–100)
LYMPHOCYTES # BLD AUTO: 1.4 THOUSANDS/ΜL (ref 0.6–4.47)
LYMPHOCYTES NFR BLD AUTO: 18 % (ref 21–51)
MCH RBC QN AUTO: 30.5 PG (ref 26–34)
MCHC RBC AUTO-ENTMCNC: 33.1 G/DL (ref 31–37)
MCV RBC AUTO: 92 FL (ref 81–99)
MONOCYTES # BLD AUTO: 0.7 THOUSAND/ΜL (ref 0.17–1.22)
MONOCYTES NFR BLD AUTO: 9 % (ref 2–12)
NEUTROPHILS # BLD AUTO: 5.2 THOUSANDS/ΜL (ref 1.4–6.5)
NEUTS SEG NFR BLD AUTO: 68 % (ref 42–75)
PLATELET # BLD AUTO: 228 THOUSANDS/UL (ref 149–390)
PMV BLD AUTO: 9.3 FL (ref 8.6–11.7)
POTASSIUM SERPL-SCNC: 3.8 MMOL/L (ref 3.5–5.5)
RBC # BLD AUTO: 5.1 MILLION/UL (ref 3.9–5.2)
SODIUM SERPL-SCNC: 142 MMOL/L (ref 134–143)
WBC # BLD AUTO: 7.7 THOUSAND/UL (ref 4.8–10.8)

## 2020-02-04 PROCEDURE — 84450 TRANSFERASE (AST) (SGOT): CPT | Performed by: INTERNAL MEDICINE

## 2020-02-04 PROCEDURE — 83721 ASSAY OF BLOOD LIPOPROTEIN: CPT | Performed by: INTERNAL MEDICINE

## 2020-02-04 PROCEDURE — 85025 COMPLETE CBC W/AUTO DIFF WBC: CPT | Performed by: INTERNAL MEDICINE

## 2020-02-04 PROCEDURE — 80048 BASIC METABOLIC PNL TOTAL CA: CPT | Performed by: INTERNAL MEDICINE

## 2020-02-13 ENCOUNTER — HOSPITAL ENCOUNTER (INPATIENT)
Facility: HOSPITAL | Age: 85
LOS: 1 YEAR days | Discharge: NURSING, REHAB OR LONG-TERM CARE | DRG: 312 | End: 2021-06-30
Attending: INTERNAL MEDICINE | Admitting: INTERNAL MEDICINE
Payer: MEDICARE

## 2020-02-13 DIAGNOSIS — R29.90 STROKE-LIKE SYMPTOM: ICD-10-CM

## 2020-02-13 DIAGNOSIS — E78.49 OTHER HYPERLIPIDEMIA: Primary | Chronic | ICD-10-CM

## 2020-02-13 DIAGNOSIS — I87.2 EDEMA OF BOTH LOWER EXTREMITIES DUE TO PERIPHERAL VENOUS INSUFFICIENCY: Chronic | ICD-10-CM

## 2020-02-13 DIAGNOSIS — R77.8 ELEVATED TROPONIN: ICD-10-CM

## 2020-02-13 DIAGNOSIS — R55 VASOVAGAL SYNCOPE: ICD-10-CM

## 2020-02-13 DIAGNOSIS — R41.89 COGNITIVE IMPAIRMENT: ICD-10-CM

## 2020-02-13 DIAGNOSIS — R05.9 COUGH: Chronic | ICD-10-CM

## 2020-02-13 DIAGNOSIS — I73.9 PVD (PERIPHERAL VASCULAR DISEASE) (HCC): Chronic | ICD-10-CM

## 2020-02-13 DIAGNOSIS — Z20.822 COVID-19 RULED OUT: ICD-10-CM

## 2020-02-13 DIAGNOSIS — K59.09 OTHER CONSTIPATION: ICD-10-CM

## 2020-02-13 DIAGNOSIS — I10 ESSENTIAL HYPERTENSION: ICD-10-CM

## 2020-02-13 DIAGNOSIS — N30.00 ACUTE CYSTITIS WITHOUT HEMATURIA: ICD-10-CM

## 2020-02-13 DIAGNOSIS — Z11.59 SPECIAL SCREENING EXAMINATION FOR UNSPECIFIED VIRAL DISEASE: ICD-10-CM

## 2020-04-09 PROCEDURE — 0HBRXZZ EXCISION OF TOE NAIL, EXTERNAL APPROACH: ICD-10-PCS | Performed by: PODIATRIST

## 2020-05-25 LAB — SARS-COV-2 RNA RESP QL NAA+PROBE: NEGATIVE

## 2020-05-25 PROCEDURE — 87635 SARS-COV-2 COVID-19 AMP PRB: CPT | Performed by: INTERNAL MEDICINE

## 2020-05-29 NOTE — PLAN OF CARE
Problem: Potential for Falls  Goal: Patient will remain free of falls  Description  INTERVENTIONS:  - Assess patient frequently for physical needs  -  Identify cognitive and physical deficits and behaviors that affect risk of falls    -  [place] fall precautions as indicated by assessment   - Educate patient/family on patient safety including physical limitations  - Instruct patient to call for assistance with activity based on assessment  - Modify environment to reduce risk of injury  - Consider OT/PT consult to assist with strengthening/mobility  Outcome: Progressing     Problem: Prexisting or High Potential for Compromised Skin Integrity  Goal: Skin integrity is maintained or improved  Description  INTERVENTIONS:  - Identify patients at risk for skin breakdown  - Assess and monitor skin integrity  - Assess and monitor nutrition and hydration status  - Monitor labs   - Assess for incontinence   - Turn and reposition patient  - Assist with mobility/ambulation  - Relieve pressure over bony prominences  - Avoid friction and shearing  - Provide appropriate hygiene as needed including keeping skin clean and dry  - Evaluate need for skin moisturizer/barrier cream  - Collaborate with interdisciplinary team   - Patient/family teaching  - Consider wound care consult   Outcome: Progressing     Problem: PAIN - ADULT  Goal: Verbalizes/displays adequate comfort level or baseline comfort level  Description  Interventions:  - Encourage patient to monitor pain and request assistance  - Assess pain using appropriate pain scale  - Administer analgesics based on type and severity of pain and evaluate response  - Implement non-pharmacological measures as appropriate and evaluate response  - Consider cultural and social influences on pain and pain management  - Notify physician/advanced practitioner if interventions unsuccessful or patient reports new pain  Outcome: Progressing     Problem: INFECTION - ADULT  Goal: Absence or Sent to Disability Dept   prevention of progression during hospitalization  Description  INTERVENTIONS:  - Assess and monitor for signs and symptoms of infection  - Monitor lab/diagnostic results  - Monitor all insertion sites, i e  indwelling lines, tubes, and drains  - [place] appropriate cooling/warming therapies per order  - Administer medications as ordered  - Instruct and encourage patient and family to use good hand hygiene technique     Outcome: Progressing     Problem: SAFETY ADULT  Goal: Maintain or return to baseline ADL function  Description  INTERVENTIONS:  -  Assess patient's ability to carry out ADLs; assess patient's baseline for ADL function and identify physical deficits which impact ability to perform ADLs (bathing, care of mouth/teeth, toileting, grooming, dressing, etc )  - Assess/evaluate cause of self-care deficits   - Assess range of motion  - Assess patient's mobility; develop plan if impaired  - Assess patient's need for assistive devices and provide as appropriate  - Encourage maximum independence but intervene and supervise when necessary  - Involve family in performance of ADLs  - Assess for home care needs following discharge   - Consider OT consult to assist with ADL evaluation and planning for discharge  - Provide patient education as appropriate  Outcome: Progressing  Goal: Maintain or return mobility status to optimal level  Description  INTERVENTIONS:  - Assess patient's baseline mobility status (ambulation, transfers, stairs, etc )    - Identify cognitive and physical deficits and behaviors that affect mobility  - Identify mobility aids required to assist with transfers and/or ambulation (gait belt, sit-to-stand, lift, walker, cane, etc )  - [place] fall precautions as indicated by assessment  - Record patient progress and toleration of activity level on Mobility SBAR; progress patient to next Phase/Stage  - Instruct patient to call for assistance with activity based on assessment  - Consider rehabilitation consult to assist with strengthening/weightbearing, etc   Outcome: Progressing     Problem: DISCHARGE PLANNING  Goal: Discharge to home or other facility with appropriate resources  Description  INTERVENTIONS:  - Identify barriers to discharge w/patient and caregiver  - Arrange for needed discharge resources and transportation as appropriate  - Identify discharge learning needs (meds, wound care, etc )  - Refer to Case Management Department for coordinating discharge planning if the patient needs post-hospital services based on physician/advanced practitioner order or complex needs related to functional status, cognitive ability, or social support system   Outcome: Progressing     Problem: Knowledge Deficit  Goal: Patient/family/caregiver demonstrates understanding of disease process, treatment plan, medications, and discharge instructions  Description  Complete learning assessment and assess knowledge base    Interventions:  - Provide teaching at level of understanding  - Provide teaching via preferred learning methods  Outcome: Progressing     Problem: CARDIOVASCULAR - ADULT  Goal: Maintains optimal cardiac output and hemodynamic stability  Description  INTERVENTIONS:  - Monitor I/O, vital signs and rhythm  - Monitor for S/S and trends of decreased cardiac output  - Assess quality of pulses, skin color and temperature  - Assess for signs of decreased coronary artery perfusion  - Instruct patient to report change in severity of symptoms   Outcome: Progressing  Goal: Absence of cardiac dysrhythmias or at baseline rhythm  Description  INTERVENTIONS:  - Continuous cardiac monitoring, vital signs, obtain 12 lead EKG if ordered  - Administer antiarrhythmic and heart rate control medications as ordered  - Monitor electrolytes and administer replacement therapy as ordered  Outcome: Progressing     Problem: GENITOURINARY - ADULT  Goal: Absence of urinary retention  Description  INTERVENTIONS:  - Assess patient's ability to void and empty bladder  - Monitor I/O  - Bladder scan as needed  - Discuss with physician/AP medications to alleviate retention as needed  - Discuss catheterization for long term situations as appropriate   Outcome: Progressing     Problem: METABOLIC, FLUID AND ELECTROLYTES - ADULT  Goal: Electrolytes maintained within normal limits  Description  INTERVENTIONS:  - Monitor labs and assess patient for signs and symptoms of electrolyte imbalances  - Administer electrolyte replacement as ordered  - Monitor response to electrolyte replacements, including repeat lab results as appropriate  - Instruct patient on fluid and nutrition as appropriate  Outcome: Progressing  Goal: Fluid balance maintained  Description  INTERVENTIONS:  - Monitor labs   - Monitor I/O and WT  - Instruct patient on fluid and nutrition as appropriate  - Assess for signs & symptoms of volume excess or deficit  Outcome: Progressing     Problem: SKIN/TISSUE INTEGRITY - ADULT  Goal: Skin integrity remains intact  Description  INTERVENTIONS  - Identify patients at risk for skin breakdown  - Assess and monitor skin integrity  - Assess and monitor nutrition and hydration status  - Monitor labs (i e  albumin)  - Assess for incontinence   - Turn and reposition patient  - Assist with mobility/ambulation  - Relieve pressure over bony prominences  - Avoid friction and shearing  - Provide appropriate hygiene as needed including keeping skin clean and dry  - Evaluate need for skin moisturizer/barrier cream  - Collaborate with interdisciplinary team (i e  Nutrition, Rehabilitation, etc )   - Patient/family teaching  Outcome: Progressing  Goal: Incision(s), wounds(s) or drain site(s) healing without S/S of infection  Description  INTERVENTIONS  - Assess and document risk factors for skin impairment   - Assess and document dressing, incision, wound bed, drain sites and surrounding tissue  - Consider nutrition services referral as needed  - Oral mucous membranes remain intact  - Provide patient/ family education  Outcome: Progressing     Problem: MUSCULOSKELETAL - ADULT  Goal: Maintain or return mobility to safest level of function  Description  INTERVENTIONS:  - Assess patient's ability to carry out ADLs; assess patient's baseline for ADL function and identify physical deficits which impact ability to perform ADLs (bathing, care of mouth/teeth, toileting, grooming, dressing, etc )  - Assess/evaluate cause of self-care deficits   - Assess range of motion  - Assess patient's mobility  - Assess patient's need for assistive devices and provide as appropriate  - Encourage maximum independence but intervene and supervise when necessary  - Involve family in performance of ADLs  - Assess for home care needs following discharge   - Consider OT consult to assist with ADL evaluation and planning for discharge  - Provide patient education as appropriate  Outcome: Progressing

## 2020-06-18 PROCEDURE — 0HBRXZZ EXCISION OF TOE NAIL, EXTERNAL APPROACH: ICD-10-PCS | Performed by: PODIATRIST

## 2020-06-22 LAB — SARS-COV-2 N GENE RESP QL NAA+PROBE: NEGATIVE

## 2020-06-22 PROCEDURE — U0003 INFECTIOUS AGENT DETECTION BY NUCLEIC ACID (DNA OR RNA); SEVERE ACUTE RESPIRATORY SYNDROME CORONAVIRUS 2 (SARS-COV-2) (CORONAVIRUS DISEASE [COVID-19]), AMPLIFIED PROBE TECHNIQUE, MAKING USE OF HIGH THROUGHPUT TECHNOLOGIES AS DESCRIBED BY CMS-2020-01-R: HCPCS | Performed by: INTERNAL MEDICINE

## 2020-08-04 LAB
ANION GAP SERPL CALCULATED.3IONS-SCNC: 5 MMOL/L (ref 4–13)
AST SERPL W P-5'-P-CCNC: 10 U/L (ref 13–39)
BASOPHILS # BLD AUTO: 0.1 THOUSANDS/ΜL (ref 0–0.1)
BASOPHILS NFR BLD AUTO: 1 % (ref 0–2)
BUN SERPL-MCNC: 14 MG/DL (ref 7–25)
CALCIUM SERPL-MCNC: 9.3 MG/DL (ref 8.6–10.5)
CHLORIDE SERPL-SCNC: 102 MMOL/L (ref 98–107)
CO2 SERPL-SCNC: 33 MMOL/L (ref 21–31)
CREAT SERPL-MCNC: 0.58 MG/DL (ref 0.6–1.2)
EOSINOPHIL # BLD AUTO: 0.3 THOUSAND/ΜL (ref 0–0.61)
EOSINOPHIL NFR BLD AUTO: 3 % (ref 0–5)
ERYTHROCYTE [DISTWIDTH] IN BLOOD BY AUTOMATED COUNT: 14.8 % (ref 11.5–14.5)
GFR SERPL CREATININE-BSD FRML MDRD: 78 ML/MIN/1.73SQ M
GLUCOSE SERPL-MCNC: 91 MG/DL (ref 65–99)
HCT VFR BLD AUTO: 41.6 % (ref 42–47)
HGB BLD-MCNC: 14 G/DL (ref 12–16)
LDLC SERPL DIRECT ASSAY-MCNC: 80.3 MG/DL (ref 0–100)
LYMPHOCYTES # BLD AUTO: 1.7 THOUSANDS/ΜL (ref 0.6–4.47)
LYMPHOCYTES NFR BLD AUTO: 21 % (ref 21–51)
MCH RBC QN AUTO: 30.5 PG (ref 26–34)
MCHC RBC AUTO-ENTMCNC: 33.7 G/DL (ref 31–37)
MCV RBC AUTO: 91 FL (ref 81–99)
MONOCYTES # BLD AUTO: 0.6 THOUSAND/ΜL (ref 0.17–1.22)
MONOCYTES NFR BLD AUTO: 7 % (ref 2–12)
NEUTROPHILS # BLD AUTO: 5.7 THOUSANDS/ΜL (ref 1.4–6.5)
NEUTS SEG NFR BLD AUTO: 68 % (ref 42–75)
PLATELET # BLD AUTO: 216 THOUSANDS/UL (ref 149–390)
PMV BLD AUTO: 8.7 FL (ref 8.6–11.7)
POTASSIUM SERPL-SCNC: 3.6 MMOL/L (ref 3.5–5.5)
RBC # BLD AUTO: 4.59 MILLION/UL (ref 3.9–5.2)
SODIUM SERPL-SCNC: 140 MMOL/L (ref 134–143)
WBC # BLD AUTO: 8.3 THOUSAND/UL (ref 4.8–10.8)

## 2020-08-04 PROCEDURE — 80048 BASIC METABOLIC PNL TOTAL CA: CPT | Performed by: INTERNAL MEDICINE

## 2020-08-04 PROCEDURE — 83721 ASSAY OF BLOOD LIPOPROTEIN: CPT | Performed by: INTERNAL MEDICINE

## 2020-08-04 PROCEDURE — 84450 TRANSFERASE (AST) (SGOT): CPT | Performed by: INTERNAL MEDICINE

## 2020-08-04 PROCEDURE — 85025 COMPLETE CBC W/AUTO DIFF WBC: CPT | Performed by: INTERNAL MEDICINE

## 2020-09-10 PROCEDURE — 0HBRXZZ EXCISION OF TOE NAIL, EXTERNAL APPROACH: ICD-10-PCS | Performed by: PODIATRIST

## 2020-10-26 PROCEDURE — 87635 SARS-COV-2 COVID-19 AMP PRB: CPT | Performed by: NURSE PRACTITIONER

## 2020-10-27 LAB — SARS-COV-2 RNA RESP QL NAA+PROBE: NEGATIVE

## 2020-10-30 LAB — SARS-COV-2 RNA RESP QL NAA+PROBE: NEGATIVE

## 2020-10-30 PROCEDURE — 87635 SARS-COV-2 COVID-19 AMP PRB: CPT | Performed by: NURSE PRACTITIONER

## 2020-11-04 LAB — SARS-COV-2 RNA RESP QL NAA+PROBE: NEGATIVE

## 2020-11-04 PROCEDURE — 87635 SARS-COV-2 COVID-19 AMP PRB: CPT | Performed by: NURSE PRACTITIONER

## 2020-11-10 LAB — SARS-COV-2 RNA RESP QL NAA+PROBE: NEGATIVE

## 2020-11-10 PROCEDURE — 87635 SARS-COV-2 COVID-19 AMP PRB: CPT | Performed by: NURSE PRACTITIONER

## 2020-11-17 LAB — SARS-COV-2 RNA RESP QL NAA+PROBE: POSITIVE

## 2020-11-17 PROCEDURE — U0003 INFECTIOUS AGENT DETECTION BY NUCLEIC ACID (DNA OR RNA); SEVERE ACUTE RESPIRATORY SYNDROME CORONAVIRUS 2 (SARS-COV-2) (CORONAVIRUS DISEASE [COVID-19]), AMPLIFIED PROBE TECHNIQUE, MAKING USE OF HIGH THROUGHPUT TECHNOLOGIES AS DESCRIBED BY CMS-2020-01-R: HCPCS | Performed by: INTERNAL MEDICINE

## 2021-01-14 PROCEDURE — 0HBRXZZ EXCISION OF TOE NAIL, EXTERNAL APPROACH: ICD-10-PCS | Performed by: PODIATRIST

## 2021-02-04 ENCOUNTER — IMMUNIZATIONS (OUTPATIENT)
Dept: FAMILY MEDICINE CLINIC | Facility: HOSPITAL | Age: 86
End: 2021-02-04

## 2021-02-04 DIAGNOSIS — Z23 ENCOUNTER FOR IMMUNIZATION: Primary | ICD-10-CM

## 2021-02-04 PROCEDURE — XW023S6 INTRODUCTION OF COVID-19 VACCINE DOSE 1 INTO MUSCLE, PERCUTANEOUS APPROACH, NEW TECHNOLOGY GROUP 6: ICD-10-PCS | Performed by: INTERNAL MEDICINE

## 2021-02-04 PROCEDURE — 0011A SARS-COV-2 / COVID-19 MRNA VACCINE (MODERNA) 100 MCG: CPT

## 2021-02-04 PROCEDURE — 91301 SARS-COV-2 / COVID-19 MRNA VACCINE (MODERNA) 100 MCG: CPT

## 2021-02-09 LAB
ANION GAP SERPL CALCULATED.3IONS-SCNC: 10 MMOL/L (ref 4–13)
AST SERPL W P-5'-P-CCNC: 7 U/L (ref 13–39)
BASOPHILS # BLD AUTO: 0.1 THOUSANDS/ΜL (ref 0–0.1)
BASOPHILS NFR BLD AUTO: 1 % (ref 0–2)
BUN SERPL-MCNC: 21 MG/DL (ref 7–25)
CALCIUM SERPL-MCNC: 9.1 MG/DL (ref 8.6–10.5)
CHLORIDE SERPL-SCNC: 105 MMOL/L (ref 98–107)
CO2 SERPL-SCNC: 29 MMOL/L (ref 21–31)
CREAT SERPL-MCNC: 0.49 MG/DL (ref 0.6–1.2)
EOSINOPHIL # BLD AUTO: 0.2 THOUSAND/ΜL (ref 0–0.61)
EOSINOPHIL NFR BLD AUTO: 3 % (ref 0–5)
ERYTHROCYTE [DISTWIDTH] IN BLOOD BY AUTOMATED COUNT: 15 % (ref 11.5–14.5)
GFR SERPL CREATININE-BSD FRML MDRD: 82 ML/MIN/1.73SQ M
GLUCOSE P FAST SERPL-MCNC: 81 MG/DL (ref 65–99)
GLUCOSE SERPL-MCNC: 81 MG/DL (ref 65–99)
HCT VFR BLD AUTO: 42.2 % (ref 42–47)
HGB BLD-MCNC: 13.7 G/DL (ref 12–16)
LDLC SERPL DIRECT ASSAY-MCNC: 97.6 MG/DL (ref 0–100)
LYMPHOCYTES # BLD AUTO: 1.9 THOUSANDS/ΜL (ref 0.6–4.47)
LYMPHOCYTES NFR BLD AUTO: 26 % (ref 21–51)
MCH RBC QN AUTO: 30.3 PG (ref 26–34)
MCHC RBC AUTO-ENTMCNC: 32.5 G/DL (ref 31–37)
MCV RBC AUTO: 93 FL (ref 81–99)
MONOCYTES # BLD AUTO: 0.7 THOUSAND/ΜL (ref 0.17–1.22)
MONOCYTES NFR BLD AUTO: 10 % (ref 2–12)
NEUTROPHILS # BLD AUTO: 4.3 THOUSANDS/ΜL (ref 1.4–6.5)
NEUTS SEG NFR BLD AUTO: 60 % (ref 42–75)
PLATELET # BLD AUTO: 222 THOUSANDS/UL (ref 149–390)
PMV BLD AUTO: 8.4 FL (ref 8.6–11.7)
POTASSIUM SERPL-SCNC: 3.9 MMOL/L (ref 3.5–5.5)
RBC # BLD AUTO: 4.54 MILLION/UL (ref 3.9–5.2)
SODIUM SERPL-SCNC: 144 MMOL/L (ref 134–143)
WBC # BLD AUTO: 7.2 THOUSAND/UL (ref 4.8–10.8)

## 2021-02-09 PROCEDURE — 80048 BASIC METABOLIC PNL TOTAL CA: CPT | Performed by: NURSE PRACTITIONER

## 2021-02-09 PROCEDURE — 84450 TRANSFERASE (AST) (SGOT): CPT | Performed by: NURSE PRACTITIONER

## 2021-02-09 PROCEDURE — 85025 COMPLETE CBC W/AUTO DIFF WBC: CPT | Performed by: NURSE PRACTITIONER

## 2021-02-09 PROCEDURE — 83721 ASSAY OF BLOOD LIPOPROTEIN: CPT | Performed by: NURSE PRACTITIONER

## 2021-02-22 PROCEDURE — U0003 INFECTIOUS AGENT DETECTION BY NUCLEIC ACID (DNA OR RNA); SEVERE ACUTE RESPIRATORY SYNDROME CORONAVIRUS 2 (SARS-COV-2) (CORONAVIRUS DISEASE [COVID-19]), AMPLIFIED PROBE TECHNIQUE, MAKING USE OF HIGH THROUGHPUT TECHNOLOGIES AS DESCRIBED BY CMS-2020-01-R: HCPCS | Performed by: INTERNAL MEDICINE

## 2021-02-22 PROCEDURE — U0005 INFEC AGEN DETEC AMPLI PROBE: HCPCS | Performed by: INTERNAL MEDICINE

## 2021-02-24 LAB — SARS-COV-2 N GENE RESP QL NAA+PROBE: NEGATIVE

## 2021-03-01 PROCEDURE — 87635 SARS-COV-2 COVID-19 AMP PRB: CPT | Performed by: NURSE PRACTITIONER

## 2021-03-02 LAB — SARS-COV-2 RNA RESP QL NAA+PROBE: NEGATIVE

## 2021-03-03 PROCEDURE — XW023T6 INTRODUCTION OF COVID-19 VACCINE DOSE 2 INTO MUSCLE, PERCUTANEOUS APPROACH, NEW TECHNOLOGY GROUP 6: ICD-10-PCS | Performed by: INTERNAL MEDICINE

## 2021-03-08 LAB — SARS-COV-2 RNA RESP QL NAA+PROBE: NEGATIVE

## 2021-03-08 PROCEDURE — U0005 INFEC AGEN DETEC AMPLI PROBE: HCPCS | Performed by: INTERNAL MEDICINE

## 2021-03-08 PROCEDURE — U0003 INFECTIOUS AGENT DETECTION BY NUCLEIC ACID (DNA OR RNA); SEVERE ACUTE RESPIRATORY SYNDROME CORONAVIRUS 2 (SARS-COV-2) (CORONAVIRUS DISEASE [COVID-19]), AMPLIFIED PROBE TECHNIQUE, MAKING USE OF HIGH THROUGHPUT TECHNOLOGIES AS DESCRIBED BY CMS-2020-01-R: HCPCS | Performed by: INTERNAL MEDICINE

## 2021-03-15 LAB — SARS-COV-2 RNA RESP QL NAA+PROBE: NEGATIVE

## 2021-03-15 PROCEDURE — U0005 INFEC AGEN DETEC AMPLI PROBE: HCPCS | Performed by: INTERNAL MEDICINE

## 2021-03-15 PROCEDURE — U0003 INFECTIOUS AGENT DETECTION BY NUCLEIC ACID (DNA OR RNA); SEVERE ACUTE RESPIRATORY SYNDROME CORONAVIRUS 2 (SARS-COV-2) (CORONAVIRUS DISEASE [COVID-19]), AMPLIFIED PROBE TECHNIQUE, MAKING USE OF HIGH THROUGHPUT TECHNOLOGIES AS DESCRIBED BY CMS-2020-01-R: HCPCS | Performed by: INTERNAL MEDICINE

## 2021-03-21 PROCEDURE — U0005 INFEC AGEN DETEC AMPLI PROBE: HCPCS | Performed by: INTERNAL MEDICINE

## 2021-03-21 PROCEDURE — U0003 INFECTIOUS AGENT DETECTION BY NUCLEIC ACID (DNA OR RNA); SEVERE ACUTE RESPIRATORY SYNDROME CORONAVIRUS 2 (SARS-COV-2) (CORONAVIRUS DISEASE [COVID-19]), AMPLIFIED PROBE TECHNIQUE, MAKING USE OF HIGH THROUGHPUT TECHNOLOGIES AS DESCRIBED BY CMS-2020-01-R: HCPCS | Performed by: INTERNAL MEDICINE

## 2021-03-22 LAB — SARS-COV-2 RNA RESP QL NAA+PROBE: NEGATIVE

## 2021-03-28 PROCEDURE — 87635 SARS-COV-2 COVID-19 AMP PRB: CPT | Performed by: INTERNAL MEDICINE

## 2021-03-29 LAB — SARS-COV-2 RNA RESP QL NAA+PROBE: NEGATIVE

## 2021-04-04 LAB — SARS-COV-2 RNA RESP QL NAA+PROBE: NEGATIVE

## 2021-04-04 PROCEDURE — 87635 SARS-COV-2 COVID-19 AMP PRB: CPT | Performed by: INTERNAL MEDICINE

## 2021-06-01 ENCOUNTER — HOSPITAL ENCOUNTER (INPATIENT)
Facility: HOSPITAL | Age: 86
DRG: 884 | End: 2021-06-01
Attending: INTERNAL MEDICINE | Admitting: INTERNAL MEDICINE
Payer: MEDICARE

## 2021-08-04 ENCOUNTER — LAB REQUISITION (OUTPATIENT)
Dept: LAB | Facility: HOSPITAL | Age: 86
End: 2021-08-04
Payer: MEDICARE

## 2021-08-04 DIAGNOSIS — Z11.59 ENCOUNTER FOR SCREENING FOR OTHER VIRAL DISEASES: ICD-10-CM

## 2021-08-04 PROCEDURE — U0005 INFEC AGEN DETEC AMPLI PROBE: HCPCS | Performed by: INTERNAL MEDICINE

## 2021-08-04 PROCEDURE — U0003 INFECTIOUS AGENT DETECTION BY NUCLEIC ACID (DNA OR RNA); SEVERE ACUTE RESPIRATORY SYNDROME CORONAVIRUS 2 (SARS-COV-2) (CORONAVIRUS DISEASE [COVID-19]), AMPLIFIED PROBE TECHNIQUE, MAKING USE OF HIGH THROUGHPUT TECHNOLOGIES AS DESCRIBED BY CMS-2020-01-R: HCPCS | Performed by: INTERNAL MEDICINE

## 2021-08-05 LAB — SARS-COV-2 RNA RESP QL NAA+PROBE: NEGATIVE

## 2021-08-11 ENCOUNTER — LAB REQUISITION (OUTPATIENT)
Dept: LAB | Facility: HOSPITAL | Age: 86
End: 2021-08-11
Payer: MEDICARE

## 2021-08-11 DIAGNOSIS — Z11.52 ENCOUNTER FOR SCREENING FOR COVID-19: ICD-10-CM

## 2021-08-11 LAB — SARS-COV-2 RNA RESP QL NAA+PROBE: NEGATIVE

## 2021-08-11 PROCEDURE — U0003 INFECTIOUS AGENT DETECTION BY NUCLEIC ACID (DNA OR RNA); SEVERE ACUTE RESPIRATORY SYNDROME CORONAVIRUS 2 (SARS-COV-2) (CORONAVIRUS DISEASE [COVID-19]), AMPLIFIED PROBE TECHNIQUE, MAKING USE OF HIGH THROUGHPUT TECHNOLOGIES AS DESCRIBED BY CMS-2020-01-R: HCPCS | Performed by: INTERNAL MEDICINE

## 2021-08-11 PROCEDURE — U0005 INFEC AGEN DETEC AMPLI PROBE: HCPCS | Performed by: INTERNAL MEDICINE

## 2021-08-17 ENCOUNTER — LAB REQUISITION (OUTPATIENT)
Dept: LAB | Facility: HOSPITAL | Age: 86
End: 2021-08-17
Payer: MEDICARE

## 2021-08-17 DIAGNOSIS — Z11.52 ENCOUNTER FOR SCREENING FOR COVID-19: ICD-10-CM

## 2021-08-17 LAB — SARS-COV-2 RNA RESP QL NAA+PROBE: NEGATIVE

## 2021-08-17 PROCEDURE — U0005 INFEC AGEN DETEC AMPLI PROBE: HCPCS | Performed by: INTERNAL MEDICINE

## 2021-08-17 PROCEDURE — U0003 INFECTIOUS AGENT DETECTION BY NUCLEIC ACID (DNA OR RNA); SEVERE ACUTE RESPIRATORY SYNDROME CORONAVIRUS 2 (SARS-COV-2) (CORONAVIRUS DISEASE [COVID-19]), AMPLIFIED PROBE TECHNIQUE, MAKING USE OF HIGH THROUGHPUT TECHNOLOGIES AS DESCRIBED BY CMS-2020-01-R: HCPCS | Performed by: INTERNAL MEDICINE

## 2021-09-07 ENCOUNTER — LAB REQUISITION (OUTPATIENT)
Dept: LAB | Facility: HOSPITAL | Age: 86
End: 2021-09-07
Payer: MEDICARE

## 2021-09-07 DIAGNOSIS — Z11.52 ENCOUNTER FOR SCREENING FOR COVID-19: ICD-10-CM

## 2021-09-07 PROCEDURE — U0003 INFECTIOUS AGENT DETECTION BY NUCLEIC ACID (DNA OR RNA); SEVERE ACUTE RESPIRATORY SYNDROME CORONAVIRUS 2 (SARS-COV-2) (CORONAVIRUS DISEASE [COVID-19]), AMPLIFIED PROBE TECHNIQUE, MAKING USE OF HIGH THROUGHPUT TECHNOLOGIES AS DESCRIBED BY CMS-2020-01-R: HCPCS | Performed by: INTERNAL MEDICINE

## 2021-09-07 PROCEDURE — U0005 INFEC AGEN DETEC AMPLI PROBE: HCPCS | Performed by: INTERNAL MEDICINE

## 2021-09-08 LAB — SARS-COV-2 RNA RESP QL NAA+PROBE: NEGATIVE

## 2021-09-14 ENCOUNTER — LAB REQUISITION (OUTPATIENT)
Dept: LAB | Facility: HOSPITAL | Age: 86
End: 2021-09-14
Payer: MEDICARE

## 2021-09-14 DIAGNOSIS — Z11.52 ENCOUNTER FOR SCREENING FOR COVID-19: ICD-10-CM

## 2021-09-14 LAB — SARS-COV-2 RNA RESP QL NAA+PROBE: NEGATIVE

## 2021-09-14 PROCEDURE — U0003 INFECTIOUS AGENT DETECTION BY NUCLEIC ACID (DNA OR RNA); SEVERE ACUTE RESPIRATORY SYNDROME CORONAVIRUS 2 (SARS-COV-2) (CORONAVIRUS DISEASE [COVID-19]), AMPLIFIED PROBE TECHNIQUE, MAKING USE OF HIGH THROUGHPUT TECHNOLOGIES AS DESCRIBED BY CMS-2020-01-R: HCPCS | Performed by: INTERNAL MEDICINE

## 2021-09-14 PROCEDURE — U0005 INFEC AGEN DETEC AMPLI PROBE: HCPCS | Performed by: INTERNAL MEDICINE

## 2021-09-20 ENCOUNTER — LAB REQUISITION (OUTPATIENT)
Dept: LAB | Facility: HOSPITAL | Age: 86
End: 2021-09-20
Payer: MEDICARE

## 2021-09-20 DIAGNOSIS — Z11.52 ENCOUNTER FOR SCREENING FOR COVID-19: ICD-10-CM

## 2021-09-20 LAB — SARS-COV-2 RNA RESP QL NAA+PROBE: NEGATIVE

## 2021-09-20 PROCEDURE — U0003 INFECTIOUS AGENT DETECTION BY NUCLEIC ACID (DNA OR RNA); SEVERE ACUTE RESPIRATORY SYNDROME CORONAVIRUS 2 (SARS-COV-2) (CORONAVIRUS DISEASE [COVID-19]), AMPLIFIED PROBE TECHNIQUE, MAKING USE OF HIGH THROUGHPUT TECHNOLOGIES AS DESCRIBED BY CMS-2020-01-R: HCPCS | Performed by: INTERNAL MEDICINE

## 2021-09-20 PROCEDURE — U0005 INFEC AGEN DETEC AMPLI PROBE: HCPCS | Performed by: INTERNAL MEDICINE

## 2021-09-27 ENCOUNTER — LAB REQUISITION (OUTPATIENT)
Dept: LAB | Facility: HOSPITAL | Age: 86
End: 2021-09-27
Payer: MEDICARE

## 2021-09-27 DIAGNOSIS — Z11.52 ENCOUNTER FOR SCREENING FOR COVID-19: ICD-10-CM

## 2021-09-27 PROCEDURE — U0005 INFEC AGEN DETEC AMPLI PROBE: HCPCS | Performed by: INTERNAL MEDICINE

## 2021-09-27 PROCEDURE — U0003 INFECTIOUS AGENT DETECTION BY NUCLEIC ACID (DNA OR RNA); SEVERE ACUTE RESPIRATORY SYNDROME CORONAVIRUS 2 (SARS-COV-2) (CORONAVIRUS DISEASE [COVID-19]), AMPLIFIED PROBE TECHNIQUE, MAKING USE OF HIGH THROUGHPUT TECHNOLOGIES AS DESCRIBED BY CMS-2020-01-R: HCPCS | Performed by: INTERNAL MEDICINE

## 2021-09-28 LAB — SARS-COV-2 RNA RESP QL NAA+PROBE: NEGATIVE

## 2021-10-04 ENCOUNTER — LAB REQUISITION (OUTPATIENT)
Dept: LAB | Facility: HOSPITAL | Age: 86
End: 2021-10-04
Payer: MEDICARE

## 2021-10-04 DIAGNOSIS — Z11.52 ENCOUNTER FOR SCREENING FOR COVID-19: ICD-10-CM

## 2021-10-04 LAB — SARS-COV-2 RNA RESP QL NAA+PROBE: NEGATIVE

## 2021-10-04 PROCEDURE — U0005 INFEC AGEN DETEC AMPLI PROBE: HCPCS | Performed by: INTERNAL MEDICINE

## 2021-10-04 PROCEDURE — U0003 INFECTIOUS AGENT DETECTION BY NUCLEIC ACID (DNA OR RNA); SEVERE ACUTE RESPIRATORY SYNDROME CORONAVIRUS 2 (SARS-COV-2) (CORONAVIRUS DISEASE [COVID-19]), AMPLIFIED PROBE TECHNIQUE, MAKING USE OF HIGH THROUGHPUT TECHNOLOGIES AS DESCRIBED BY CMS-2020-01-R: HCPCS | Performed by: INTERNAL MEDICINE

## 2021-10-11 ENCOUNTER — LAB REQUISITION (OUTPATIENT)
Dept: LAB | Facility: HOSPITAL | Age: 86
End: 2021-10-11
Payer: MEDICARE

## 2021-10-11 DIAGNOSIS — Z11.52 ENCOUNTER FOR SCREENING FOR COVID-19: ICD-10-CM

## 2021-10-11 LAB — SARS-COV-2 RNA RESP QL NAA+PROBE: NEGATIVE

## 2021-10-11 PROCEDURE — U0003 INFECTIOUS AGENT DETECTION BY NUCLEIC ACID (DNA OR RNA); SEVERE ACUTE RESPIRATORY SYNDROME CORONAVIRUS 2 (SARS-COV-2) (CORONAVIRUS DISEASE [COVID-19]), AMPLIFIED PROBE TECHNIQUE, MAKING USE OF HIGH THROUGHPUT TECHNOLOGIES AS DESCRIBED BY CMS-2020-01-R: HCPCS | Performed by: INTERNAL MEDICINE

## 2021-10-11 PROCEDURE — U0005 INFEC AGEN DETEC AMPLI PROBE: HCPCS | Performed by: INTERNAL MEDICINE

## 2021-11-22 ENCOUNTER — IMMUNIZATIONS (OUTPATIENT)
Dept: FAMILY MEDICINE CLINIC | Facility: HOSPITAL | Age: 86
End: 2021-11-22

## 2021-11-22 DIAGNOSIS — Z23 ENCOUNTER FOR IMMUNIZATION: Primary | ICD-10-CM

## 2021-11-22 PROCEDURE — 91301 COVID-19 MODERNA VACC 0.5 ML: CPT

## 2021-11-22 PROCEDURE — 0011A COVID-19 MODERNA VACC 0.5 ML: CPT

## 2021-12-16 PROCEDURE — U0005 INFEC AGEN DETEC AMPLI PROBE: HCPCS | Performed by: INTERNAL MEDICINE

## 2021-12-16 PROCEDURE — U0003 INFECTIOUS AGENT DETECTION BY NUCLEIC ACID (DNA OR RNA); SEVERE ACUTE RESPIRATORY SYNDROME CORONAVIRUS 2 (SARS-COV-2) (CORONAVIRUS DISEASE [COVID-19]), AMPLIFIED PROBE TECHNIQUE, MAKING USE OF HIGH THROUGHPUT TECHNOLOGIES AS DESCRIBED BY CMS-2020-01-R: HCPCS | Performed by: INTERNAL MEDICINE

## 2021-12-17 ENCOUNTER — LAB REQUISITION (OUTPATIENT)
Dept: LAB | Facility: HOSPITAL | Age: 86
End: 2021-12-17
Payer: MEDICARE

## 2021-12-17 DIAGNOSIS — Z11.59 ENCOUNTER FOR SCREENING FOR OTHER VIRAL DISEASES: ICD-10-CM

## 2021-12-17 LAB — SARS-COV-2 RNA RESP QL NAA+PROBE: NEGATIVE

## 2021-12-22 ENCOUNTER — LAB REQUISITION (OUTPATIENT)
Dept: LAB | Facility: HOSPITAL | Age: 86
End: 2021-12-22
Payer: MEDICARE

## 2021-12-22 DIAGNOSIS — Z11.52 ENCOUNTER FOR SCREENING FOR COVID-19: ICD-10-CM

## 2021-12-22 LAB — SARS-COV-2 RNA RESP QL NAA+PROBE: NEGATIVE

## 2021-12-22 PROCEDURE — U0003 INFECTIOUS AGENT DETECTION BY NUCLEIC ACID (DNA OR RNA); SEVERE ACUTE RESPIRATORY SYNDROME CORONAVIRUS 2 (SARS-COV-2) (CORONAVIRUS DISEASE [COVID-19]), AMPLIFIED PROBE TECHNIQUE, MAKING USE OF HIGH THROUGHPUT TECHNOLOGIES AS DESCRIBED BY CMS-2020-01-R: HCPCS | Performed by: INTERNAL MEDICINE

## 2021-12-22 PROCEDURE — U0005 INFEC AGEN DETEC AMPLI PROBE: HCPCS | Performed by: INTERNAL MEDICINE

## 2021-12-30 PROCEDURE — U0003 INFECTIOUS AGENT DETECTION BY NUCLEIC ACID (DNA OR RNA); SEVERE ACUTE RESPIRATORY SYNDROME CORONAVIRUS 2 (SARS-COV-2) (CORONAVIRUS DISEASE [COVID-19]), AMPLIFIED PROBE TECHNIQUE, MAKING USE OF HIGH THROUGHPUT TECHNOLOGIES AS DESCRIBED BY CMS-2020-01-R: HCPCS | Performed by: INTERNAL MEDICINE

## 2021-12-31 ENCOUNTER — LAB REQUISITION (OUTPATIENT)
Dept: LAB | Facility: HOSPITAL | Age: 86
End: 2021-12-31
Payer: MEDICARE

## 2021-12-31 DIAGNOSIS — Z11.59 ENCOUNTER FOR SCREENING FOR OTHER VIRAL DISEASES: ICD-10-CM

## 2022-01-02 LAB — SARS-COV-2 RNA RESP QL NAA+PROBE: NEGATIVE

## 2022-01-05 ENCOUNTER — LAB REQUISITION (OUTPATIENT)
Dept: LAB | Facility: HOSPITAL | Age: 87
End: 2022-01-05
Payer: MEDICARE

## 2022-01-05 DIAGNOSIS — Z11.59 ENCOUNTER FOR SCREENING FOR OTHER VIRAL DISEASES: ICD-10-CM

## 2022-01-05 PROCEDURE — U0005 INFEC AGEN DETEC AMPLI PROBE: HCPCS | Performed by: INTERNAL MEDICINE

## 2022-01-05 PROCEDURE — U0003 INFECTIOUS AGENT DETECTION BY NUCLEIC ACID (DNA OR RNA); SEVERE ACUTE RESPIRATORY SYNDROME CORONAVIRUS 2 (SARS-COV-2) (CORONAVIRUS DISEASE [COVID-19]), AMPLIFIED PROBE TECHNIQUE, MAKING USE OF HIGH THROUGHPUT TECHNOLOGIES AS DESCRIBED BY CMS-2020-01-R: HCPCS | Performed by: INTERNAL MEDICINE

## 2022-01-06 LAB — SARS-COV-2 RNA RESP QL NAA+PROBE: NEGATIVE

## 2022-01-10 ENCOUNTER — LAB REQUISITION (OUTPATIENT)
Dept: LAB | Facility: HOSPITAL | Age: 87
End: 2022-01-10
Payer: MEDICARE

## 2022-01-10 DIAGNOSIS — Z11.59 ENCOUNTER FOR SCREENING FOR OTHER VIRAL DISEASES: ICD-10-CM

## 2022-01-10 PROCEDURE — U0005 INFEC AGEN DETEC AMPLI PROBE: HCPCS | Performed by: INTERNAL MEDICINE

## 2022-01-10 PROCEDURE — U0003 INFECTIOUS AGENT DETECTION BY NUCLEIC ACID (DNA OR RNA); SEVERE ACUTE RESPIRATORY SYNDROME CORONAVIRUS 2 (SARS-COV-2) (CORONAVIRUS DISEASE [COVID-19]), AMPLIFIED PROBE TECHNIQUE, MAKING USE OF HIGH THROUGHPUT TECHNOLOGIES AS DESCRIBED BY CMS-2020-01-R: HCPCS | Performed by: INTERNAL MEDICINE

## 2022-01-11 LAB — SARS-COV-2 RNA RESP QL NAA+PROBE: NEGATIVE

## 2022-01-17 ENCOUNTER — LAB REQUISITION (OUTPATIENT)
Dept: LAB | Facility: HOSPITAL | Age: 87
End: 2022-01-17
Payer: MEDICARE

## 2022-01-17 DIAGNOSIS — Z11.59 ENCOUNTER FOR SCREENING FOR OTHER VIRAL DISEASES: ICD-10-CM

## 2022-01-17 PROCEDURE — U0005 INFEC AGEN DETEC AMPLI PROBE: HCPCS | Performed by: INTERNAL MEDICINE

## 2022-01-17 PROCEDURE — U0003 INFECTIOUS AGENT DETECTION BY NUCLEIC ACID (DNA OR RNA); SEVERE ACUTE RESPIRATORY SYNDROME CORONAVIRUS 2 (SARS-COV-2) (CORONAVIRUS DISEASE [COVID-19]), AMPLIFIED PROBE TECHNIQUE, MAKING USE OF HIGH THROUGHPUT TECHNOLOGIES AS DESCRIBED BY CMS-2020-01-R: HCPCS | Performed by: INTERNAL MEDICINE

## 2022-01-18 LAB — SARS-COV-2 RNA RESP QL NAA+PROBE: NEGATIVE

## 2022-01-24 ENCOUNTER — LAB REQUISITION (OUTPATIENT)
Dept: LAB | Facility: HOSPITAL | Age: 87
End: 2022-01-24
Payer: MEDICARE

## 2022-01-24 DIAGNOSIS — Z11.59 ENCOUNTER FOR SCREENING FOR OTHER VIRAL DISEASES: ICD-10-CM

## 2022-01-24 LAB — SARS-COV-2 RNA RESP QL NAA+PROBE: NEGATIVE

## 2022-01-24 PROCEDURE — U0003 INFECTIOUS AGENT DETECTION BY NUCLEIC ACID (DNA OR RNA); SEVERE ACUTE RESPIRATORY SYNDROME CORONAVIRUS 2 (SARS-COV-2) (CORONAVIRUS DISEASE [COVID-19]), AMPLIFIED PROBE TECHNIQUE, MAKING USE OF HIGH THROUGHPUT TECHNOLOGIES AS DESCRIBED BY CMS-2020-01-R: HCPCS | Performed by: INTERNAL MEDICINE

## 2022-01-24 PROCEDURE — U0005 INFEC AGEN DETEC AMPLI PROBE: HCPCS | Performed by: INTERNAL MEDICINE

## 2022-01-31 ENCOUNTER — LAB REQUISITION (OUTPATIENT)
Dept: LAB | Facility: HOSPITAL | Age: 87
End: 2022-01-31
Payer: MEDICARE

## 2022-01-31 DIAGNOSIS — Z11.59 ENCOUNTER FOR SCREENING FOR OTHER VIRAL DISEASES: ICD-10-CM

## 2022-01-31 PROCEDURE — U0003 INFECTIOUS AGENT DETECTION BY NUCLEIC ACID (DNA OR RNA); SEVERE ACUTE RESPIRATORY SYNDROME CORONAVIRUS 2 (SARS-COV-2) (CORONAVIRUS DISEASE [COVID-19]), AMPLIFIED PROBE TECHNIQUE, MAKING USE OF HIGH THROUGHPUT TECHNOLOGIES AS DESCRIBED BY CMS-2020-01-R: HCPCS | Performed by: INTERNAL MEDICINE

## 2022-01-31 PROCEDURE — U0005 INFEC AGEN DETEC AMPLI PROBE: HCPCS | Performed by: INTERNAL MEDICINE

## 2022-02-01 LAB — SARS-COV-2 RNA RESP QL NAA+PROBE: NEGATIVE

## 2022-02-07 ENCOUNTER — LAB REQUISITION (OUTPATIENT)
Dept: LAB | Facility: HOSPITAL | Age: 87
End: 2022-02-07
Payer: MEDICARE

## 2022-02-07 DIAGNOSIS — Z11.59 ENCOUNTER FOR SCREENING FOR OTHER VIRAL DISEASES: ICD-10-CM

## 2022-02-07 PROCEDURE — U0003 INFECTIOUS AGENT DETECTION BY NUCLEIC ACID (DNA OR RNA); SEVERE ACUTE RESPIRATORY SYNDROME CORONAVIRUS 2 (SARS-COV-2) (CORONAVIRUS DISEASE [COVID-19]), AMPLIFIED PROBE TECHNIQUE, MAKING USE OF HIGH THROUGHPUT TECHNOLOGIES AS DESCRIBED BY CMS-2020-01-R: HCPCS | Performed by: INTERNAL MEDICINE

## 2022-02-07 PROCEDURE — U0005 INFEC AGEN DETEC AMPLI PROBE: HCPCS | Performed by: INTERNAL MEDICINE

## 2022-02-08 LAB — SARS-COV-2 RNA RESP QL NAA+PROBE: NEGATIVE

## 2022-02-14 ENCOUNTER — LAB REQUISITION (OUTPATIENT)
Dept: LAB | Facility: HOSPITAL | Age: 87
End: 2022-02-14
Payer: MEDICARE

## 2022-02-14 DIAGNOSIS — Z11.59 ENCOUNTER FOR SCREENING FOR OTHER VIRAL DISEASES: ICD-10-CM

## 2022-02-14 LAB — SARS-COV-2 RNA RESP QL NAA+PROBE: POSITIVE

## 2022-02-14 PROCEDURE — U0003 INFECTIOUS AGENT DETECTION BY NUCLEIC ACID (DNA OR RNA); SEVERE ACUTE RESPIRATORY SYNDROME CORONAVIRUS 2 (SARS-COV-2) (CORONAVIRUS DISEASE [COVID-19]), AMPLIFIED PROBE TECHNIQUE, MAKING USE OF HIGH THROUGHPUT TECHNOLOGIES AS DESCRIBED BY CMS-2020-01-R: HCPCS | Performed by: INTERNAL MEDICINE

## 2022-02-14 PROCEDURE — U0005 INFEC AGEN DETEC AMPLI PROBE: HCPCS | Performed by: INTERNAL MEDICINE

## 2022-02-15 ENCOUNTER — LAB REQUISITION (OUTPATIENT)
Dept: LAB | Facility: HOSPITAL | Age: 87
End: 2022-02-15
Payer: MEDICARE

## 2022-02-15 DIAGNOSIS — E78.5 HYPERLIPIDEMIA, UNSPECIFIED: ICD-10-CM

## 2022-02-15 DIAGNOSIS — I87.2 VENOUS INSUFFICIENCY (CHRONIC) (PERIPHERAL): ICD-10-CM

## 2022-02-15 LAB
ALBUMIN SERPL BCP-MCNC: 3.3 G/DL (ref 3.5–5)
ALP SERPL-CCNC: 139 U/L (ref 46–116)
ALT SERPL W P-5'-P-CCNC: 16 U/L (ref 12–78)
ANION GAP SERPL CALCULATED.3IONS-SCNC: 4 MMOL/L (ref 4–13)
AST SERPL W P-5'-P-CCNC: 15 U/L (ref 5–45)
BILIRUB DIRECT SERPL-MCNC: 0.08 MG/DL (ref 0–0.2)
BILIRUB SERPL-MCNC: 0.38 MG/DL (ref 0.2–1)
BUN SERPL-MCNC: 21 MG/DL (ref 5–25)
CALCIUM SERPL-MCNC: 9.7 MG/DL (ref 8.3–10.1)
CHLORIDE SERPL-SCNC: 106 MMOL/L (ref 100–108)
CHOLEST SERPL-MCNC: 167 MG/DL
CO2 SERPL-SCNC: 31 MMOL/L (ref 21–32)
CREAT SERPL-MCNC: 0.7 MG/DL (ref 0.6–1.3)
ERYTHROCYTE [DISTWIDTH] IN BLOOD BY AUTOMATED COUNT: 13.4 % (ref 11.6–15.1)
GFR SERPL CREATININE-BSD FRML MDRD: 72 ML/MIN/1.73SQ M
GLUCOSE SERPL-MCNC: 82 MG/DL (ref 65–140)
HCT VFR BLD AUTO: 50.4 % (ref 34.8–46.1)
HDLC SERPL-MCNC: 28 MG/DL
HGB BLD-MCNC: 15.6 G/DL (ref 11.5–15.4)
LDLC SERPL CALC-MCNC: 102 MG/DL (ref 0–100)
MCH RBC QN AUTO: 29.6 PG (ref 26.8–34.3)
MCHC RBC AUTO-ENTMCNC: 31 G/DL (ref 31.4–37.4)
MCV RBC AUTO: 96 FL (ref 82–98)
NONHDLC SERPL-MCNC: 139 MG/DL
PLATELET # BLD AUTO: 273 THOUSANDS/UL (ref 149–390)
PMV BLD AUTO: 10.3 FL (ref 8.9–12.7)
POTASSIUM SERPL-SCNC: 4.1 MMOL/L (ref 3.5–5.3)
PROT SERPL-MCNC: 6.8 G/DL (ref 6.4–8.2)
RBC # BLD AUTO: 5.27 MILLION/UL (ref 3.81–5.12)
SODIUM SERPL-SCNC: 141 MMOL/L (ref 136–145)
TRIGL SERPL-MCNC: 183 MG/DL
WBC # BLD AUTO: 7.3 THOUSAND/UL (ref 4.31–10.16)

## 2022-02-15 PROCEDURE — 80048 BASIC METABOLIC PNL TOTAL CA: CPT | Performed by: INTERNAL MEDICINE

## 2022-02-15 PROCEDURE — 80061 LIPID PANEL: CPT | Performed by: INTERNAL MEDICINE

## 2022-02-15 PROCEDURE — 80076 HEPATIC FUNCTION PANEL: CPT | Performed by: INTERNAL MEDICINE

## 2022-02-15 PROCEDURE — 85027 COMPLETE CBC AUTOMATED: CPT | Performed by: INTERNAL MEDICINE

## 2022-06-01 ENCOUNTER — HOSPITAL ENCOUNTER (OUTPATIENT)
Dept: NON INVASIVE DIAGNOSTICS | Facility: HOSPITAL | Age: 87
Discharge: HOME | End: 2022-06-01
Attending: INTERNAL MEDICINE
Payer: MEDICARE

## 2022-06-01 DIAGNOSIS — I87.2 VENOUS INSUFFICIENCY (CHRONIC) (PERIPHERAL): ICD-10-CM

## 2022-06-01 PROCEDURE — 93925 LOWER EXTREMITY STUDY: CPT

## 2022-06-01 PROCEDURE — 93923 UPR/LXTR ART STDY 3+ LVLS: CPT

## 2022-06-02 PROCEDURE — 93925 LOWER EXTREMITY STUDY: CPT | Performed by: SURGERY

## 2022-06-02 PROCEDURE — 93922 UPR/L XTREMITY ART 2 LEVELS: CPT | Performed by: SURGERY

## 2022-08-09 NOTE — PLAN OF CARE
Problem: Potential for Falls  Goal: Patient will remain free of falls  Description  INTERVENTIONS:  - Assess patient frequently for physical needs  -  Identify cognitive and physical deficits and behaviors that affect risk of falls    -  [place] fall precautions as indicated by assessment   - Educate patient/family on patient safety including physical limitations  - Instruct patient to call for assistance with activity based on assessment  - Modify environment to reduce risk of injury  - Consider OT/PT consult to assist with strengthening/mobility  Outcome: Progressing     Problem: Prexisting or High Potential for Compromised Skin Integrity  Goal: Skin integrity is maintained or improved  Description  INTERVENTIONS:  - Identify patients at risk for skin breakdown  - Assess and monitor skin integrity  - Assess and monitor nutrition and hydration status  - Monitor labs   - Assess for incontinence   - Turn and reposition patient  - Assist with mobility/ambulation  - Relieve pressure over bony prominences  - Avoid friction and shearing  - Provide appropriate hygiene as needed including keeping skin clean and dry  - Evaluate need for skin moisturizer/barrier cream  - Collaborate with interdisciplinary team   - Patient/family teaching  - Consider wound care consult   Outcome: Progressing     Problem: PAIN - ADULT  Goal: Verbalizes/displays adequate comfort level or baseline comfort level  Description  Interventions:  - Encourage patient to monitor pain and request assistance  - Assess pain using appropriate pain scale  - Administer analgesics based on type and severity of pain and evaluate response  - Implement non-pharmacological measures as appropriate and evaluate response  - Consider cultural and social influences on pain and pain management  - Notify physician/advanced practitioner if interventions unsuccessful or patient reports new pain  Outcome: Progressing     Problem: INFECTION - ADULT  Goal: Absence or prevention of progression during hospitalization  Description  INTERVENTIONS:  - Assess and monitor for signs and symptoms of infection  - Monitor lab/diagnostic results  - Monitor all insertion sites, i e  indwelling lines, tubes, and drains  - [place] appropriate cooling/warming therapies per order  - Administer medications as ordered  - Instruct and encourage patient and family to use good hand hygiene technique     Outcome: Progressing     Problem: SAFETY ADULT  Goal: Maintain or return to baseline ADL function  Description  INTERVENTIONS:  -  Assess patient's ability to carry out ADLs; assess patient's baseline for ADL function and identify physical deficits which impact ability to perform ADLs (bathing, care of mouth/teeth, toileting, grooming, dressing, etc )  - Assess/evaluate cause of self-care deficits   - Assess range of motion  - Assess patient's mobility; develop plan if impaired  - Assess patient's need for assistive devices and provide as appropriate  - Encourage maximum independence but intervene and supervise when necessary  - Involve family in performance of ADLs  - Assess for home care needs following discharge   - Consider OT consult to assist with ADL evaluation and planning for discharge  - Provide patient education as appropriate  Outcome: Progressing  Goal: Maintain or return mobility status to optimal level  Description  INTERVENTIONS:  - Assess patient's baseline mobility status (ambulation, transfers, stairs, etc )    - Identify cognitive and physical deficits and behaviors that affect mobility  - Identify mobility aids required to assist with transfers and/or ambulation (gait belt, sit-to-stand, lift, walker, cane, etc )  - [place] fall precautions as indicated by assessment  - Record patient progress and toleration of activity level on Mobility SBAR; progress patient to next Phase/Stage  - Instruct patient to call for assistance with activity based on assessment  - Consider rehabilitation consult to assist with strengthening/weightbearing, etc   Outcome: Progressing     Problem: DISCHARGE PLANNING  Goal: Discharge to home or other facility with appropriate resources  Description  INTERVENTIONS:  - Identify barriers to discharge w/patient and caregiver  - Arrange for needed discharge resources and transportation as appropriate  - Identify discharge learning needs (meds, wound care, etc )  - Refer to Case Management Department for coordinating discharge planning if the patient needs post-hospital services based on physician/advanced practitioner order or complex needs related to functional status, cognitive ability, or social support system   Outcome: Progressing     Problem: Knowledge Deficit  Goal: Patient/family/caregiver demonstrates understanding of disease process, treatment plan, medications, and discharge instructions  Description  Complete learning assessment and assess knowledge base    Interventions:  - Provide teaching at level of understanding  - Provide teaching via preferred learning methods  Outcome: Progressing     Problem: CARDIOVASCULAR - ADULT  Goal: Maintains optimal cardiac output and hemodynamic stability  Description  INTERVENTIONS:  - Monitor I/O, vital signs and rhythm  - Monitor for S/S and trends of decreased cardiac output  - Assess quality of pulses, skin color and temperature  - Assess for signs of decreased coronary artery perfusion  - Instruct patient to report change in severity of symptoms   Outcome: Progressing  Goal: Absence of cardiac dysrhythmias or at baseline rhythm  Description  INTERVENTIONS:  - Continuous cardiac monitoring, vital signs, obtain 12 lead EKG if ordered  - Administer antiarrhythmic and heart rate control medications as ordered  - Monitor electrolytes and administer replacement therapy as ordered  Outcome: Progressing     Problem: GENITOURINARY - ADULT  Goal: Absence of urinary retention  Description  INTERVENTIONS:  - Assess patient's ability to void and empty bladder  - Monitor I/O  - Bladder scan as needed  - Discuss with physician/AP medications to alleviate retention as needed  - Discuss catheterization for long term situations as appropriate   Outcome: Progressing     Problem: METABOLIC, FLUID AND ELECTROLYTES - ADULT  Goal: Electrolytes maintained within normal limits  Description  INTERVENTIONS:  - Monitor labs and assess patient for signs and symptoms of electrolyte imbalances  - Administer electrolyte replacement as ordered  - Monitor response to electrolyte replacements, including repeat lab results as appropriate  - Instruct patient on fluid and nutrition as appropriate  Outcome: Progressing  Goal: Fluid balance maintained  Description  INTERVENTIONS:  - Monitor labs   - Monitor I/O and WT  - Instruct patient on fluid and nutrition as appropriate  - Assess for signs & symptoms of volume excess or deficit  Outcome: Progressing     Problem: SKIN/TISSUE INTEGRITY - ADULT  Goal: Skin integrity remains intact  Description  INTERVENTIONS  - Identify patients at risk for skin breakdown  - Assess and monitor skin integrity  - Assess and monitor nutrition and hydration status  - Monitor labs (i e  albumin)  - Assess for incontinence   - Turn and reposition patient  - Assist with mobility/ambulation  - Relieve pressure over bony prominences  - Avoid friction and shearing  - Provide appropriate hygiene as needed including keeping skin clean and dry  - Evaluate need for skin moisturizer/barrier cream  - Collaborate with interdisciplinary team (i e  Nutrition, Rehabilitation, etc )   - Patient/family teaching  Outcome: Progressing  Goal: Incision(s), wounds(s) or drain site(s) healing without S/S of infection  Description  INTERVENTIONS  - Assess and document risk factors for skin impairment   - Assess and document dressing, incision, wound bed, drain sites and surrounding tissue  - Consider nutrition services referral as needed  - Oral mucous membranes remain intact  - Provide patient/ family education  Outcome: Progressing     Problem: MUSCULOSKELETAL - ADULT  Goal: Maintain or return mobility to safest level of function  Description  INTERVENTIONS:  - Assess patient's ability to carry out ADLs; assess patient's baseline for ADL function and identify physical deficits which impact ability to perform ADLs (bathing, care of mouth/teeth, toileting, grooming, dressing, etc )  - Assess/evaluate cause of self-care deficits   - Assess range of motion  - Assess patient's mobility  - Assess patient's need for assistive devices and provide as appropriate  - Encourage maximum independence but intervene and supervise when necessary  - Involve family in performance of ADLs  - Assess for home care needs following discharge   - Consider OT consult to assist with ADL evaluation and planning for discharge  - Provide patient education as appropriate  Outcome: Progressing 160

## 2022-09-01 PROCEDURE — 82270 OCCULT BLOOD FECES: CPT | Performed by: INTERNAL MEDICINE

## 2022-09-04 ENCOUNTER — LAB REQUISITION (OUTPATIENT)
Dept: LAB | Facility: HOSPITAL | Age: 87
End: 2022-09-04
Payer: MEDICARE

## 2022-09-04 DIAGNOSIS — N77.1 VAGINITIS, VULVITIS AND VULVOVAGINITIS IN DISEASES CLASSIFIED ELSEWHERE: ICD-10-CM

## 2022-09-04 LAB
DATE SPECIMEN #1: NORMAL
DATE SPECIMEN #2: NORMAL
DATE SPECIMEN #3: NORMAL
HEMOCCULT SP1 STL QL: NEGATIVE
HEMOCCULT SP2 STL QL: NORMAL
HEMOCCULT SP3 STL QL: NORMAL

## 2022-09-06 ENCOUNTER — LAB REQUISITION (OUTPATIENT)
Dept: LAB | Facility: HOSPITAL | Age: 87
End: 2022-09-06
Payer: MEDICARE

## 2022-09-06 DIAGNOSIS — E78.5 HYPERLIPIDEMIA, UNSPECIFIED: ICD-10-CM

## 2022-09-06 DIAGNOSIS — N77.1 VAGINITIS, VULVITIS AND VULVOVAGINITIS IN DISEASES CLASSIFIED ELSEWHERE: ICD-10-CM

## 2022-09-06 LAB
ANION GAP SERPL CALCULATED.3IONS-SCNC: 7 MMOL/L (ref 4–13)
AST SERPL W P-5'-P-CCNC: 9 U/L (ref 13–39)
BACTERIA UR QL AUTO: ABNORMAL /HPF
BILIRUB UR QL STRIP: NEGATIVE
BUN SERPL-MCNC: 20 MG/DL (ref 5–25)
CALCIUM SERPL-MCNC: 9.7 MG/DL (ref 8.4–10.2)
CHLORIDE SERPL-SCNC: 106 MMOL/L (ref 96–108)
CHOLEST SERPL-MCNC: 132 MG/DL
CLARITY UR: CLEAR
CO2 SERPL-SCNC: 30 MMOL/L (ref 21–32)
COLOR UR: YELLOW
CREAT SERPL-MCNC: 0.53 MG/DL (ref 0.6–1.3)
ERYTHROCYTE [DISTWIDTH] IN BLOOD BY AUTOMATED COUNT: 13.6 % (ref 11.6–15.1)
GFR SERPL CREATININE-BSD FRML MDRD: 78 ML/MIN/1.73SQ M
GLUCOSE SERPL-MCNC: 86 MG/DL (ref 65–140)
GLUCOSE UR STRIP-MCNC: NEGATIVE MG/DL
HCT VFR BLD AUTO: 43.5 % (ref 34.8–46.1)
HDLC SERPL-MCNC: 29 MG/DL
HEMOCCULT STL QL: NEGATIVE
HEMOCCULT STL QL: NORMAL
HEMOCCULT STL QL: NORMAL
HGB BLD-MCNC: 14.3 G/DL (ref 11.5–15.4)
HGB UR QL STRIP.AUTO: ABNORMAL
KETONES UR STRIP-MCNC: NEGATIVE MG/DL
LDLC SERPL CALC-MCNC: 73 MG/DL (ref 0–100)
LEUKOCYTE ESTERASE UR QL STRIP: ABNORMAL
MCH RBC QN AUTO: 30.8 PG (ref 26.8–34.3)
MCHC RBC AUTO-ENTMCNC: 32.9 G/DL (ref 31.4–37.4)
MCV RBC AUTO: 94 FL (ref 82–98)
NITRITE UR QL STRIP: NEGATIVE
NON-SQ EPI CELLS URNS QL MICRO: ABNORMAL /HPF
NONHDLC SERPL-MCNC: 103 MG/DL
PH UR STRIP.AUTO: 7 [PH]
PLATELET # BLD AUTO: 234 THOUSANDS/UL (ref 149–390)
PMV BLD AUTO: 10.6 FL (ref 8.9–12.7)
POTASSIUM SERPL-SCNC: 4.1 MMOL/L (ref 3.5–5.3)
PROT UR STRIP-MCNC: NEGATIVE MG/DL
RBC # BLD AUTO: 4.65 MILLION/UL (ref 3.81–5.12)
RBC #/AREA URNS AUTO: ABNORMAL /HPF
SODIUM SERPL-SCNC: 143 MMOL/L (ref 135–147)
SP GR UR STRIP.AUTO: 1.02 (ref 1–1.03)
TRIGL SERPL-MCNC: 149 MG/DL
UROBILINOGEN UR QL STRIP.AUTO: 0.2 E.U./DL
WBC # BLD AUTO: 7.77 THOUSAND/UL (ref 4.31–10.16)
WBC #/AREA URNS AUTO: ABNORMAL /HPF

## 2022-09-06 PROCEDURE — 85027 COMPLETE CBC AUTOMATED: CPT | Performed by: INTERNAL MEDICINE

## 2022-09-06 PROCEDURE — 81001 URINALYSIS AUTO W/SCOPE: CPT | Performed by: INTERNAL MEDICINE

## 2022-09-06 PROCEDURE — 82272 OCCULT BLD FECES 1-3 TESTS: CPT | Performed by: INTERNAL MEDICINE

## 2022-09-06 PROCEDURE — 84450 TRANSFERASE (AST) (SGOT): CPT | Performed by: INTERNAL MEDICINE

## 2022-09-06 PROCEDURE — 80061 LIPID PANEL: CPT | Performed by: INTERNAL MEDICINE

## 2022-09-06 PROCEDURE — 80048 BASIC METABOLIC PNL TOTAL CA: CPT | Performed by: INTERNAL MEDICINE

## 2022-09-06 NOTE — PLAN OF CARE
ANTICOAGULATION MANAGEMENT     Matt Rodriguez 66 year old male is on warfarin with subtherapeutic INR result. (Goal INR 2.0-3.0)    Recent labs: (last 7 days)     09/06/22  0000   INR 1.8*       ASSESSMENT       Source(s): Chart Review and Patient/Caregiver Call       Warfarin doses taken: Per patient, he is not 100 % sure if he took his dose last Saturday     Diet: No new diet changes identified    New illness, injury, or hospitalization: No    Medication/supplement changes: None noted    Signs or symptoms of bleeding or clotting: No    Previous INR: Therapeutic last visit; previously outside of goal range    Additional findings: None       PLAN     Recommended plan for temporary change(s) affecting INR     Dosing Instructions: booster dose then continue your current warfarin dose with next INR in 1 week       Summary  As of 9/6/2022    Full warfarin instructions:  9/6: 7.5 mg; Otherwise 5 mg every day   Next INR check:  9/13/2022             Telephone call with Matt who verbalizes understanding and agrees to plan    Patient to recheck with home meter    Education provided: Goal range and significance of current result, Importance of therapeutic range, Importance of following up at instructed interval and Importance of taking warfarin as instructed    Plan made per [facility] anticoagulation protocol    Zaida Gamez RN  Anticoagulation Clinic  9/6/2022    _______________________________________________________________________     Anticoagulation Episode Summary     Current INR goal:  2.0-3.0   TTR:  82.7 % (10.6 mo)   Target end date:  Indefinite   Send INR reminders to:  [facility]    Indications    Thrombophilia (H) [D68.59]           Comments:  Selena home meter, MBE         Anticoagulation Care Providers     Provider Role Specialty Phone number    Dl Allen MD Referring Family Medicine [phone]           Problem: PHYSICAL THERAPY ADULT  Goal: Performs mobility at highest level of function for planned discharge setting  See evaluation for individualized goals  Description  Treatment/Interventions: Functional transfer training, LE strengthening/ROM, Therapeutic exercise, Endurance training, Bed mobility, Gait training  Equipment Recommended: Wheel, William Paget       See flowsheet documentation for full assessment, interventions and recommendations  Outcome: Progressing  Note:   Prognosis: Fair  Problem List: Decreased strength, Impaired balance, Decreased endurance, Decreased mobility, Impaired vision, Impaired hearing, Pain  Assessment: Pt is 80 y o  female seen for PT evaluation s/p admit to [facility] on 12/28/2019 w/ Stroke-like symptom  PT consulted to assess pt's functional mobility and d/c needs  Order placed for PT eval and tx, w/ up as tolerated order  Comorbidities affecting pt's physical performance at time of assessment include: UTI, cognitive impairment and blindness  PTA, pt was requiring A for mobility, resident of [facility] and retired  Personal factors affecting pt at time of IE include: decreased cognition, hearing impairments and visual impairments  Please find objective findings from PT assessment regarding body systems outlined above with impairments and limitations including weakness, impaired balance, decreased endurance, pain, decreased activity tolerance, decreased functional mobility tolerance, decreased safety awareness, fall risk and decreased cognition  The following objective measures performed on IE also reveal limitations: Barthel Index: 35/100  Pt's clinical presentation is currently unstable/unpredictable seen in pt's presentation of confusion and recent facial drooping with weakness  Pt to benefit from continued PT tx to address deficits as defined above and maximize level of functional independent mobility and consistency   From PT/mobility standpoint, recommendation at time of d/c would be STR pending progress in order to facilitate return to PLOF  Barriers to Discharge: Decreased caregiver support     Recommendation: Short-term skilled PT     PT - OK to Discharge: No    See flowsheet documentation for full assessment     eJan Escobar, PT

## 2022-09-08 ENCOUNTER — LAB REQUISITION (OUTPATIENT)
Dept: LAB | Facility: HOSPITAL | Age: 87
End: 2022-09-08
Payer: MEDICARE

## 2022-09-08 DIAGNOSIS — N77.1 VAGINITIS, VULVITIS AND VULVOVAGINITIS IN DISEASES CLASSIFIED ELSEWHERE: ICD-10-CM

## 2022-09-08 LAB
HEMOCCULT STL QL: NEGATIVE
HEMOCCULT STL QL: NORMAL
HEMOCCULT STL QL: NORMAL

## 2022-09-08 PROCEDURE — 82272 OCCULT BLD FECES 1-3 TESTS: CPT | Performed by: INTERNAL MEDICINE

## 2023-04-06 ENCOUNTER — LAB REQUISITION (OUTPATIENT)
Dept: LAB | Facility: HOSPITAL | Age: 88
End: 2023-04-06

## 2023-04-06 DIAGNOSIS — I10 ESSENTIAL (PRIMARY) HYPERTENSION: ICD-10-CM

## 2023-04-06 LAB
ANION GAP SERPL CALCULATED.3IONS-SCNC: 5 MMOL/L (ref 4–13)
AST SERPL W P-5'-P-CCNC: 10 U/L (ref 13–39)
BASOPHILS # BLD AUTO: 0.06 THOUSANDS/ÂΜL (ref 0–0.1)
BASOPHILS NFR BLD AUTO: 1 % (ref 0–1)
BUN SERPL-MCNC: 20 MG/DL (ref 5–25)
CALCIUM SERPL-MCNC: 9.6 MG/DL (ref 8.4–10.2)
CHLORIDE SERPL-SCNC: 105 MMOL/L (ref 96–108)
CO2 SERPL-SCNC: 33 MMOL/L (ref 21–32)
CREAT SERPL-MCNC: 0.66 MG/DL (ref 0.6–1.3)
EOSINOPHIL # BLD AUTO: 0.15 THOUSAND/ÂΜL (ref 0–0.61)
EOSINOPHIL NFR BLD AUTO: 2 % (ref 0–6)
ERYTHROCYTE [DISTWIDTH] IN BLOOD BY AUTOMATED COUNT: 13.5 % (ref 11.6–15.1)
GFR SERPL CREATININE-BSD FRML MDRD: 73 ML/MIN/1.73SQ M
GLUCOSE SERPL-MCNC: 84 MG/DL (ref 65–140)
HCT VFR BLD AUTO: 45.5 % (ref 34.8–46.1)
HGB BLD-MCNC: 14.2 G/DL (ref 11.5–15.4)
IMM GRANULOCYTES # BLD AUTO: 0.02 THOUSAND/UL (ref 0–0.2)
IMM GRANULOCYTES NFR BLD AUTO: 0 % (ref 0–2)
LYMPHOCYTES # BLD AUTO: 1.74 THOUSANDS/ÂΜL (ref 0.6–4.47)
LYMPHOCYTES NFR BLD AUTO: 24 % (ref 14–44)
MCH RBC QN AUTO: 30.4 PG (ref 26.8–34.3)
MCHC RBC AUTO-ENTMCNC: 31.2 G/DL (ref 31.4–37.4)
MCV RBC AUTO: 97 FL (ref 82–98)
MONOCYTES # BLD AUTO: 0.67 THOUSAND/ÂΜL (ref 0.17–1.22)
MONOCYTES NFR BLD AUTO: 9 % (ref 4–12)
NEUTROPHILS # BLD AUTO: 4.54 THOUSANDS/ÂΜL (ref 1.85–7.62)
NEUTS SEG NFR BLD AUTO: 64 % (ref 43–75)
NRBC BLD AUTO-RTO: 0 /100 WBCS
PLATELET # BLD AUTO: 205 THOUSANDS/UL (ref 149–390)
PMV BLD AUTO: 10.9 FL (ref 8.9–12.7)
POTASSIUM SERPL-SCNC: 3.9 MMOL/L (ref 3.5–5.3)
RBC # BLD AUTO: 4.67 MILLION/UL (ref 3.81–5.12)
SODIUM SERPL-SCNC: 143 MMOL/L (ref 135–147)
WBC # BLD AUTO: 7.18 THOUSAND/UL (ref 4.31–10.16)

## 2023-06-23 NOTE — ASSESSMENT & PLAN NOTE
Continue pre-hospital aspirin 81 mg p o   Daily Impression: Combined forms of age-related cataract, bilateral: H25.813. Plan:  Discussed cataract diagnosis with the patient. Discussed and reviewed treatment options for cataracts. Surgical treatment is required for cataracts. Risks and benefits of surgical treatment were discussed and understood. Patient elects surgical treatment. Recommend surgery OU, OS first. Patient is candidate/interested in multifocal, toric, standard, LenSx and ORA. Aim OD: plano. Aim OS: plano. Patient will need glasses for all near work, including computer. Patient uses soft contacts and is aware is supposed to remove contacts 48hrs prior to measurements.

## 2023-08-01 ENCOUNTER — LAB REQUISITION (OUTPATIENT)
Dept: LAB | Facility: HOSPITAL | Age: 88
End: 2023-08-01
Payer: MEDICARE

## 2023-08-01 DIAGNOSIS — E78.5 HYPERLIPIDEMIA, UNSPECIFIED: ICD-10-CM

## 2023-08-01 DIAGNOSIS — I10 ESSENTIAL (PRIMARY) HYPERTENSION: ICD-10-CM

## 2023-08-01 LAB
ANION GAP SERPL CALCULATED.3IONS-SCNC: 5 MMOL/L
AST SERPL W P-5'-P-CCNC: 12 U/L (ref 13–39)
BASOPHILS # BLD AUTO: 0.06 THOUSANDS/ÂΜL (ref 0–0.1)
BASOPHILS NFR BLD AUTO: 1 % (ref 0–1)
BUN SERPL-MCNC: 19 MG/DL (ref 5–25)
CALCIUM SERPL-MCNC: 9.5 MG/DL (ref 8.4–10.2)
CHLORIDE SERPL-SCNC: 104 MMOL/L (ref 96–108)
CHOLEST SERPL-MCNC: 178 MG/DL
CO2 SERPL-SCNC: 33 MMOL/L (ref 21–32)
CREAT SERPL-MCNC: 0.61 MG/DL (ref 0.6–1.3)
EOSINOPHIL # BLD AUTO: 0.25 THOUSAND/ÂΜL (ref 0–0.61)
EOSINOPHIL NFR BLD AUTO: 4 % (ref 0–6)
ERYTHROCYTE [DISTWIDTH] IN BLOOD BY AUTOMATED COUNT: 13.1 % (ref 11.6–15.1)
GFR SERPL CREATININE-BSD FRML MDRD: 74 ML/MIN/1.73SQ M
GLUCOSE SERPL-MCNC: 88 MG/DL (ref 65–140)
HCT VFR BLD AUTO: 49.9 % (ref 34.8–46.1)
HDLC SERPL-MCNC: 35 MG/DL
HGB BLD-MCNC: 15.5 G/DL (ref 11.5–15.4)
IMM GRANULOCYTES # BLD AUTO: 0.04 THOUSAND/UL (ref 0–0.2)
IMM GRANULOCYTES NFR BLD AUTO: 1 % (ref 0–2)
LDLC SERPL CALC-MCNC: 104 MG/DL (ref 0–100)
LYMPHOCYTES # BLD AUTO: 2.05 THOUSANDS/ÂΜL (ref 0.6–4.47)
LYMPHOCYTES NFR BLD AUTO: 31 % (ref 14–44)
MCH RBC QN AUTO: 29.9 PG (ref 26.8–34.3)
MCHC RBC AUTO-ENTMCNC: 31.1 G/DL (ref 31.4–37.4)
MCV RBC AUTO: 96 FL (ref 82–98)
MONOCYTES # BLD AUTO: 0.61 THOUSAND/ÂΜL (ref 0.17–1.22)
MONOCYTES NFR BLD AUTO: 9 % (ref 4–12)
NEUTROPHILS # BLD AUTO: 3.59 THOUSANDS/ÂΜL (ref 1.85–7.62)
NEUTS SEG NFR BLD AUTO: 54 % (ref 43–75)
NONHDLC SERPL-MCNC: 143 MG/DL
NRBC BLD AUTO-RTO: 0 /100 WBCS
PLATELET # BLD AUTO: 234 THOUSANDS/UL (ref 149–390)
PMV BLD AUTO: 10.7 FL (ref 8.9–12.7)
POTASSIUM SERPL-SCNC: 4.1 MMOL/L (ref 3.5–5.3)
RBC # BLD AUTO: 5.19 MILLION/UL (ref 3.81–5.12)
SODIUM SERPL-SCNC: 142 MMOL/L (ref 135–147)
TRIGL SERPL-MCNC: 196 MG/DL
WBC # BLD AUTO: 6.6 THOUSAND/UL (ref 4.31–10.16)

## 2023-08-01 PROCEDURE — 80061 LIPID PANEL: CPT | Performed by: INTERNAL MEDICINE

## 2023-08-01 PROCEDURE — 80048 BASIC METABOLIC PNL TOTAL CA: CPT | Performed by: INTERNAL MEDICINE

## 2023-08-01 PROCEDURE — 85025 COMPLETE CBC W/AUTO DIFF WBC: CPT | Performed by: INTERNAL MEDICINE

## 2023-08-01 PROCEDURE — 84450 TRANSFERASE (AST) (SGOT): CPT | Performed by: INTERNAL MEDICINE
